# Patient Record
Sex: MALE | Race: WHITE | NOT HISPANIC OR LATINO | Employment: FULL TIME | URBAN - METROPOLITAN AREA
[De-identification: names, ages, dates, MRNs, and addresses within clinical notes are randomized per-mention and may not be internally consistent; named-entity substitution may affect disease eponyms.]

---

## 2017-01-27 ENCOUNTER — OFFICE VISIT (OUTPATIENT)
Dept: FAMILY MEDICINE | Facility: CLINIC | Age: 42
End: 2017-01-27
Payer: COMMERCIAL

## 2017-01-27 VITALS
SYSTOLIC BLOOD PRESSURE: 109 MMHG | BODY MASS INDEX: 32.78 KG/M2 | WEIGHT: 242 LBS | HEIGHT: 72 IN | DIASTOLIC BLOOD PRESSURE: 70 MMHG | TEMPERATURE: 98.1 F | HEART RATE: 72 BPM | RESPIRATION RATE: 14 BRPM

## 2017-01-27 DIAGNOSIS — B07.0 PLANTAR WARTS: Primary | ICD-10-CM

## 2017-01-27 PROCEDURE — 17110 DESTRUCTION B9 LES UP TO 14: CPT | Performed by: PHYSICIAN ASSISTANT

## 2017-01-27 NOTE — PROGRESS NOTES
SUBJECTIVE: 41 year old male complains of warts.   They have been present for 5 year(s).    OBJECTIVE: 5 wart(s) noted on the bilateral feet. Size range is 1/4-3 cm.    ASSESSMENT: Warts (Verruca Vulgaris)    PLAN: The viral etiology and natural history has been discussed.   Various treatment methods, side effects and failure rates have been   discussed.  A choice of liquid nitrogen with Cantharidin was made, and the expected   blistering or scabbing reaction explained.  Liquid nitrogen was   applied to 5 wart(s) with cantharidin;  the patient to go to [facility] prn in 2-4 weeks for laser therapy    Rajan Gleason, PAC

## 2017-01-27 NOTE — NURSING NOTE
Chief Complaint   Patient presents with     Wart     Initial /70 mmHg  Pulse 72  Temp(Src) 98.1  F (36.7  C) (Oral)  Resp 14  Ht 6' (1.829 m)  Wt 242 lb (109.77 kg)  BMI 32.81 kg/m2 Estimated body mass index is 32.81 kg/(m^2) as calculated from the following:    Height as of this encounter: 6' (1.829 m).    Weight as of this encounter: 242 lb (109.77 kg)..  BP completed using cuff size large-RA

## 2017-03-11 DIAGNOSIS — E03.1 CONGENITAL HYPOTHYROIDISM WITHOUT GOITER: ICD-10-CM

## 2017-03-11 NOTE — TELEPHONE ENCOUNTER
Symthroid     Last Written Prescription Date: 03/01/2016  Last Quantity: 90, # refills: 3  Last Office Visit with G, P or [facility] prescribing provider: 01/27/2017-Rajan Gleason        TSH   Date Value Ref Range Status   03/11/2016 2.05 0.40 - 4.00 mU/L Final

## 2017-03-11 NOTE — LETTER
[facility]  [address] [place] [address]  Phone: [phone]    03/13/17    Kodak Crowe  [address] [address]      Dear Kodak:     We recently received a call from your pharmacy requesting a refill of your medication Synthroid.    A review of your chart indicates that an appointment is required with your provider for recheck labs. Please call the clinic at [phone] to schedule your lab appointment.    We have authorized one refill of your medication to allow time for you to schedule your appointment.    Taking care of your health is important to us, and ongoing visits with your provider are vital to your care.  We look forward to seeing you in the near future.          Sincerely,      Buddy Gleason PA-C/Amira MEDEL RN

## 2017-03-13 RX ORDER — LEVOTHYROXINE SODIUM 125 UG/1
125 TABLET ORAL DAILY
Qty: 90 TABLET | Refills: 0 | Status: SHIPPED | OUTPATIENT
Start: 2017-03-13 | End: 2017-06-19

## 2017-03-13 NOTE — TELEPHONE ENCOUNTER
levothyroxine (SYNTHROID, LEVOTHROID) 125 MCG tablet 90 tablet 3 3/12/2016  No   Sig: Take 1 tablet (125 mcg) by mouth daily   Class: E-Prescribe   Notes to Pharmacy: Patient needs brand name Synthroid.     Medication is being filled for 1 time refill only due to:  Future labs ordered TSH.     Amira Gomes RN, BS  Clinical Nurse Triage.

## 2017-06-13 DIAGNOSIS — E03.1 CONGENITAL HYPOTHYROIDISM WITHOUT GOITER: ICD-10-CM

## 2017-06-13 PROCEDURE — 36415 COLL VENOUS BLD VENIPUNCTURE: CPT | Performed by: PHYSICIAN ASSISTANT

## 2017-06-13 PROCEDURE — 84443 ASSAY THYROID STIM HORMONE: CPT | Performed by: PHYSICIAN ASSISTANT

## 2017-06-13 NOTE — LETTER
[facility]  [address] [place], [address]  [phone]      Radha 15, 2017    Kodak Crowe  [address] [address]          Dear Kodak,    The results of your recent tests were normal.  Enclosed is a copy of the results.  It was a pleasure to see you at your last appointment.    If you have any questions or concerns, please call myself or my nurse at [phone].          Sincerely,    Buddy Gleason PA-C    Results for orders placed or performed in visit on 06/13/17   TSH with free T4 reflex   Result Value Ref Range    TSH 1.83 0.40 - 4.00 mU/L     RN

## 2017-06-14 LAB — TSH SERPL DL<=0.005 MIU/L-ACNC: 1.83 MU/L (ref 0.4–4)

## 2017-06-19 DIAGNOSIS — E03.1 CONGENITAL HYPOTHYROIDISM WITHOUT GOITER: ICD-10-CM

## 2017-06-19 RX ORDER — LEVOTHYROXINE SODIUM 125 UG/1
125 TABLET ORAL DAILY
Qty: 15 TABLET | Refills: 0 | Status: SHIPPED | OUTPATIENT
Start: 2017-06-19 | End: 2017-06-20

## 2017-06-19 NOTE — TELEPHONE ENCOUNTER
Patient calling for thyroid refill.  States came for lab but no refill sent.  Discussed refill does not automatically get sent and just call pharmacy when refill needed.  He is out of med.  Letter states to come for provider appt not just lab.  Last well/med visit 3/11/16.  Discussed this and he feels just lab was needed.  Was going to schedule appt but then said needed to check work schedule and call back.  Advised would send #15 and he could call back to schedule.  Patient agrees with plan.  Kamille Corrales RN

## 2017-06-20 RX ORDER — LEVOTHYROXINE SODIUM 125 UG/1
125 TABLET ORAL DAILY
Qty: 90 TABLET | Refills: 3 | Status: SHIPPED | OUTPATIENT
Start: 2017-06-20 | End: 2018-06-15

## 2017-06-20 NOTE — TELEPHONE ENCOUNTER
Patient was only in for plantars wart in January. Since synthroid affects neuro, cardiac, pulm, and many other symptoms of the body, usually a substantial physical exam is needed once yearly. However, since patient was told in March a lab only is all that was needed, will refill today. Recommend annual physical in 6-12 months.     -Rajan Gleason, PAC

## 2017-06-20 NOTE — TELEPHONE ENCOUNTER
We apologized for miscommunication , informed of Rajan's comments, and that refills sent.  Patient pleasantly verbalized understanding and will call back to schedule.  Nigel Black RN

## 2017-10-04 ENCOUNTER — TELEPHONE (OUTPATIENT)
Dept: FAMILY MEDICINE | Facility: CLINIC | Age: 42
End: 2017-10-04

## 2017-10-04 DIAGNOSIS — M25.562 ACUTE PAIN OF LEFT KNEE: Primary | ICD-10-CM

## 2017-10-04 NOTE — TELEPHONE ENCOUNTER
Left message on voicemail.  If questions about this referral, please call our referral dept [phone]  Nigel Black RN

## 2017-10-04 NOTE — TELEPHONE ENCOUNTER
Patient calling and states having (L) knee pain x 2-3 weeks.  No known injury.  Wanting to go to [facility] in [place] and called and was told needs referral.  Wondering if can get referral.  Has appointment tomorrow at 9:30 am with Dr. Lowery.  Please advise.  Call him back at [phone].  Kamille Corrales RN

## 2017-10-05 ENCOUNTER — TRANSFERRED RECORDS (OUTPATIENT)
Dept: HEALTH INFORMATION MANAGEMENT | Facility: CLINIC | Age: 42
End: 2017-10-05

## 2017-10-13 ENCOUNTER — THERAPY VISIT (OUTPATIENT)
Dept: PHYSICAL THERAPY | Facility: CLINIC | Age: 42
End: 2017-10-13
Payer: COMMERCIAL

## 2017-10-13 DIAGNOSIS — M54.16 LUMBAR RADICULOPATHY: Primary | ICD-10-CM

## 2017-10-13 PROCEDURE — 97140 MANUAL THERAPY 1/> REGIONS: CPT | Mod: GP | Performed by: PHYSICAL THERAPIST

## 2017-10-13 PROCEDURE — 97110 THERAPEUTIC EXERCISES: CPT | Mod: GP | Performed by: PHYSICAL THERAPIST

## 2017-10-13 PROCEDURE — 97161 PT EVAL LOW COMPLEX 20 MIN: CPT | Mod: GP | Performed by: PHYSICAL THERAPIST

## 2017-10-13 ASSESSMENT — ACTIVITIES OF DAILY LIVING (ADL)
GIVING WAY, BUCKLING OR SHIFTING OF KNEE: I DO NOT HAVE THE SYMPTOM
STIFFNESS: THE SYMPTOM AFFECTS MY ACTIVITY SLIGHTLY
WALK: ACTIVITY IS NOT DIFFICULT
HOW_WOULD_YOU_RATE_THE_OVERALL_FUNCTION_OF_YOUR_KNEE_DURING_YOUR_USUAL_DAILY_ACTIVITIES?: ABNORMAL
AS_A_RESULT_OF_YOUR_KNEE_INJURY,_HOW_WOULD_YOU_RATE_YOUR_CURRENT_LEVEL_OF_DAILY_ACTIVITY?: ABNORMAL
SWELLING: I DO NOT HAVE THE SYMPTOM
WEAKNESS: THE SYMPTOM AFFECTS MY ACTIVITY MODERATELY
GO DOWN STAIRS: ACTIVITY IS FAIRLY DIFFICULT
STAND: ACTIVITY IS NOT DIFFICULT
SQUAT: ACTIVITY IS FAIRLY DIFFICULT
KNEEL ON THE FRONT OF YOUR KNEE: ACTIVITY IS SOMEWHAT DIFFICULT
PAIN: THE SYMPTOM AFFECTS MY ACTIVITY MODERATELY
RISE FROM A CHAIR: ACTIVITY IS FAIRLY DIFFICULT
HOW_WOULD_YOU_RATE_THE_CURRENT_FUNCTION_OF_YOUR_KNEE_DURING_YOUR_USUAL_DAILY_ACTIVITIES_ON_A_SCALE_FROM_0_TO_100_WITH_100_BEING_YOUR_LEVEL_OF_KNEE_FUNCTION_PRIOR_TO_YOUR_INJURY_AND_0_BEING_THE_INABILITY_TO_PERFORM_ANY_OF_YOUR_USUAL_DAILY_ACTIVITIES?: 75
KNEE_ACTIVITY_OF_DAILY_LIVING_SUM: 45
KNEE_ACTIVITY_OF_DAILY_LIVING_SCORE: 64.29
SIT WITH YOUR KNEE BENT: ACTIVITY IS FAIRLY DIFFICULT
LIMPING: I DO NOT HAVE THE SYMPTOM
GO UP STAIRS: ACTIVITY IS FAIRLY DIFFICULT
RAW_SCORE: 45

## 2017-10-13 NOTE — PROGRESS NOTES
Subjective:    Patient is a 42 year old male presenting with rehab left knee hpi. The history is provided by the patient. No  was used.   Kodak Crowe is a 42 year old male with a left knee condition.  Condition occurred with:  Insidious onset.    This is a new condition  3 weeks ago  .    Patient reports pain:  Anterior, sub patellar and medial.    Pain is described as aching and is intermittent and reported as 5/10.  Associated symptoms:  Loss of motion/stiffness and loss of strength.   Symptoms are exacerbated by descending stairs, ascending stairs, bending/squatting, kneeling, sitting and transfers and relieved by rest.  Since onset symptoms are gradually improving.  Special tests:  X-ray.      General health as reported by patient is good.  Pertinent medical history includes:  None.  Medical allergies: no.  Other surgeries include:  None reported.  Current medications:  None as reported by the patient.  Current occupation is [employer].  Patient is working in normal job without restrictions.  Primary job tasks include:  Lifting, repetitive tasks and other.    Barriers include:  Stairs and transportation.    Red flags:  None as reported by the patient.                        Objective:    System                                           Hip Evaluation  Hip PROM:                    Pain: with end range of passive knee flexion        Hip Strength:  Hip Strength:    Left:    Normal  Right:  Normal                                   Knee Evaluation:  ROM:  AROM: normal  PROM: normal (produce concordant pain with end range flexion)  Strength wnl knee: produce concordant pain with left sitting quad MMT.          Strength:     Extension:  Left:/5  Strong/painful  Pain:      Right:/5  Strong/pain free  Pain:  Flexion:  Left:/5  Strong/pain free  Pain:      Right:/5  Strong/pain free  Pain:    Quad Set Left: Good    Pain:   Quad Set Right: Good    Pain:            Functional Testing:           Quad:    Single Leg Squat:  Left:    Produce concordant pain with left single leg squat   Right:        Bilateral Leg Squat:                  Art Lumbar Evaluation    Posture:  Sitting: fair  Standing: fair  Lordosis: WNL  Lateral Shift: no  Correction of Posture: no effect    Movement Loss:    Extension (EXT): mod      Test Movements:        EIL: During: decreases  After: better  Mechanical Response: no effect  Repeat EIL: During: decreases  After: better  Mechanical Response: no effect        Conclusion: other (provisional classification: lumbar derangement, unilateral asymmetrical symptoms to the knee, possible secondary knee diagnosis)                                         ROS    Assessment/Plan:      Patient is a 42 year old male with lumbar complaints.    Patient has the following significant findings with corresponding treatment plan.                Diagnosis 1:  Lumbar derangement  Pain -  manual therapy, education, directional preference exercise and home program  Decreased function - therapeutic activities and home program     Therapy Evaluation Codes:   1) History comprised of:   Personal factors that impact the plan of care:      Past/current experiences.    Comorbidity factors that impact the plan of care are:      None.     Medications impacting care: None.  2) Examination of Body Systems comprised of:   Body structures and functions that impact the plan of care:      Knee and Lumbar spine.   Activity limitations that impact the plan of care are:      Bending, Driving, Jumping, Running, Sitting, Sports, Squatting/kneeling and Stairs.  3) Clinical presentation characteristics are:   Stable/Uncomplicated.  4) Decision-Making    Low complexity using standardized patient assessment instrument and/or measureable assessment of functional outcome.  Cumulative Therapy Evaluation is: Low complexity.    Previous and current functional limitations:  (See Goal Flow Sheet for this information)     Short term and Long term goals: (See Goal Flow Sheet for this information)     Communication ability:  Patient appears to be able to clearly communicate and understand verbal and written communication and follow directions correctly.  Treatment Explanation - The following has been discussed with the patient:   RX ordered/plan of care  Anticipated outcomes  Possible risks and side effects  This patient would benefit from PT intervention to resume normal activities.   Rehab potential is good.    Frequency:  1 X week, once daily  Duration:  for 4-6 weeks  Discharge Plan:  Achieve all LTG.  Independent in home treatment program.  Reach maximal therapeutic benefit.    Please refer to the daily flowsheet for treatment today, total treatment time and time spent performing 1:1 timed codes.

## 2017-10-13 NOTE — MR AVS SNAPSHOT
"              After Visit Summary   10/13/2017    Kodak Crowe    MRN: [id number]           Patient Information     Date Of Birth          1975        Visit Information        Provider Department      10/13/2017 12:20 PM [facility] PT [facility]        Today's Diagnoses     Lumbar radiculopathy    -  1       Follow-ups after your visit        Your next 10 appointments already scheduled     Oct 27, 2017  7:00 AM CDT   [facility] Ext[facility] with [facility] PT   [facility] ([facility])    [address]  [place] [address] [phone]              Who to contact     If you have questions or need follow up information about today's clinic visit or your schedule please contact [facility] directly at [phone].  Normal or non-critical lab and imaging results will be communicated to you by MyChart, letter or phone within 4 business days after the clinic has received the results. If you do not hear from us within 7 days, please contact the clinic through [employer] or phone. If you have a critical or abnormal lab result, we will notify you by phone as soon as possible.  Submit refill requests through [employer] or call your pharmacy and they will forward the refill request to us. Please allow 3 business days for your refill to be completed.          Additional Information About Your Visit        MyChart Information     [employer] lets you send messages to your doctor, view your test results, renew your prescriptions, schedule appointments and more. To sign up, go to [email or web address] . Click on \"Log in\" on the left side of the screen, which will take you to the Welcome page. Then click on \"Sign up Now\" on the right side of the page.     You will be asked to enter the access code listed below, as well as some personal information. Please follow the directions to create your username and password.     Your " access code is: [email or web address]  Expires: 2018  1:44 PM     Your access code will  in 90 days. If you need help or a new code, please call your [facility] clinic or [phone].        Care EveryWhere ID     This is your Care EveryWhere ID. This could be used by other organizations to access your [facility] medical records  [id number]         Blood Pressure from Last 3 Encounters:   17 109/70   16 127/77   16 120/73    Weight from Last 3 Encounters:   17 109.8 kg (242 lb)   16 113.9 kg (251 lb)   16 108 kg (238 lb)              We Performed the Following     HC PT EVAL, LOW COMPLEXITY     MANUAL THER TECH,1+REGIONS,EA 15 MIN     THERAPEUTIC EXERCISES        Primary Care Provider Office Phone # Fax #    Buddy Lior Gleason PA-C [phone]       [address] [facility] [address]        Equal Access to Services     VITOR JIMÉNEZ : Hadii aad ku hadasho Soomaali, waaxda luqadaha, qaybta kaalmada adeegyada, waxay ligia rodriguez . So [facility] [phone].    ATENCIÓN: Si habla español, tiene a love disposición servicios gratuitos de asistencia lingüística. Llame al [phone].    We comply with applicable federal civil rights laws and [place] laws. We do not discriminate on the basis of race, color, national origin, age, disability, sex, sexual orientation, or gender identity.            Thank you!     Thank you for choosing [facility]  for your care. Our goal is always to provide you with excellent care. Hearing back from our patients is one way we can continue to improve our services. Please take a few minutes to complete the written survey that you may receive in the mail after your visit with us. Thank you!             Your Updated Medication List - Protect others around you: Learn how to safely use, store and throw away your medicines at www.disposemymeds.org.          This list is accurate as of:  10/13/17  1:44 PM.  Always use your most recent med list.                   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

## 2017-10-27 ENCOUNTER — THERAPY VISIT (OUTPATIENT)
Dept: PHYSICAL THERAPY | Facility: CLINIC | Age: 42
End: 2017-10-27
Payer: COMMERCIAL

## 2017-10-27 DIAGNOSIS — M54.16 LUMBAR RADICULOPATHY: ICD-10-CM

## 2017-10-27 DIAGNOSIS — M25.562 CHRONIC PAIN OF LEFT KNEE: Primary | ICD-10-CM

## 2017-10-27 DIAGNOSIS — G89.29 CHRONIC PAIN OF LEFT KNEE: Primary | ICD-10-CM

## 2017-10-27 PROCEDURE — 97140 MANUAL THERAPY 1/> REGIONS: CPT | Mod: GP | Performed by: PHYSICAL THERAPIST

## 2017-10-27 PROCEDURE — 97110 THERAPEUTIC EXERCISES: CPT | Mod: GP | Performed by: PHYSICAL THERAPIST

## 2017-10-27 PROCEDURE — 97530 THERAPEUTIC ACTIVITIES: CPT | Mod: GP | Performed by: PHYSICAL THERAPIST

## 2017-11-02 ENCOUNTER — THERAPY VISIT (OUTPATIENT)
Dept: PHYSICAL THERAPY | Facility: CLINIC | Age: 42
End: 2017-11-02
Payer: COMMERCIAL

## 2017-11-02 DIAGNOSIS — M54.16 LUMBAR RADICULOPATHY: ICD-10-CM

## 2017-11-02 PROCEDURE — 97110 THERAPEUTIC EXERCISES: CPT | Mod: GP | Performed by: PHYSICAL THERAPIST

## 2017-11-02 PROCEDURE — 97140 MANUAL THERAPY 1/> REGIONS: CPT | Mod: GP | Performed by: PHYSICAL THERAPIST

## 2017-11-10 ENCOUNTER — THERAPY VISIT (OUTPATIENT)
Dept: PHYSICAL THERAPY | Facility: CLINIC | Age: 42
End: 2017-11-10
Payer: COMMERCIAL

## 2017-11-10 DIAGNOSIS — M54.16 LUMBAR RADICULOPATHY: ICD-10-CM

## 2017-11-10 PROCEDURE — 97110 THERAPEUTIC EXERCISES: CPT | Mod: GP | Performed by: PHYSICAL THERAPIST

## 2017-11-10 PROCEDURE — 97530 THERAPEUTIC ACTIVITIES: CPT | Mod: GP | Performed by: PHYSICAL THERAPIST

## 2017-11-10 PROCEDURE — 97140 MANUAL THERAPY 1/> REGIONS: CPT | Mod: GP | Performed by: PHYSICAL THERAPIST

## 2017-11-21 ENCOUNTER — THERAPY VISIT (OUTPATIENT)
Dept: CHIROPRACTIC MEDICINE | Facility: CLINIC | Age: 42
End: 2017-11-21
Payer: COMMERCIAL

## 2017-11-21 DIAGNOSIS — M99.05 SOMATIC DYSFUNCTION OF PELVIS REGION: ICD-10-CM

## 2017-11-21 DIAGNOSIS — M54.41 ACUTE RIGHT-SIDED LOW BACK PAIN WITH RIGHT-SIDED SCIATICA: Primary | ICD-10-CM

## 2017-11-21 DIAGNOSIS — M99.02 THORACIC SEGMENT DYSFUNCTION: ICD-10-CM

## 2017-11-21 DIAGNOSIS — M99.03 SOMATIC DYSFUNCTION OF LUMBAR REGION: ICD-10-CM

## 2017-11-21 PROCEDURE — 99203 OFFICE O/P NEW LOW 30 MIN: CPT | Mod: 25 | Performed by: CHIROPRACTOR

## 2017-11-21 PROCEDURE — 98941 CHIROPRACT MANJ 3-4 REGIONS: CPT | Mod: AT | Performed by: CHIROPRACTOR

## 2017-11-21 PROCEDURE — 97035 APP MDLTY 1+ULTRASOUND EA 15: CPT | Performed by: CHIROPRACTOR

## 2017-11-21 NOTE — PROGRESS NOTES
Initial Chiropractic Clinic Visit    PCP: Buddy Gleason    Kodak Crowe is a 42 year old male who is seen  in consultation at the request of  Dale Lowery M.D. presenting with right lower back and radicular leg pain, burning right lateral calf, numbness right foot. Patient reports that the onset was 1-2 months ago. Patient reports doing exercises to strengthen his core and developed left knee pain. Patient reports he was seen by an orthopedic specialist and was referred to physical therapy for left knee pain. States while doing physical therapy the back pain increased and started having pain radiate all the way down the right leg to the right foot. States noticing some weakness in the right foot.  When asked, patient denies:, falling, slipping, bending and reaching or sleeping awkwardly. Patient states he does have a past history of degenerative disc disease and bulging discs in the lumbar spine. Patient reports the last episode of back pain was 5 years ago.  Prior to onset, the patient was able to sleep 7 hour per night, able to sit one hour. Patient notes that due to symptoms, they can only sit 10 minutes and sleeps on 1-2 hours before waking due to pain. Kodak Crowe notes   sleeping rated at a 6/10 difficulty  and prior to this incident it was 0/10.        Injury: No injury or trauma    Location of Pain: bilateral lower lumbar spine-pain radiates right glute, lateral thigh/calf, burning right lateral calf, numbness foot at the following level(s) L4 , L5  and Sacrum . 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    Past Medical History:   Diagnosis Date     Tourette's disorder 1986     Past Surgical History:   Procedure Laterality Date     ENT SURGERY       VASECTOMY       Objective  There were no vitals taken for this visit.      GENERAL APPEARANCE: healthy, alert and no distress   GAIT: NORMAL  SKIN: no suspicious lesions or rashes  NEURO: Normal strength and tone, mentation intact and speech normal  PSYCH:  mentation appears normal and affect normal/bright    Low back exam:    Inspection:  \"     no visible deformity in the low back       normal skin\",    ROM:       full extension       limited flexion due to pain    Tender:       paraspinal muscles    Non Tender:       remainder of lumbar spine    Strength:       ankle dorsiflexion 5/5       ankle plantarflexion 5/5       dorsiflexion of the great toe 4/5-right, 5/5 left       Patient feels weakness on toe and heal walking    Reflexes:       patellar (L3, L4) symmetric normal       achilles tendons (S1) asymmetric diminished-right    Sensation:      grossly intact throughout lower extremities    Special tests:  Kemps - Right positive, produced back pain and Left negative, SLR - Right positive, produced back pain and Left negative and Slump - Right positive, produced leg and back pain and Left negative    Segmental spinal dysfunction/restrictions found at::  L5 Right rotation restricted  Sacrum " Right rotation restricted.    The following soft tissue hypotonicities were observed:Quad lumb: right, referred pain: no    Trigger points were found in:Lumbar erector spine    Muscle spasm found in:Lumbar erector spine and Quad lumb      Radiology:  none    Assessment:    No diagnosis found.    RX ordered/plan of care  Anticipated outcomes  Possible risks and side effects    After discussing the risk and benefits of care, patient consented to treatment    Prognosis: Guarded-due to possible disc injury      Patient's condition:  Patient had restrictions pre-manipulation    Treatment effectiveness:  Post manipulation there is better intersegmental movement and Patient claims to feel looser post manipulation      Plan:    Procedures:  Evaluation and Management:  28842 Moderate level exam 30 min    CMT:  69425 Chiropractic manipulative treatment 3-4 regions performed   Thoracic: gentle PtoA, T10, T11, Prone  Lumbar: Diversified, L4, L5, Side posture-right side up only-Gentle  Pelvis: Drop Table, PSIS Right , Prone    Modalities:  28357: US:  2.0 Betts/cm squared for 8 minutes at 1mhz  cont pulsed, Location:right L/S spine    Therapeutic procedures:  None    Response to Treatment  Reduction in symptoms as reported by patient-patient reports the right leg was left painful and easier to walk post treatment      Treatment plan and goals:  Goals:  SLEEPING: the patient specific goal is to attain his pre-injury status of 7 hours comfortably  SITTING: the patient specific goal is to attain pre-injury status of  2 hours comfortably    Frequency of care  Duration of care is estimated to be 6 weeks, from the initial treatment.  It is estimated that the patient will need a total of 4-6 visits to resolve this episode.  For the initial therapeutic trial of care, the frequency is recommended at 1 X week, once daily.  A reevaluation would be clinically appropriate in 6 visits, to determine progress and further course of  care.    In-Office Treatment  Evaluation  46051 Chiropractic manipulative treatment 3-4 regions performed   Thoracic: gentle PtoA, T10, T11, Prone  Lumbar: Diversified, L4, L5, Side posture-right side up only-Gentle  Pelvis: Drop Table, PSIS Right , Prone    Modalities:  57265: US:  2.0 Betts/cm squared for 8 minutes at 1mhz  cont pulsed, Location:right L/S spine       Recommendations:    Instructions:ice 20 minutes every other hour as needed    Follow-up:  Return to care in one week. We discussed at length if his symptoms worsen or he develops foot drop to go to ER. I did recommend a referral to [facility] if symptoms do not improve soon.      Discussed the assessment with the patient.      Disclaimer: This note consists of symbols derived from keyboarding, dictation and/or voice recognition software. As a result, there may be errors in the script that have gone undetected. Please consider this when interpreting information found in this chart.

## 2017-11-30 ENCOUNTER — THERAPY VISIT (OUTPATIENT)
Dept: CHIROPRACTIC MEDICINE | Facility: CLINIC | Age: 42
End: 2017-11-30
Payer: COMMERCIAL

## 2017-11-30 DIAGNOSIS — M54.41 ACUTE RIGHT-SIDED LOW BACK PAIN WITH RIGHT-SIDED SCIATICA: Primary | ICD-10-CM

## 2017-11-30 DIAGNOSIS — M99.03 SOMATIC DYSFUNCTION OF LUMBAR REGION: ICD-10-CM

## 2017-11-30 DIAGNOSIS — M99.05 SOMATIC DYSFUNCTION OF PELVIS REGION: ICD-10-CM

## 2017-11-30 DIAGNOSIS — M99.02 THORACIC SEGMENT DYSFUNCTION: ICD-10-CM

## 2017-11-30 PROCEDURE — 98941 CHIROPRACT MANJ 3-4 REGIONS: CPT | Mod: AT | Performed by: CHIROPRACTOR

## 2017-11-30 NOTE — PROGRESS NOTES
Visit #:  2 of 4 based on treatment plan 4 visits    Subjective:  Kodak Crowe is a 42 year old male who is seen in f/u up for: presenting with right lower back and radicular leg pain, burning right lateral calf, numbness right foot. Patient reports that the onset was 1-2 months ago. Patient reports doing exercises to strengthen his core and developed left knee pain.     Data Unavailable.     Since last visit on 11/21/2017,  Kodak Crowe reports the following changes: Pain immediately after last treatment: 6/10 and their pain level today 6/10. Sitting rated at a 6/10 painful, difficult and prior to initial visit 6/10 and prior to this incident it was 0/10. Overall no change. Patient reports continued right leg pain, numbness in right foot/calf, and weakness on heal walking.    Area of chief complaint:  Lumbar :  Symptoms are graded at 6/10. The quality is described as stiff, achey, dull, numbness-right foot.  Motion has remained about the same, no improvement. Patient feels that they have not improved and feel the same.     Since last visit the patient feels that they are 0 percent  improved from last visit.       Objective:  The following was observed:    P: pain elicited on palpation, L5,     A: static palpation demonstrates intersegmental asymmetry T10, L4, L5, SIJ, PSIS right    R: motion palpation notes restricted motion    T: muscle spasm at level(s): Lumbar erector spine, Piriformis and T-spine paraspinal R>>L      Assessment:    Segmental spinal dysfunction/restrictions found at:  T10  T11  L5  Sacrum  PSIS Right    Diagnoses:    No diagnosis found.    Patient's condition:  Patient had restrictions pre-manipulation    Treatment effectiveness:  Post manipulation there is better intersegmental movement and Patient claims to feel looser post manipulation      Procedures:  CMT:  06027 Chiropractic manipulative treatment 3-4 regions performed   Thoracic: gentle PtoA, T10, T11, Prone  Lumbar: Diversified, L4, L5,  Side posture-right side up only-Gentle  Pelvis: Drop Table, PSIS Right , Prone    Modalities:  09468: US:  2.0 Betts/cm squared for 8 minutes at 1mhz  cont pulsed, Location:right L/S spine    Therapeutic procedures:  None      Prognosis: Guarded-Possible disc injury    Progress towards Goals: Patient has not made progress towards goal of SLEEPING: the patient specific goal is to attain his pre-injury status of 7 hours comfortably  SITTING: the patient specific goal is to attain pre-injury status of  2 hours comfortably.     Response to Treatment:   Patient tolerated the treatment today.      Recommendations:    Instructions:ice 20 minutes every other hour as needed    Follow-up:  Patient will be referred to [facility] for further recommendations and MRI due to L5-neurological deficits-both sensory-(loss of sensation in lateral calf and foot which remains constant) and motor deficits (difficulty with heal walking and feeling weak right foot). Patient will check insurance, may need referral from PCP.

## 2017-12-06 ENCOUNTER — OFFICE VISIT (OUTPATIENT)
Dept: NEUROSURGERY | Facility: CLINIC | Age: 42
End: 2017-12-06
Attending: CHIROPRACTOR
Payer: COMMERCIAL

## 2017-12-06 VITALS
OXYGEN SATURATION: 97 % | DIASTOLIC BLOOD PRESSURE: 90 MMHG | HEIGHT: 72 IN | BODY MASS INDEX: 34.54 KG/M2 | HEART RATE: 67 BPM | WEIGHT: 255 LBS | SYSTOLIC BLOOD PRESSURE: 142 MMHG

## 2017-12-06 DIAGNOSIS — M54.16 LUMBAR RADICULAR PAIN: Primary | ICD-10-CM

## 2017-12-06 PROCEDURE — 99204 OFFICE O/P NEW MOD 45 MIN: CPT | Performed by: NURSE PRACTITIONER

## 2017-12-06 PROCEDURE — 99211 OFF/OP EST MAY X REQ PHY/QHP: CPT | Performed by: NURSE PRACTITIONER

## 2017-12-06 ASSESSMENT — PAIN SCALES - GENERAL: PAINLEVEL: MILD PAIN (2)

## 2017-12-06 NOTE — NURSING NOTE
Chief Complaint   Patient presents with     Neurologic Problem     right side low back pain, radiates down right leg, tingling knee down       Initial /90  Pulse 67  Ht 6' (1.829 m)  Wt 255 lb (115.7 kg)  SpO2 97%  BMI 34.58 kg/m2 Estimated body mass index is 34.58 kg/(m^2) as calculated from the following:    Height as of this encounter: 6' (1.829 m).    Weight as of this encounter: 255 lb (115.7 kg).      Patient prefers to be contacted via at Home.   Okay to leave detailed message: No  Patient uses MyChart: Jen MILLER LPN

## 2017-12-06 NOTE — MR AVS SNAPSHOT
After Visit Summary   12/6/2017    Kodak Crowe    MRN: [id number]           Patient Information     Date Of Birth          1975        Visit Information        Provider Department      12/6/2017 2:40 PM Madelyn Hoffmann APRN CNP [facility]        Today's Diagnoses     Lumbar radicular pain    -  1      Care Instructions    1.  Please call [facility] to have lumbar MRI completed. Call [phone].   I will contact you with results.           Follow-ups after your visit        Your next 10 appointments already scheduled     Dec 06, 2017  2:40 PM CST   New Visit with EUSEBIO Pradhan CNP   [facility] ([facility])    [address] [address] [address] [phone]              Future tests that were ordered for you today     Open Future Orders        Priority Expected Expires Ordered    MR Lumbar Spine w/o Contrast Routine  12/6/2018 12/6/2017            Who to contact     If you have questions or need follow up information about today's clinic visit or your schedule please contact [facility] directly at [phone].  Normal or non-critical lab and imaging results will be communicated to you by MyChart, letter or phone within 4 business days after the clinic has received the results. If you do not hear from us within 7 days, please contact the clinic through [employer] or phone. If you have a critical or abnormal lab result, we will notify you by phone as soon as possible.  Submit refill requests through [employer] or call your pharmacy and they will forward the refill request to us. Please allow 3 business days for your refill to be completed.          Additional Information About Your Visit        MyChart Information     [employer] lets you send messages to your doctor, view your test results, renew your prescriptions, schedule appointments and more. To sign up, go to  [email or web address] . Click on \"Log in\" on the left side of the screen, which will take you to the Welcome page. Then click on \"Sign up Now\" on the right side of the page.     You will be asked to enter the access code listed below, as well as some personal information. Please follow the directions to create your username and password.     Your access code is: [email or web address]  Expires: 2018 12:44 PM     Your access code will  in 90 days. If you need help or a new code, please call your [facility] clinic or [phone].        Care EveryWhere ID     This is your Care EveryWhere ID. This could be used by other organizations to access your [facility] medical records  [id number]        Your Vitals Were     Pulse Height Pulse Oximetry BMI (Body Mass Index)          67 6' (1.829 m) 97% 34.58 kg/m2         Blood Pressure from Last 3 Encounters:   17 142/90   17 109/70   16 127/77    Weight from Last 3 Encounters:   17 255 lb (115.7 kg)   17 242 lb (109.8 kg)   16 251 lb (113.9 kg)               Primary Care Provider Office Phone # Fax #    Buddy Lior Gleason PA-C [phone] [address] [address] [address]        Equal Access to Services     VITOR JIMÉNEZ AH: Hadii chandni yaneso Sobalbina, waaxda luqadaha, qaybta kaalmada adeegyada, kiara ulloa. So [facility] [phone].    ATENCIÓN: Si habla español, tiene a love disposición servicios gratuitos de asistencia lingüística. Isa al [phone].    We comply with applicable federal civil rights laws and [place] laws. We do not discriminate on the basis of race, color, national origin, age, disability, sex, sexual orientation, or gender identity.            Thank you!     Thank you for choosing [facility]  for your care. Our goal is always to provide you with excellent care. Hearing back from our patients is one way we can continue to improve our services. " Please take a few minutes to complete the written survey that you may receive in the mail after your visit with us. Thank you!             Your Updated Medication List - Protect others around you: Learn how to safely use, store and throw away your medicines at www.disposemymeds.org.          This list is accurate as of: 12/6/17  1:58 PM.  Always use your most recent med list.                   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

## 2017-12-06 NOTE — LETTER
12/6/2017         RE: Kodak Crowe  [address] [address]  [place] [address]        Dear Colleague,    Thank you for referring your patient, Kodak Crowe, to the [facility]. Please see a copy of my visit note below.    Dr. Suhail Segura  [facility]  Neurosurgery Clinic Visit        CC: lumbar radicular pain     Primary care Provider: Buddy Gleason      Reason For Visit:   I was asked by Dr. Gleason and Dr. Carmella Mccall to consult on the patient for lumbar radicular pain.      HPI: Kodak Crowe is a 42 year old male with lumbar radicular pain on the right. He notes he has had radicular pain and numbness for about 2 months. He was seeing Dr. Mccall who noticed increased pain and paresthesias and referred to see us.  He also has been seen at [facility] chiropractic and PT.  He has not had injections. He has noted weight gain due to pain and decreased activity. He states that he was very active and has had back issues in the past.  He denies any foot drop or falls.        Pain at its worst 10  Pain right now:  4    Past Medical History:   Diagnosis Date     Tourette's disorder 1986       Past Medical History reviewed with patient during visit.    Past Surgical History:   Procedure Laterality Date     ENT SURGERY       VASECTOMY       Past Surgical History reviewed with patient during visit.    Current Outpatient Prescriptions   Medication     levothyroxine (SYNTHROID) 125 MCG tablet     No current facility-administered medications for this visit.        Allergies   Allergen Reactions     No Known Drug Allergies        Social History     Social History     Marital status:      Spouse name: N/A     Number of children: N/A     Years of education: N/A     Social History Main Topics     Smoking status: Never Smoker     Smokeless tobacco: Never Used     Alcohol use 0.0 oz/week     0 Standard drinks or equivalent per week      Comment: occasional     Drug use: No      Sexual activity: Yes     Partners: Female     Other Topics Concern     Not on file     Social History Narrative       Family History   Problem Relation Age of Onset     DIABETES Paternal Grandfather      Obesity Mother      Hypertension Mother      Lipids Brother      Blood Disease Father      hematomacrosis     Eye Disorder Mother      [place]'s disease     Blood Disease Brother      hematomacrosis         Review Of Systems  Skin: negative  Eyes: negative  Ears/Nose/Throat: negative  Respiratory: No shortness of breath, dyspnea on exertion, cough, or hemoptysis  Cardiovascular: negative  Gastrointestinal: negative  Genitourinary: negative  Musculoskeletal: back pain  Neurologic: right leg parasthesias  Psychiatric: negative  Hematologic/Lymphatic/Immunologic: negative  Endocrine: thyroid disorder     ROS: 10 point ROS neg other than the symptoms noted above in the HPI.    Vital Signs: /90  Pulse 67  Ht 6' (1.829 m)  Wt 255 lb (115.7 kg)  SpO2 97%  BMI 34.58 kg/m2    Examination:  Constitutional:  Alert, well nourished, NAD.  Memory: recent and remote memory intact  HEENT: Normocephalic, atraumatic.   Pulm:  Without shortness of breath   CV:  No pitting edema of BLE.    Neurological:  Awake  Alert  Oriented x 3  Speech clear  Cranial nerves II - XII intact  PERRL  EOMI  Face symmetric  Tongue midline  Motor exam   Shoulder Abduction:  Right:  5/5   Left:  5/5  Biceps:                      Right:  5/5   Left:  5/5  Triceps:                     Right:  5/5   Left:  5/5  Wrist Extensors:       Right:  5/5   Left:  5/5  Wrist Flexors:           Right:  5/5   Left:  5/5  Intrinsics:                   Right:  5/5   Left:  5/5   Hip Flexor:                Right: 5/5  Left:  5/5  Hip Adductor:             Right:  5/5  Left:  5/5  Hip Abductor:             Right:  5/5  Left:  5/5  Gastroc Soleus:        Right:  5/5  Left:  5/5  Tib/Ant:                      Right:  5/5  Left:  5/5  EHL:                           Right:  5/5  Left:  5/5   Sensation normal to bilateral upper and lower extremities  Muscle tone to bilateral upper and lower extremities normal  Gait: Able to stand from a seated position. Normal non-antalgic, non-myelopathic gait.  Difficulty with walking on the right. Dorsi flexion 5/5 on the right.    Lumbar examination reveals no tenderness of the spine or paraspinous muscles.  Pain lumbar extension.  Hip height is symmetrical. Negative SI joint, sciatic notch or greater trochanteric tenderness to palpation bilaterally.  Straight leg raise is negative bilaterally.      Imaging: NONE      Assessment/Plan:   Kodak Crowe is a 42 year old male with lumbar radicular pain on the right. He notes he has had radicular pain and numbness for about 2 months. He was seeing Dr. Mccall who noticed increased pain and paresthesias and referred to see us.  He also has been seen at [facility] chiropractic and PT.  He has not had injections. He has noted weight gain due to pain and decreased activity. He states that he was very active and has had back issues in the past.  He denies any foot drop or falls.  He did notice an episode when this started when he could not flex his right foot. He does have full strength on that side but has difficulty with heel walking on the right.  He notes severe burning to his right calf into the toes as well. He is neurologically intact at this time.  We will have him undergo a lumbar MRI.  He is open to this.      Patient Instructions   1.  Please call [facility] to have lumbar MRI completed. Call [phone].   I will contact you with results.      Madelyn Hoffmann CNP  [facility]  [address] [address]    Tel [phone]  Pager [phone]      Again, thank you for allowing me to participate in the care of your patient.        Sincerely,        EUSEBIO Pradhan CNP

## 2017-12-06 NOTE — PROGRESS NOTES
Dr. Suhail Segura  [facility]  Neurosurgery Clinic Visit        CC: lumbar radicular pain     Primary care Provider: Buddy Gleason      Reason For Visit:   I was asked by Dr. Gleason and Dr. Carmella Mccall to consult on the patient for lumbar radicular pain.      HPI: Kodak Crowe is a 42 year old male with lumbar radicular pain on the right. He notes he has had radicular pain and numbness for about 2 months. He was seeing Dr. Mccall who noticed increased pain and paresthesias and referred to see us.  He also has been seen at [facility] chiropractic and PT.  He has not had injections. He has noted weight gain due to pain and decreased activity. He states that he was very active and has had back issues in the past.  He denies any foot drop or falls.        Pain at its worst 10  Pain right now:  4    Past Medical History:   Diagnosis Date     Tourette's disorder 1986       Past Medical History reviewed with patient during visit.    Past Surgical History:   Procedure Laterality Date     ENT SURGERY       VASECTOMY       Past Surgical History reviewed with patient during visit.    Current Outpatient Prescriptions   Medication     levothyroxine (SYNTHROID) 125 MCG tablet     No current facility-administered medications for this visit.        Allergies   Allergen Reactions     No Known Drug Allergies        Social History     Social History     Marital status:      Spouse name: N/A     Number of children: N/A     Years of education: N/A     Social History Main Topics     Smoking status: Never Smoker     Smokeless tobacco: Never Used     Alcohol use 0.0 oz/week     0 Standard drinks or equivalent per week      Comment: occasional     Drug use: No     Sexual activity: Yes     Partners: Female     Other Topics Concern     Not on file     Social History Narrative       Family History   Problem Relation Age of Onset     DIABETES Paternal Grandfather      Obesity Mother      Hypertension Mother       Lipids Brother      Blood Disease Father      hematomacrosis     Eye Disorder Mother      [place]'s disease     Blood Disease Brother      hematomacrosis         Review Of Systems  Skin: negative  Eyes: negative  Ears/Nose/Throat: negative  Respiratory: No shortness of breath, dyspnea on exertion, cough, or hemoptysis  Cardiovascular: negative  Gastrointestinal: negative  Genitourinary: negative  Musculoskeletal: back pain  Neurologic: right leg parasthesias  Psychiatric: negative  Hematologic/Lymphatic/Immunologic: negative  Endocrine: thyroid disorder     ROS: 10 point ROS neg other than the symptoms noted above in the HPI.    Vital Signs: /90  Pulse 67  Ht 6' (1.829 m)  Wt 255 lb (115.7 kg)  SpO2 97%  BMI 34.58 kg/m2    Examination:  Constitutional:  Alert, well nourished, NAD.  Memory: recent and remote memory intact  HEENT: Normocephalic, atraumatic.   Pulm:  Without shortness of breath   CV:  No pitting edema of BLE.    Neurological:  Awake  Alert  Oriented x 3  Speech clear  Cranial nerves II - XII intact  PERRL  EOMI  Face symmetric  Tongue midline  Motor exam   Shoulder Abduction:  Right:  5/5   Left:  5/5  Biceps:                      Right:  5/5   Left:  5/5  Triceps:                     Right:  5/5   Left:  5/5  Wrist Extensors:       Right:  5/5   Left:  5/5  Wrist Flexors:           Right:  5/5   Left:  5/5  Intrinsics:                   Right:  5/5   Left:  5/5   Hip Flexor:                Right: 5/5  Left:  5/5  Hip Adductor:             Right:  5/5  Left:  5/5  Hip Abductor:             Right:  5/5  Left:  5/5  Gastroc Soleus:        Right:  5/5  Left:  5/5  Tib/Ant:                      Right:  5/5  Left:  5/5  EHL:                          Right:  5/5  Left:  5/5   Sensation normal to bilateral upper and lower extremities  Muscle tone to bilateral upper and lower extremities normal  Gait: Able to stand from a seated position. Normal non-antalgic, non-myelopathic gait.  Difficulty  with walking on the right. Dorsi flexion 5/5 on the right.    Lumbar examination reveals no tenderness of the spine or paraspinous muscles.  Pain lumbar extension.  Hip height is symmetrical. Negative SI joint, sciatic notch or greater trochanteric tenderness to palpation bilaterally.  Straight leg raise is negative bilaterally.      Imaging: NONE      Assessment/Plan:   Kodak Crowe is a 42 year old male with lumbar radicular pain on the right. He notes he has had radicular pain and numbness for about 2 months. He was seeing Dr. Mccall who noticed increased pain and paresthesias and referred to see us.  He also has been seen at [facility] chiropractic and PT.  He has not had injections. He has noted weight gain due to pain and decreased activity. He states that he was very active and has had back issues in the past.  He denies any foot drop or falls.  He did notice an episode when this started when he could not flex his right foot. He does have full strength on that side but has difficulty with heel walking on the right.  He notes severe burning to his right calf into the toes as well. He is neurologically intact at this time.  We will have him undergo a lumbar MRI.  He is open to this.      Patient Instructions   1.  Please call [facility] to have lumbar MRI completed. Call [phone].   I will contact you with results.      Madelyn Hoffmann [facility]  [facility]  [address] [address]    Tel [phone]  Pager [phone]

## 2017-12-06 NOTE — PATIENT INSTRUCTIONS
1.  Please call [facility] to have lumbar MRI completed. Call [phone].   I will contact you with results.

## 2017-12-11 ENCOUNTER — TELEPHONE (OUTPATIENT)
Dept: NEUROSURGERY | Facility: CLINIC | Age: 42
End: 2017-12-11

## 2017-12-11 ENCOUNTER — TELEPHONE (OUTPATIENT)
Dept: PALLIATIVE MEDICINE | Facility: CLINIC | Age: 42
End: 2017-12-11

## 2017-12-11 ENCOUNTER — HOSPITAL ENCOUNTER (OUTPATIENT)
Dept: MRI IMAGING | Facility: CLINIC | Age: 42
Discharge: HOME | End: 2017-12-11
Attending: NURSE PRACTITIONER | Admitting: NURSE PRACTITIONER
Payer: COMMERCIAL

## 2017-12-11 DIAGNOSIS — M54.16 LUMBAR RADICULAR PAIN: ICD-10-CM

## 2017-12-11 DIAGNOSIS — M54.16 LUMBAR RADICULAR PAIN: Primary | ICD-10-CM

## 2017-12-11 PROCEDURE — 72148 MRI LUMBAR SPINE W/O DYE: CPT

## 2017-12-11 NOTE — TELEPHONE ENCOUNTER
Pre-screening Questions for Radiology Injections:    Injection to be done at which interventional clinic site? [facility]    Procedure ordered by Madelyn Hoffmann    Procedure ordered? Lumbar Epidural Steroid Injection    What insurance would patient like us to bill for this procedure? [employer] and [employer]      Worker's comp or MVA (motor vehicle accident) -Any injection DO NOT SCHEDULE and route to Taylor Villalba.      [employer] insurance - For SI joint injections, DO NOT SCHEDULE and route Astrid Perkins.      [employer]- MBB's must be scheduled at LEAST two weeks apart      [employer] - Any injection besides hip/shoulder/knee joint DO NOT SCHEDULE and route to Astrid Perkins. She will obtain PA and call pt back to schedule procedure or notify pt of denial.       HP CIG[employer]-PA REQUIRED FOR NON-MATEUSZ OR Joint injections    Any chance of pregnancy? Not Applicable   If YES, do NOT schedule and route to RN pool    Is an  needed? No     Patient has a drive home? (mandatory) YES:     Is patient taking any blood thinners (plavix, coumadin, jantoven, warfarin, heparin, pradaxa or dabigatran )? No   If hold needed, do NOT schedule, route to RN pool     Is patient taking any aspirin products? No     If more than 325mg/day do NOT schedule; route to RN pool     For CERVICAL procedures, hold all aspirin products for 6 days.      Does the patient have a bleeding or clotting disorder? No     If YES, okay to schedule AND route to RN nurse pool    **For any patients with platelet count <100, must be forwarded to provider**    Is patient diabetic?  No  If YES, have them bring their glucometer.    Does patient have an active infection or treated for one within the past week? No     Is patient currently taking any antibiotics?  No     For patients on chronic, preventative, or prophylactic antibiotics, procedures may be scheduled.     For patients on antibiotics for active or recent infection:    Suzan Cho  Carlos Spencer Burton, [place]-antibiotic course must have been completed for 4 days    Dr. Bonilla-antibiotic course must have been completed for 7 days    Is patient currently taking any steroid medications? (i.e. Prednisone, Medrol)  No     For patients on steroid medications:    Carlos Jackson Burton, [place]-steroid course must have been completed for 4 days    -steroid course must have been completed for 7 days    Reviewed with patient:  If you are started on any steroids or antibiotics between now and your appointment, you must contact us because it may affect our ability to perform your procedure.  Yes    Is patient actively being treated for cancer or immunocompromised? No  If YES, do NOT schedule and route to RN pool     Are you able to get on and off an exam table with minimal or no assistance? Yes  If NO, do NOT schedule and route to RN pool    Are you able to roll over and lay on your stomach with minimal or no assistance? Yes  If NO, do NOT schedule and route to RN pool     Any allergies to contrast dye, iodine, shellfish, or numbing and steroid medications? No  If YES, route to RN pool AND add allergy information to appointment notes    Allergies: No known drug allergies      Has the patient had a flu shot or any other vaccinations within 7 days before or after the procedure.  No     Does patient have an MRI/CT?  YES: 12/11/17  (SI joint, hip injections, lumbar sympathetic blocks, and stellate ganglion blocks do not require an MRI)    Was the MRI done w/in the last 3 years?  Yes    Was MRI done at [facility]? Yes      If not, where was it done? N/A       If MRI was not done at [facility], [facility] or [facility] do NOT schedule and route to nursing.  If pt has an imaging disc, the injection may be scheduled but pt has to bring disc to appt. If they show up w/out disc the injection cannot be done    Reminders (please tell patient if applicable):       Instructed pt to arrive 30 minutes early  for IV start if this is for a cervical procedure, ALL sympathetic (stellate ganglion, hypogastric, or lumbar sympathetic block) and all sedation procedures (RFA, spinal cord stimulation trials).  Not Applicable   -IVs are not routinely placed for Dr. Rangel cervical cases   -Dr. Melo: IVs for cervical ESIs and cervical TBDs (not CMBBs/facet inj)      If NPO for sedation, informed patient that it is okay to take medications with sips of water (except if they are to hold blood thinners).  Not Applicable   *DO take blood pressure medication if it is prescribed*      If this is for a cervical MTAEUSZ, informed patient that aspirin needs to be held for 6 days.   Not Applicable      For all patients not having spinal cord stimulator (SCS) trials or radiofrequency ablations (RFAs), informed patient:    IV sedation is not provided for this procedure.  If you feel that an oral anti-anxiety medication is needed, you can discuss this further with your referring provider or primary care provider.  The Pain Clinic provider will discuss specifics of what the procedure includes at your appointment.  Most procedures last 10-20 minutes.  We use numbing medications to help with any discomfort during the procedure.  NO      Do not schedule procedures requiring IV placement in the first appointment of the day or first appointment after lunch.       For patients 85 or older we recommend having an adult stay w/ them for the remainder of the day.       Does the patient have any questions?  NO  Silvia Stevens  [facility]

## 2017-12-19 ENCOUNTER — RADIOLOGY INJECTION OFFICE VISIT (OUTPATIENT)
Dept: PALLIATIVE MEDICINE | Facility: CLINIC | Age: 42
End: 2017-12-19
Payer: COMMERCIAL

## 2017-12-19 ENCOUNTER — RADIANT APPOINTMENT (OUTPATIENT)
Dept: GENERAL RADIOLOGY | Facility: CLINIC | Age: 42
End: 2017-12-19
Attending: PAIN MEDICINE
Payer: COMMERCIAL

## 2017-12-19 VITALS — SYSTOLIC BLOOD PRESSURE: 139 MMHG | HEART RATE: 59 BPM | OXYGEN SATURATION: 100 % | DIASTOLIC BLOOD PRESSURE: 90 MMHG

## 2017-12-19 DIAGNOSIS — M54.16 LUMBAR RADICULOPATHY: Primary | ICD-10-CM

## 2017-12-19 DIAGNOSIS — M54.16 LUMBAR RADICULAR PAIN: ICD-10-CM

## 2017-12-19 PROCEDURE — 64483 NJX AA&/STRD TFRM EPI L/S 1: CPT | Mod: RT | Performed by: PAIN MEDICINE

## 2017-12-19 NOTE — PROGRESS NOTES
Pre procedure Diagnosis: lumbar radiculopathy, lumbar degenerative disc disease   Post procedure Diagnosis: Same  Procedure performed: lumbar transforaminal epidural steroid injection at R L4-5, fluoroscopically guided, contrast controlled  Anesthesia: none  Complications: none  Operators: Mick Melo MD     Indications:   Kodak Crowe is a 42 year old male.  They have a history of bilateral low back pain radiating to his right anterior leg and medial foot.  He reports numbness and tingling.  Positive SLR on the right side exam shows.  They have tried conservative treatment including Meds/PT.    MRI   IMPRESSION:  1. Small right paramedian L4-L5 disc protrusion superimposed upon  broad-based disc bulging and degenerative facet hypertrophy resulting  in mild-to-moderate central canal stenosis and mild-to-moderate right  lateral recess stenosis. This disc protrusion is in continuity with  the right L5 nerve root.  2. Mild multilevel degenerative disc and facet disease  Options/alternatives, benefits and risks were discussed with the patient including bleeding, infection, tissue trauma, numbness, weakness, paralysis, spinal cord injury, radiation exposure, headache and reaction to medications. Questions were answered to his satisfaction and he agrees to proceed. Voluntary informed consent was obtained and signed.     Vitals were reviewed: Yes  There were no vitals taken for this visit.  Allergies were reviewed:  Yes \  Medications were reviewed:  Yes \  Pre-procedure pain score: 4/10    Procedure:  After getting informed consent, patient was brought into the procedure suite and was placed in a prone position on the procedure table.   A Pause for the Cause was performed.  Patient was prepped and draped in sterile fashion.     After identifying the right L4-5 neuroforamen, the C-arm was rotated to a right lateral oblique angle.  A total of 5ml of Lidocaine 1% was used to anesthetize the skin and the needle track at  a skin entry site coaxial with the fluoroscopy beam, and overriding the superior aspect of the neuroforamen.  A 22 gauge 3.5 inch spinal needle was advanced under intermittent fluoroscopy until it entered the foramen superiorly.    The position was then inspected from anteroposterior and lateral views, and the needle adjusted appropriately.  A total of 1ml of Omnipaque-300 was injected, confirming appropriate position, with spread into the nerve root sheath and the epidural space, with no intravascular uptake. 9ml was wasted    Then, after repeated negative aspiration, a combination of Decadron 10 mg, 0.25% bupivacaine 2 ml, diluted with 3ml of normal saline was injected.     Hemostasis was achieved, the area was cleaned, and bandaids were placed when appropriate.  The patient tolerated the procedure well, and was taken to the recovery room.    Images were saved to PACS.    Post-procedure pain score: 2/10  Follow-up includes:   -f/u phone call in one week  -f/u with referring provider    Mick Melo MD  [facility]

## 2017-12-19 NOTE — NURSING NOTE
Discharge Information    IV Discontiued Time:  NA    Amount of Fluid Infused:  NA    Discharge Criteria = When patient returns to baseline or as per MD order    Consciousness:  Pt is fully awake    Circulation:  BP +/- 20% of pre-procedure level    Respiration:  Patient is able to breathe deeply    O2 Sat:  Patient is able to maintain O2 Sat >92% on room air    Activity:  Moves 4 extremities on command    Ambulation:  Patient is able to stand and walk or stand and pivot into wheelchair    Dressing:  Clean/dry or No Dressing    Notes:   Discharge instructions and AVS given to patient    Patient meets criteria for discharge?  YES    Admitted to PCU?  No    Responsible adult present to accompany patient home?  Yes    Signature/Title:    Monisha Reeves  RN Care Coordinator  [facility]

## 2017-12-19 NOTE — MR AVS SNAPSHOT
After Visit Summary   12/19/2017    Kodak Crowe    MRN: [id number]           Patient Information     Date Of Birth          1975        Visit Information        Provider Department      12/19/2017 11:15 AM Mick Melo MD [facility]        Care Instructions    [facility] Procedure Discharge Instructions    Today you saw:   Dr. Mick Melo    Your procedure:  Epidural steroid injection       Medications used:  Lidocaine (anesthetic)  Bupivacaine (anesthetic) Dexamethasone (steroid), normal saline,  Omnipaque (contrast)             Be cautious when walking as numbness and/or weakness in the legs may occur up to 6-8 hours after the procedure due to effect of the local anesthetic    Do not drive for 6 hours. The effect of the local anesthetic could slow your reflexes.     Avoid strenuous activity for the first 24 hours. You may resume your regular activities after that.     You may shower, however avoid swimming, tub baths or hot tubs for 24 hours following your procedure    You may have a mild to moderate increase in pain for several days following the injection.      You may use ice packs for 10-15 minutes, 3 to 4 times a day at the injection site for comfort    Do not use heat to painful areas for 6 to 8 hours. This will give the local anesthetic time to wear off and prevent you from accidentally burning your skin.    You may use anti-inflammatory medications (such as Ibuprofen/Advil or Aleve) or Tylenol for pain control if necessary    With diabetes, check your blood sugar more frequently than usual as your blood sugar may be higher than normal for 10-14 days following a steroid injection. Contact your doctor who manages your diabetes if your blood sugar is higher than usual    It may take up to 14 days for the steroid medication to start working although you may feel the effect as early as a few days after the procedure.     Follow up with your  referring provider in 2-3 weeks      If you experience any of the following, call the pain center line during work hours at [phone] or on-call physician after hours at [phone]:  -Fever over 100 degree F  -Swelling, bleeding, redness, drainage, warmth at the injection site  -Progressive weakness or numbness in your legs or arms  -Loss of bowel or bladder function  -Unusual headache that is not relieved by Tylenol or your regular headache medication  -Unusual new onset of pain that is not improving    Phone #s:  Nurse triage line for general questions: [phone]            Follow-ups after your visit        Your next 10 appointments already scheduled     Dec 19, 2017 11:15 AM CST   Radiology Injections with Mick Melo MD   [facility] ([facility])    [address]  [address]  [facility] [id number] [phone]            Dec 19, 2017 11:15 AM CST   XR LUMBAR TRANSFORAMINAL INJ SINGLE with [email or web address]   [facility] ([facility])    [address]  [address]  [facility] [address]   [phone]           For nerve root injection, please send or bring copies of any MRIs or other scans you have had.  Bring a list of your current medicines to your exam. (Include vitamins, minerals and over-the-counter medicines.) Leave your valuables at home.  Plan to have someone drive you home afterward.  Stop taking the following medicines (but talk to your doctor first):   If you take blood thinners, you may need to stop taking them a few days before treatment. Talk to your doctor before stopping these medicines.Stop taking Coumadin (warfarin) 3 days before treatment. Restart the day after treatment.   If you take Plavix, Ticlid, Pletal or Persantine, please ask your doctor if you should stop these medicines. You may need extra tests on the morning of your scan.   If you take Xarelto (Rivaroxaban), you may need  "to stop taking it 24 hours before treatment. Talk to your doctor before stopping this medicine. Restart the day after treatment.  You may take your other medicines as normal.  Stop all food and drink (including water) 3 hours before your test or treatment.  Please tell the doctor:   If you are allergic to X-ray dye (contrast fluid).   If you may be pregnant.  Injections take about 30 to 45 minutes. Most people spend up to 2 hours in the clinic or hospital.  Please call the Imaging Department at your exam site with any questions.              Who to contact     If you have questions or need follow up information about today's clinic visit or your schedule please contact [facility] directly at [phone].  Normal or non-critical lab and imaging results will be communicated to you by [employer], letter or phone within 4 business days after the clinic has received the results. If you do not hear from us within 7 days, please contact the clinic through [email or web address] or phone. If you have a critical or abnormal lab result, we will notify you by phone as soon as possible.  Submit refill requests through [employer] or call your pharmacy and they will forward the refill request to us. Please allow 3 business days for your refill to be completed.          Additional Information About Your Visit        [employer] Information     [employer] lets you send messages to your doctor, view your test results, renew your prescriptions, schedule appointments and more. To sign up, go to [email or web address] . Click on \"Log in\" on the left side of the screen, which will take you to the Welcome page. Then click on \"Sign up Now\" on the right side of the page.     You will be asked to enter the access code listed below, as well as some personal information. Please follow the directions to create your username and password.     Your access code is: [email or web address]  Expires: 2018 12:44 PM     Your access code will  in 90 days. If " you need help or a new code, please call your [facility] clinic or [phone].        Care EveryWhere ID     This is your Care EveryWhere ID. This could be used by other organizations to access your [facility] medical records  [id number]        Your Vitals Were     Pulse Pulse Oximetry                62 99%           Blood Pressure from Last 3 Encounters:   12/19/17 141/84   12/06/17 142/90   01/27/17 109/70    Weight from Last 3 Encounters:   12/06/17 115.7 kg (255 lb)   01/27/17 109.8 kg (242 lb)   12/06/16 113.9 kg (251 lb)              Today, you had the following     No orders found for display       Primary Care Provider Office Phone # Fax #    Buddy Lior Gleason PA-C [phone] [address] [facility] [address]        Equal Access to Services     [facility]: Hadii aad ku hadasho Sobalbina, waaxda luqadaha, qaybta kaalmada adefelixyada, kiara rodriguez . [facility] [phone].    ATENCIÓN: Si habla español, tiene a love disposición servicios gratuitos de asistencia lingüística. Isa al [phone].    We comply with applicable federal civil rights laws and [place] laws. We do not discriminate on the basis of race, color, national origin, age, disability, sex, sexual orientation, or gender identity.            Thank you!     Thank you for choosing [facility]  for your care. Our goal is always to provide you with excellent care. Hearing back from our patients is one way we can continue to improve our services. Please take a few minutes to complete the written survey that you may receive in the mail after your visit with us. Thank you!             Your Updated Medication List - Protect others around you: Learn how to safely use, store and throw away your medicines at www.disposemymeds.org.          This list is accurate as of: 12/19/17 11:11 AM.  Always use your most recent med list.                   Brand Name Dispense Instructions for use  Diagnosis    levothyroxine 125 MCG tablet    SYNTHROID    90 tablet    Take 1 tablet (125 mcg) by mouth daily    Congenital hypothyroidism without goiter          yes

## 2017-12-19 NOTE — PATIENT INSTRUCTIONS
[facility] Procedure Discharge Instructions    Today you saw:   Dr. Mick Melo    Your procedure:  Epidural steroid injection       Medications used:  Lidocaine (anesthetic)  Bupivacaine (anesthetic) Dexamethasone (steroid), normal saline,  Omnipaque (contrast)             Be cautious when walking as numbness and/or weakness in the legs may occur up to 6-8 hours after the procedure due to effect of the local anesthetic    Do not drive for 6 hours. The effect of the local anesthetic could slow your reflexes.     Avoid strenuous activity for the first 24 hours. You may resume your regular activities after that.     You may shower, however avoid swimming, tub baths or hot tubs for 24 hours following your procedure    You may have a mild to moderate increase in pain for several days following the injection.      You may use ice packs for 10-15 minutes, 3 to 4 times a day at the injection site for comfort    Do not use heat to painful areas for 6 to 8 hours. This will give the local anesthetic time to wear off and prevent you from accidentally burning your skin.    You may use anti-inflammatory medications (such as Ibuprofen/Advil or Aleve) or Tylenol for pain control if necessary    With diabetes, check your blood sugar more frequently than usual as your blood sugar may be higher than normal for 10-14 days following a steroid injection. Contact your doctor who manages your diabetes if your blood sugar is higher than usual    It may take up to 14 days for the steroid medication to start working although you may feel the effect as early as a few days after the procedure.     Follow up with your referring provider in 2-3 weeks      If you experience any of the following, call the pain center line during work hours at [phone] or on-call physician after hours at [phone]:  -Fever over 100 degree F  -Swelling, bleeding, redness, drainage, warmth at the injection site  -Progressive weakness or numbness  in your legs or arms  -Loss of bowel or bladder function  -Unusual headache that is not relieved by Tylenol or your regular headache medication  -Unusual new onset of pain that is not improving    Phone #s:  Nurse triage line for general questions: [phone]

## 2018-01-10 ENCOUNTER — TELEPHONE (OUTPATIENT)
Dept: NEUROSURGERY | Facility: CLINIC | Age: 43
End: 2018-01-10

## 2018-01-10 DIAGNOSIS — M54.16 LUMBAR RADICULAR PAIN: Primary | ICD-10-CM

## 2018-01-10 NOTE — TELEPHONE ENCOUNTER
REASON FOR CALL:  Madelyn saw this patient in December, and after that he had an MRI and an injection. He called saying the injection hasn't provided the relief he thought it would and wants to know if he should come in and see you to try something else? I sent a message to Madelyn and she said that his MRI is negative and to send him to triage.     Detailed message can be left:  YES

## 2018-01-11 NOTE — TELEPHONE ENCOUNTER
"Spoke to Kodak via phone to review symptoms.  He reports that injection was completed on 12/19/17.  Prior to injection his concerns were lower back shooting pain as well as RIGHT LE numbness from the knee down which he describes as \"frozen feeling/sleeping\"  For the first few weeks after injection he did not some improvement in pain level and perhaps some improvement in the level of numbness on that right side.  About 7-10 days ago Kodak began to note similar symptoms developing on the LEFT - this he states is new and was not present prior to injection.  He would like to discuss next step/recommendations and report the new symptom.  This will be discussed with his care team.    "

## 2018-01-12 ENCOUNTER — TELEPHONE (OUTPATIENT)
Dept: PALLIATIVE MEDICINE | Facility: CLINIC | Age: 43
End: 2018-01-12

## 2018-01-12 NOTE — TELEPHONE ENCOUNTER
Recommendations per Madelyn Hoffmann NP to proceed with a 2nd injection and referral to PT.  Kodak verbalized understanding and agreement with these recommendations.  Orders placed.  He has no further questions at this time

## 2018-01-12 NOTE — TELEPHONE ENCOUNTER
Pre-screening Questions for Radiology Injections:    Injection to be done at which interventional clinic site? [facility]    Procedure ordered by SUSAN GAMEZ    Procedure ordered? Lumbar Epidural Steroid Injection    What insurance would patient like us to bill for this procedure? BLUE PLUS      Worker's comp or MVA (motor vehicle accident) -Any injection DO NOT SCHEDULE and route to Taylor Villalba.      [employer] insurance - For SI joint injections, DO NOT SCHEDULE and route Astrid Perkins.      [employer]- MBB's must be scheduled at LEAST two weeks apart      [employer] - Any injection besides hip/shoulder/knee joint DO NOT SCHEDULE and route to Astrid Perkins. She will obtain PA and call pt back to schedule procedure or notify pt of denial.       HP C[employer]-PA REQUIRED FOR NON-MATEUSZ OR Joint injections    Any chance of pregnancy? Not Applicable   If YES, do NOT schedule and route to RN pool    Is an  needed? No     Patient has a drive home? (mandatory) YES:     Is patient taking any blood thinners (plavix, coumadin, jantoven, warfarin, heparin, pradaxa or dabigatran )? No   If hold needed, do NOT schedule, route to RN pool     Is patient taking any aspirin products? No     If more than 325mg/day do NOT schedule; route to RN pool     For CERVICAL procedures, hold all aspirin products for 6 days.      Does the patient have a bleeding or clotting disorder? No     If YES, okay to schedule AND route to RN nurse pool    **For any patients with platelet count <100, must be forwarded to provider**    Is patient diabetic?  No  If YES, have them bring their glucometer.    Does patient have an active infection or treated for one within the past week? No     Is patient currently taking any antibiotics?  No     For patients on chronic, preventative, or prophylactic antibiotics, procedures may be scheduled.     For patients on antibiotics for active or recent infection:    Carlos Jackson,  Kameron Rangel-antibiotic course must have been completed for 4 days    Dr. Bonilla-antibiotic course must have been completed for 7 days    Is patient currently taking any steroid medications? (i.e. Prednisone, Medrol)  No     For patients on steroid medications:    Carlos Jackson Burton, Snitzer-steroid course must have been completed for 4 days    -steroid course must have been completed for 7 days    Reviewed with patient:  If you are started on any steroids or antibiotics between now and your appointment, you must contact us because it may affect our ability to perform your procedure.  Yes    Is patient actively being treated for cancer or immunocompromised? No  If YES, do NOT schedule and route to RN pool     Are you able to get on and off an exam table with minimal or no assistance? Yes  If NO, do NOT schedule and route to RN pool    Are you able to roll over and lay on your stomach with minimal or no assistance? Yes  If NO, do NOT schedule and route to RN pool     Any allergies to contrast dye, iodine, shellfish, or numbing and steroid medications? No  If YES, route to RN pool AND add allergy information to appointment notes    Allergies: No known drug allergies      Has the patient had a flu shot or any other vaccinations within 7 days before or after the procedure.  No     Does patient have an MRI/CT?  YES:   (SI joint, hip injections, lumbar sympathetic blocks, and stellate ganglion blocks do not require an MRI)    Was the MRI done w/in the last 3 years?  Yes    Was MRI done at [place]? Yes      If not, where was it done? N/A       If MRI was not done at [facility], [facility] or [facility] do NOT schedule and route to nursing.  If pt has an imaging disc, the injection may be scheduled but pt has to bring disc to appt. If they show up w/out disc the injection cannot be done    Reminders (please tell patient if applicable):       Instructed pt to arrive 30 minutes early for IV start if this is  for a cervical procedure, ALL sympathetic (stellate ganglion, hypogastric, or lumbar sympathetic block) and all sedation procedures (RFA, spinal cord stimulation trials).  Not Applicable   -IVs are not routinely placed for Dr. Rangel cervical cases   -Dr. Melo: IVs for cervical ESIs and cervical TBDs (not CMBBs/facet inj)      If NPO for sedation, informed patient that it is okay to take medications with sips of water (except if they are to hold blood thinners).  Not Applicable   *DO take blood pressure medication if it is prescribed*      If this is for a cervical MATEUSZ, informed patient that aspirin needs to be held for 6 days.   Not Applicable      For all patients not having spinal cord stimulator (SCS) trials or radiofrequency ablations (RFAs), informed patient:    IV sedation is not provided for this procedure.  If you feel that an oral anti-anxiety medication is needed, you can discuss this further with your referring provider or primary care provider.  The Pain Clinic provider will discuss specifics of what the procedure includes at your appointment.  Most procedures last 10-20 minutes.  We use numbing medications to help with any discomfort during the procedure.  Not Applicable      Do not schedule procedures requiring IV placement in the first appointment of the day or first appointment after lunch.       For patients 85 or older we recommend having an adult stay w/ them for the remainder of the day.       Does the patient have any questions?    Astrid Perkins  [facility]

## 2018-01-16 ENCOUNTER — RADIANT APPOINTMENT (OUTPATIENT)
Dept: GENERAL RADIOLOGY | Facility: CLINIC | Age: 43
End: 2018-01-16
Attending: PAIN MEDICINE
Payer: COMMERCIAL

## 2018-01-16 ENCOUNTER — RADIOLOGY INJECTION OFFICE VISIT (OUTPATIENT)
Dept: PALLIATIVE MEDICINE | Facility: CLINIC | Age: 43
End: 2018-01-16
Attending: NURSE PRACTITIONER
Payer: COMMERCIAL

## 2018-01-16 VITALS — SYSTOLIC BLOOD PRESSURE: 124 MMHG | HEART RATE: 55 BPM | DIASTOLIC BLOOD PRESSURE: 80 MMHG | OXYGEN SATURATION: 99 %

## 2018-01-16 DIAGNOSIS — M54.16 LUMBAR RADICULOPATHY: Primary | ICD-10-CM

## 2018-01-16 DIAGNOSIS — M54.16 LUMBAR RADICULAR PAIN: ICD-10-CM

## 2018-01-16 PROCEDURE — 62323 NJX INTERLAMINAR LMBR/SAC: CPT | Performed by: PAIN MEDICINE

## 2018-01-16 NOTE — NURSING NOTE
Discharge Information    IV Discontiued Time:  NA    Amount of Fluid Infused:  NA    Discharge Criteria = When patient returns to baseline or as per MD order    Consciousness:  Pt is fully awake    Circulation:  BP +/- 20% of pre-procedure level    Respiration:  Patient is able to breathe deeply    O2 Sat:  Patient is able to maintain O2 Sat >92% on room air    Activity:  Moves 4 extremities on command    Ambulation:  Patient is able to stand and walk or stand and pivot into wheelchair    Dressing:  Clean/dry or No Dressing    Notes:   Discharge instructions and AVS given to patient    Patient meets criteria for discharge?  YES    Admitted to PCU?  No    Responsible adult present to accompany patient home?  Yes    Signature/Title:    Monisha Reeves  RN Care Coordinator  [facility]

## 2018-01-16 NOTE — MR AVS SNAPSHOT
After Visit Summary   1/16/2018    Kodak Crowe    MRN: [id number]           Patient Information     Date Of Birth          1975        Visit Information        Provider Department      1/16/2018 8:15 AM Mick Melo MD [facility]        Care Instructions    [facility] Procedure Discharge Instructions    Today you saw:    Dr. Mick Melo    Your procedure:  Epidural steroid injection      Medications used:  Lidocaine (local anesthetic)  Bupivacaine (anesthetic)   Kenalog (steroid)  Normal Saline Omnipaque (contrast)          Be cautious when walking as numbness and/or weakness in the legs may occur up to 6-8 hours after the procedure due to effect of the local anesthetic    Do not drive for 6 hours. The effect of the local anesthetic could slow your reflexes.     Avoid strenuous activity for the first 24 hours. You may resume your regular activities after that.     You may shower, however avoid swimming, tub baths or hot tubs for 24 hours following your procedure    You may have a mild to moderate increase in pain for several days following the injection.      You may use ice packs for 10-15 minutes, 3 to 4 times a day at the injection site for comfort    Do not use heat to painful areas for 6 to 8 hours. This will give the local anesthetic time to wear off and prevent you from accidentally burning your skin.    You may use anti-inflammatory medications (such as Ibuprofen/Advil or Aleve) or Tylenol for pain control if necessary    With diabetes, check your blood sugar more frequently than usual as your blood sugar may be higher than normal for 10-14 days following a steroid injection. Contact your doctor who manages your diabetes if your blood sugar is higher than usual    It may take up to 14 days for the steroid medication to start working although you may feel the effect as early as a few days after the procedure.     Follow up with your referring  "provider in 2-3 weeks      If you experience any of the following, call the pain center line during work hours at [phone] or on-call physician after hours at [phone]:  -Fever over 100 degree F  -Swelling, bleeding, redness, drainage, warmth at the injection site  -Progressive weakness or numbness in your legs or arms  -Loss of bowel or bladder function  -Unusual headache that is not relieved by Tylenol or your regular headache medication  -Unusual new onset of pain that is not improving    Phone #s:  Nurse triage line for general questions: [phone]            Follow-ups after your visit        Who to contact     If you have questions or need follow up information about today's clinic visit or your schedule please contact [facility] directly at [phone].  Normal or non-critical lab and imaging results will be communicated to you by [employer]mos[employer], letter or phone within 4 business days after the clinic has received the results. If you do not hear from us within 7 days, please contact the clinic through [employer] or phone. If you have a critical or abnormal lab result, we will notify you by phone as soon as possible.  Submit refill requests through [employer] or call your pharmacy and they will forward the refill request to us. Please allow 3 business days for your refill to be completed.          Additional Information About Your Visit        [employer] Information     [employer] lets you send messages to your doctor, view your test results, renew your prescriptions, schedule appointments and more. To sign up, go to [email or web address] . Click on \"Log in\" on the left side of the screen, which will take you to the Welcome page. Then click on \"Sign up Now\" on the right side of the page.     You will be asked to enter the access code listed below, as well as some personal information. Please follow the directions to create your username and password.     Your access code is: [email or web address]  Expires: " 2018  8:30 AM     Your access code will  in 90 days. If you need help or a new code, please call your [place] clinic or [phone].        Care EveryWhere ID     This is your Care EveryWhere ID. This could be used by other organizations to access your [place] medical records  [id number]         Blood Pressure from Last 3 Encounters:   17 139/90   17 142/90   17 109/70    Weight from Last 3 Encounters:   17 115.7 kg (255 lb)   17 109.8 kg (242 lb)   16 113.9 kg (251 lb)              Today, you had the following     No orders found for display       Primary Care Provider Office Phone # Fax #    Buddy Lior Gleason PA-C [phone] [address] [facility] [address]        Equal Access to Services     [facility] Center[facility]TH[facility] : Hadii aad ku hadasho Sotoñaali, waaxda luqadaha, qaybta kaalmada adeegyada, waxay ligia rodriguez . So [facility] [phone].    ATENCIÓN: Si habla español, tiene a love disposición servicios gratuitos de asistencia lingüística. Llnilam al [phone].    We comply with applicable federal civil rights laws and [place] laws. We do not discriminate on the basis of race, color, national origin, age, disability, sex, sexual orientation, or gender identity.            Thank you!     Thank you for choosing [facility]  for your care. Our goal is always to provide you with excellent care. Hearing back from our patients is one way we can continue to improve our services. Please take a few minutes to complete the written survey that you may receive in the mail after your visit with us. Thank you!             Your Updated Medication List - Protect others around you: Learn how to safely use, store and throw away your medicines at www.disposemymeds.org.          This list is accurate as of: 18  8:30 AM.  Always use your most recent med list.                   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

## 2018-01-16 NOTE — PROGRESS NOTES
Pre procedure Diagnosis: lumbar radiculopathy    Post procedure Diagnosis: Same  Procedure performed: L5-S1 interlaminar epidural steroid injection   Anesthesia: none  Complications: none  Operators: Mick Melo MD     Indications:   Kodak Crowe is a 42 year old male.  They have a history of bilateral low back pain radiating to his right anterior leg and medial foot>L. Pt had a R l4-5TFESI 12/19 with sig relief, but still having pain.  Additionally, he reports worsening L sided pain.  He reports numbness and tingling.  + SLR on the right side neg on the Left exam shows.  They have tried conservative treatment including Meds/PT.    MRI   reviewed  Options/alternatives, benefits and risks were discussed with the patient including but not limited to bleeding, infection, no pain relief, tissue trauma, exposure to radiation, reaction to medications, spinal cord injury, dural puncture, weakness, numbness and headache.  Questions were answered to his satisfaction and he wishes to proceed. Voluntary informed consent was obtained and signed.     Vitals were reviewed: Yes  Allergies were reviewed:  Yes   Medications were reviewed:  Yes   Pre-procedure pain score: 5/10    Procedure:  The patient's medical history, medications, and allergies were reviewed and reconciled.  After obtaining signed informed consent, the patient was brought into the procedure suite and was placed in a prone position on the procedure table.   A Pause for the Cause was performed.  Patient was prepped and draped in the usual sterile fashion.     The L5-S1 interspace was identified with AP fluoroscopy.  A total of 6ml of 1% lidocaine was used to anesthetize the skin and subcutaneous tissues for a  midline approach.    A 20gauge 3.5inch Touhy needle was advanced utilizing intermittent AP and Lateral fluoroscopy and air for loss of resistance.  The epidural space was encountered on the first pass without difficulties.  Aspiration for blood and CSF  was negative.  Needle position was verified by injecting 1 ml of Omnipaque utilizing real-time fluoroscopy that showed good needle placement and epidural spread without signs of intravascular or intrathecal uptake.  Omnipaque wasted:  9 ml.    Then, after repeated negative aspiration for blood or CSF, a combination of Kenalog 40 mg, Bupivicaine 0.25% 2 ml, diluted with 3 ml of normal saline to a total injectate volume of 6 ml was injected into the epidural space in a slow and incremental fashion and the needle was restyletted and withdrawn.  All injected medications were preservative free.    The injection site was cleaned and a sterile dressing was applied.    The patient tolerated the procedure well without complications and was taken to the recovery room for continued observation.    Images were saved to PACS.    Post-procedure pain score: 2/10  Follow-up includes:   -f/u phone call in one week  -f/u with referring provider   - would consider repeat R tfesi if no relief with ILESI   Mick Melo MD  [facility]

## 2018-01-16 NOTE — Clinical Note
Pt reported worsening symptoms on the Left today, so ended up preforming an ILESI. Discussed with pt the differences between the procedures. And to be mindful if there are dif in relief.

## 2018-01-16 NOTE — PATIENT INSTRUCTIONS
[facility] Procedure Discharge Instructions    Today you saw:    Dr. Mick Melo    Your procedure:  Epidural steroid injection      Medications used:  Lidocaine (local anesthetic)  Bupivacaine (anesthetic)   Kenalog (steroid)  Normal Saline Omnipaque (contrast)          Be cautious when walking as numbness and/or weakness in the legs may occur up to 6-8 hours after the procedure due to effect of the local anesthetic    Do not drive for 6 hours. The effect of the local anesthetic could slow your reflexes.     Avoid strenuous activity for the first 24 hours. You may resume your regular activities after that.     You may shower, however avoid swimming, tub baths or hot tubs for 24 hours following your procedure    You may have a mild to moderate increase in pain for several days following the injection.      You may use ice packs for 10-15 minutes, 3 to 4 times a day at the injection site for comfort    Do not use heat to painful areas for 6 to 8 hours. This will give the local anesthetic time to wear off and prevent you from accidentally burning your skin.    You may use anti-inflammatory medications (such as Ibuprofen/Advil or Aleve) or Tylenol for pain control if necessary    With diabetes, check your blood sugar more frequently than usual as your blood sugar may be higher than normal for 10-14 days following a steroid injection. Contact your doctor who manages your diabetes if your blood sugar is higher than usual    It may take up to 14 days for the steroid medication to start working although you may feel the effect as early as a few days after the procedure.     Follow up with your referring provider in 2-3 weeks      If you experience any of the following, call the pain center line during work hours at [phone] or on-call physician after hours at [phone]:  -Fever over 100 degree F  -Swelling, bleeding, redness, drainage, warmth at the injection site  -Progressive weakness or numbness in  your legs or arms  -Loss of bowel or bladder function  -Unusual headache that is not relieved by Tylenol or your regular headache medication  -Unusual new onset of pain that is not improving    Phone #s:  Nurse triage line for general questions: [phone]

## 2018-01-23 ENCOUNTER — TELEPHONE (OUTPATIENT)
Dept: PALLIATIVE MEDICINE | Facility: CLINIC | Age: 43
End: 2018-01-23

## 2018-01-23 NOTE — TELEPHONE ENCOUNTER
Patient had a  L5-S1 interlaminar epidural steroid injection on 1/16/17.  Called patient for an update.      Left message that we were calling for an update about how he was doing after the injection.  LM that if he has any problems or questions to call the nurse line at [phone].

## 2018-01-26 ENCOUNTER — THERAPY VISIT (OUTPATIENT)
Dept: PHYSICAL THERAPY | Facility: CLINIC | Age: 43
End: 2018-01-26
Payer: COMMERCIAL

## 2018-01-26 DIAGNOSIS — M54.16 LUMBAR RADICULOPATHY: Primary | ICD-10-CM

## 2018-01-26 PROCEDURE — 97161 PT EVAL LOW COMPLEX 20 MIN: CPT | Mod: GP | Performed by: PHYSICAL THERAPIST

## 2018-01-26 PROCEDURE — 97110 THERAPEUTIC EXERCISES: CPT | Mod: GP | Performed by: PHYSICAL THERAPIST

## 2018-01-26 PROCEDURE — 97112 NEUROMUSCULAR REEDUCATION: CPT | Mod: GP | Performed by: PHYSICAL THERAPIST

## 2018-01-26 NOTE — MR AVS SNAPSHOT
"              After Visit Summary   2018    Kodak Crowe    MRN: [id number]           Patient Information     Date Of Birth          1975        Visit Information        Provider Department      2018 12:20 PM Nola West PT [facility]        Today's Diagnoses     Lumbar radiculopathy    -  1       Follow-ups after your visit        Who to contact     If you have questions or need follow up information about today's clinic visit or your schedule please contact [facility] directly at [phone].  Normal or non-critical lab and imaging results will be communicated to you by Arar[employer]art, letter or phone within 4 business days after the clinic has received the results. If you do not hear from us within 7 days, please contact the clinic through [employer] or phone. If you have a critical or abnormal lab result, we will notify you by phone as soon as possible.  Submit refill requests through [employer] or call your pharmacy and they will forward the refill request to us. Please allow 3 business days for your refill to be completed.          Additional Information About Your Visit        MyChart Information     [employer] lets you send messages to your doctor, view your test results, renew your prescriptions, schedule appointments and more. To sign up, go to [email or web address] . Click on \"Log in\" on the left side of the screen, which will take you to the Welcome page. Then click on \"Sign up Now\" on the right side of the page.     You will be asked to enter the access code listed below, as well as some personal information. Please follow the directions to create your username and password.     Your access code is: [email or web address]  Expires: 2018  8:30 AM     Your access code will  in 90 days. If you need help or a new code, please call your [place] clinic or [phone].        Care EveryWhere ID     This is your Care EveryWhere ID. This " could be used by other organizations to access your [facility] medical records  [id number]         Blood Pressure from Last 3 Encounters:   01/16/18 124/80   12/19/17 139/90   12/06/17 142/90    Weight from Last 3 Encounters:   12/06/17 115.7 kg (255 lb)   01/27/17 109.8 kg (242 lb)   12/06/16 113.9 kg (251 lb)              We Performed the Following     HC PT EVAL, LOW COMPLEXITY     NEUROMUSCULAR RE-EDUCATION     THERAPEUTIC EXERCISES        Primary Care Provider Office Phone # Fax #    Buddy Lior Gleason PA-C [phone] [facility] [address]        Equal Access to Services     SANDRA JIMÉNEZ : Hadii chandni Waldrop, waivaniada simi, qaybta kaalmada adefelixyacassi, kiara rodriguez . So [facility] [phone].    ATENCIÓN: Si habla español, tiene a love disposición servicios gratuitos de asistencia lingüística. Llame al [phone].    We comply with applicable federal civil rights laws and [place] laws. We do not discriminate on the basis of race, color, national origin, age, disability, sex, sexual orientation, or gender identity.            Thank you!     Thank you for choosing [facility]  for your care. Our goal is always to provide you with excellent care. Hearing back from our patients is one way we can continue to improve our services. Please take a few minutes to complete the written survey that you may receive in the mail after your visit with us. Thank you!             Your Updated Medication List - Protect others around you: Learn how to safely use, store and throw away your medicines at www.disposemymeds.org.          This list is accurate as of 1/26/18  1:18 PM.  Always use your most recent med list.                   Brand Name Dispense Instructions for use Diagnosis    levothyroxine 125 MCG tablet    SYNTHROID    90 tablet    Take 1 tablet (125 mcg) by mouth daily    Congenital hypothyroidism without  goiter

## 2018-01-26 NOTE — PROGRESS NOTES
[facility] Initial Evaluation  Subjective:  HPI                    Objective:  System    Physical Exam    General     ROS  Patient is a 42 year old male presenting with R lower leg numbness and L lateral hip pain. The history is provided by the patient. No  was used.   Condition occurred with:  Insidious onset.    This is a chronic condition since 10/2017. Recent MATEUSZ improved symptoms  .    Patient reports pain:  R lateral calf, L lateral hip.    Pain is described as aching and is intermittent and reported as 5/10.  Associated symptoms:  Loss of motion/stiffness  Symptoms are exacerbated by sitting and transfers and relieved by rest.  Since onset symptoms are gradually improving.  Special tests:  X-ray, MRI.      General health as reported by patient is good.  Pertinent medical history includes:  chronic episodic LBP.  Medical allergies: no.  Other surgeries include:  None reported.  Current medications:  None as reported by the patient.  Current occupation is [employer].  Patient is working in normal job without restrictions.  Primary job tasks include:  Lifting, repetitive tasks and other.     Barriers include:  Stairs and transportation.     Red flags:  None as reported by the patient.  1  Objective:     Hip Evaluation  PROM; limitation into L IR and R ER, B SLR tight      Hip Strength:  Hip Strength:    Left:    Normal  Right:  Normal     Knee Evaluation:  ROM:  AROM: normal  PROM: normal      Strength:      Extension:  Left:/5  Strong       Right:/5  Strong/pain free    Flexion:  Left:/5  Strong      Right:/5  Strong/pain free   Quad Set Left: Good      Quad Set Right: Good         Functional Testin/5 squat B             Art Lumbar Evaluation     Posture:  Sitting: fair  Standing: fair  Lordosis: WNL  Lateral Shift: no  Correction of Posture: no effect     Movement Loss:     Extension (EXT): mod     Conclusion: other (provisional classification:  HNP, resolving)                                   Assessment/Plan:       Patient is a 42 year old male with lumbar complaints.    Patient has the following significant findings with corresponding treatment plan.                Diagnosis 1:  Lumbar derangement  Pain -  manual therapy, education, directional preference exercise and home program  Decreased function - therapeutic activities and home program     Therapy Evaluation Codes:   1. History comprised of:                           Personal factors that impact the plan of care:                                                      Past/current experiences.                            Comorbidity factors that impact the plan of care are:                                                      None.                             Medications impacting care: None.  2. Examination of Body Systems comprised of:                           Body structures and functions that impact the plan of care:                                                      Knee and Lumbar spine.                           Activity limitations that impact the plan of care are:                                                      Bending, Driving, Jumping, Running, Sitting, Sports, Squatting/kneeling and Stairs.  3. Clinical presentation characteristics are:                           Stable/Uncomplicated.  4. Decision-Making                                                    Low complexity using standardized patient assessment instrument and/or measureable assessment of functional outcome.  Cumulative Therapy Evaluation is: Low complexity.     Previous and current functional limitations:  (See Goal Flow Sheet for this information)    Short term and Long term goals: (See Goal Flow Sheet for this information)      Communication ability:  Patient appears to be able to clearly communicate and understand verbal and written communication and follow directions correctly.  Treatment Explanation - The following  has been discussed with the patient:   RX ordered/plan of care  Anticipated outcomes  Possible risks and side effects  This patient would benefit from PT intervention to resume normal activities.   Rehab potential is good.     Frequency:  1 X week, once daily  Duration:  for 1 weeks  Discharge Plan:  Achieve all LTG.  Independent in home treatment program.  Reach maximal therapeutic benefit.     Please refer to the daily flowsheet for treatment today, total treatment time and time spent performing 1:1 timed codes.

## 2018-03-09 ENCOUNTER — HOSPITAL ENCOUNTER (OUTPATIENT)
Dept: MRI IMAGING | Facility: CLINIC | Age: 43
Discharge: HOME | End: 2018-03-09
Attending: NURSE PRACTITIONER | Admitting: NURSE PRACTITIONER
Payer: COMMERCIAL

## 2018-03-09 ENCOUNTER — OFFICE VISIT (OUTPATIENT)
Dept: NEUROSURGERY | Facility: CLINIC | Age: 43
End: 2018-03-09
Attending: NURSE PRACTITIONER
Payer: COMMERCIAL

## 2018-03-09 VITALS
SYSTOLIC BLOOD PRESSURE: 161 MMHG | HEART RATE: 56 BPM | BODY MASS INDEX: 34.54 KG/M2 | OXYGEN SATURATION: 97 % | HEIGHT: 72 IN | WEIGHT: 255 LBS | DIASTOLIC BLOOD PRESSURE: 96 MMHG

## 2018-03-09 DIAGNOSIS — M54.16 LUMBAR RADICULAR PAIN: Primary | ICD-10-CM

## 2018-03-09 DIAGNOSIS — M54.16 LUMBAR RADICULAR PAIN: ICD-10-CM

## 2018-03-09 PROCEDURE — 72148 MRI LUMBAR SPINE W/O DYE: CPT

## 2018-03-09 PROCEDURE — G0463 HOSPITAL OUTPT CLINIC VISIT: HCPCS | Mod: 25 | Performed by: NURSE PRACTITIONER

## 2018-03-09 PROCEDURE — 99213 OFFICE O/P EST LOW 20 MIN: CPT | Performed by: NURSE PRACTITIONER

## 2018-03-09 RX ORDER — METHYLPREDNISOLONE 4 MG
TABLET, DOSE PACK ORAL
Qty: 21 TABLET | Refills: 0 | Status: SHIPPED | OUTPATIENT
Start: 2018-03-09 | End: 2018-03-28

## 2018-03-09 ASSESSMENT — PAIN SCALES - GENERAL: PAINLEVEL: SEVERE PAIN (7)

## 2018-03-09 NOTE — PATIENT INSTRUCTIONS
1. Please call [facility] to have lumbar MRI completed. Call [phone].   I will contact you with results.     2. Medrol dose pack for inflammation

## 2018-03-09 NOTE — NURSING NOTE
Kodak Crowe is a 42 year old male who presents for:  Chief Complaint   Patient presents with     Neurologic Problem     Low back pain continues tried injections but provided relief for a little while.        Initial Vitals:  BP (!) 161/96 (BP Location: Right arm, Patient Position: Sitting, Cuff Size: Adult Regular)  Pulse 56  Ht 6' (1.829 m)  Wt 255 lb (115.7 kg)  SpO2 97%  BMI 34.58 kg/m2 Estimated body mass index is 34.58 kg/(m^2) as calculated from the following:    Height as of this encounter: 6' (1.829 m).    Weight as of this encounter: 255 lb (115.7 kg).. Body surface area is 2.42 meters squared. BP completed using cuff size: regular  Severe Pain (7)    Do you feel safe in your environment?  Yes  Do you need any refills today? No    Nursing Comments: Low back pain continues tried injections but provided relief for a little while.        5 min. nursing intake time  Lorna Shankar MA       Discharge plan: 1. Please call [facility] to have lumbar MRI completed. Call [phone].   I will contact you with results.     2. Medrol dose pack for inflammation   2 min. nursing discharge time  Lorna Shankar MA

## 2018-03-09 NOTE — PROGRESS NOTES
[facility]  Neurosurgery followup:    HPI: Mr. Crowe is a 42 year old male that returns with a history of right lumbar radicular pain. He has had 2 LESI's and has had great relief.  He noted that the pain started up again on  for no reason. He states that now the pain is unbearable.  He notes pain to his low back down his right lateral thigh to the ankle with some inner thigh pain as well.  He states that nothing seems to help the pain.        Exam:  Constitutional:  Alert, well nourished, NAD.  HEENT: Normocephalic, atraumatic.   Pulm:  Without shortness of breath   CV:  No pitting edema of BLE.      Neurological:  Awake  Alert  Oriented x 3  Motor exam:        IP Q DF PF EHL  R   5  5   5   5    5  L   5  5   5   5    5     Able to spontaneously move L/E bilaterally  Sensation intact throughout all L/E dermatomes    Pain with ROM in all planes.      Positive straight leg raise on the right.       Imagin. Small right paramedian L4-L5 disc protrusion superimposed upon  broad-based disc bulging and degenerative facet hypertrophy resulting  in mild-to-moderate central canal stenosis and mild-to-moderate right  lateral recess stenosis. This disc protrusion is in continuity with  the right L5 nerve root.  2. Mild multilevel degenerative disc and facet disease.    A/P: Mr. Crowe is a 42 year old male that returns with a history of right lumbar radicular pain. He has had 2 LESI's and has had great relief.  He noted that the pain started up again on  for no reason. He states that now the pain is unbearable.  He notes pain to his low back down his right lateral thigh to the ankle with some inner thigh pain as well.  He states that nothing seems to help the pain.  He is visibly uncomfortable but neurologically intact. His previous MRI showed small right disc bulge at L4-5.  Will repeat MRI to see if this has worsened and have him try a Medrol dose pack.  Pt states that the pain has been terrible  and he has not been able to get in. It was explained that he should go to ER if the pain is severe since he was not able to get in to our clinic and the pain began last week.      Patient Instructions   1. Please call [facility] to have lumbar MRI completed. Call [phone].   I will contact you with results.     2. Medrol dose pack for inflammation        Madelyn Hoffmann [facility]  [facility]  [address] [address]    Tel [phone]  Pager [phone]

## 2018-03-09 NOTE — LETTER
3/9/2018         RE: Kodak Crowe  [address]  [place] [address]        Dear Colleague,    Thank you for referring your patient, Kodak Crowe, to the [facility]. Please see a copy of my visit note below.    [facility]  Neurosurgery followup:    HPI: Mr. Crowe is a 42 year old male that returns with a history of right lumbar radicular pain. He has had 2 LESI's and has had great relief.  He noted that the pain started up again on  for no reason. He states that now the pain is unbearable.  He notes pain to his low back down his right lateral thigh to the ankle with some inner thigh pain as well.  He states that nothing seems to help the pain.        Exam:  Constitutional:  Alert, well nourished, NAD.  HEENT: Normocephalic, atraumatic.   Pulm:  Without shortness of breath   CV:  No pitting edema of BLE.      Neurological:  Awake  Alert  Oriented x 3  Motor exam:        IP Q DF PF EHL  R   5  5   5   5    5  L   5  5   5   5    5     Able to spontaneously move L/E bilaterally  Sensation intact throughout all L/E dermatomes    Pain with ROM in all planes.      Positive straight leg raise on the right.       Imagin. Small right paramedian L4-L5 disc protrusion superimposed upon  broad-based disc bulging and degenerative facet hypertrophy resulting  in mild-to-moderate central canal stenosis and mild-to-moderate right  lateral recess stenosis. This disc protrusion is in continuity with  the right L5 nerve root.  2. Mild multilevel degenerative disc and facet disease.    A/P: Mr. Crowe is a 42 year old male that returns with a history of right lumbar radicular pain. He has had 2 LESI's and has had great relief.  He noted that the pain started up again on  for no reason. He states that now the pain is unbearable.  He notes pain to his low back down his right lateral thigh to the ankle with some inner thigh pain as well.  He states that nothing seems to help the  pain.  He is visibly uncomfortable but neurologically intact. His previous MRI showed small right disc bulge at L4-5.  Will repeat MRI to see if this has worsened and have him try a Medrol dose pack.  Pt states that the pain has been terrible and he has not been able to get in. It was explained that he should go to ER if the pain is severe since he was not able to get in to our clinic and the pain began last week.      Patient Instructions   1. Please call [facility] to have lumbar MRI completed. Call [phone].   I will contact you with results.     2. Medrol dose pack for inflammation        Madelyn Hoffmann CNP  [facility]  [address] [address]    Tel [phone]  Pager [phone]        Again, thank you for allowing me to participate in the care of your patient.        Sincerely,        EUSEBIO Pradhan CNP

## 2018-03-09 NOTE — MR AVS SNAPSHOT
"              After Visit Summary   3/9/2018    Kodak Crowe    MRN: [id number]           Patient Information     Date Of Birth          1975        Visit Information        Provider Department      3/9/2018 1:20 PM Madelyn Hoffmann APRN CNP [facility]        Today's Diagnoses     Lumbar radicular pain    -  1      Care Instructions    1. Please call [facility] to have lumbar MRI completed. Call [phone].   I will contact you with results.     2. Medrol dose pack for inflammation           Follow-ups after your visit        Future tests that were ordered for you today     Open Future Orders        Priority Expected Expires Ordered    MR Lumbar Spine w/o Contrast Routine  3/9/2019 3/9/2018            Who to contact     If you have questions or need follow up information about today's clinic visit or your schedule please contact [facility] directly at [phone].  Normal or non-critical lab and imaging results will be communicated to you by MyChart, letter or phone within 4 business days after the clinic has received the results. If you do not hear from us within 7 days, please contact the clinic through [employer] or phone. If you have a critical or abnormal lab result, we will notify you by phone as soon as possible.  Submit refill requests through [employer] or call your pharmacy and they will forward the refill request to us. Please allow 3 business days for your refill to be completed.          Additional Information About Your Visit        MyChart Information     [employer] lets you send messages to your doctor, view your test results, renew your prescriptions, schedule appointments and more. To sign up, go to [email or web address] . Click on \"Log in\" on the left side of the screen, which will take you to the Welcome page. Then click on \"Sign up Now\" on the right side of the page.     You will be asked to enter the access code listed below, as well " as some personal information. Please follow the directions to create your username and password.     Your access code is: [email or web address]  Expires: 2018  8:30 AM     Your access code will  in 90 days. If you need help or a new code, please call your [place] clinic or [phone].        Care EveryWhere ID     This is your Care EveryWhere ID. This could be used by other organizations to access your [place] medical records  [id number]        Your Vitals Were     Height BMI (Body Mass Index)                6' (1.829 m) 34.58 kg/m2           Blood Pressure from Last 3 Encounters:   18 124/80   17 139/90   17 142/90    Weight from Last 3 Encounters:   18 255 lb (115.7 kg)   17 255 lb (115.7 kg)   17 242 lb (109.8 kg)                 Today's Medication Changes          These changes are accurate as of 3/9/18  1:26 PM.  If you have any questions, ask your nurse or doctor.               Start taking these medicines.        Dose/Directions    methylPREDNISolone 4 MG tablet   Commonly known as:  MEDROL DOSEPAK   Used for:  Lumbar radicular pain   Started by:  Madelyn Hoffmann APRN CNP        Follow package instructions   Quantity:  21 tablet   Refills:  0            Where to get your medicines      These medications were sent to [facility] [address] [address] [employer] [address] [facility]  [address] [address] [address]     Phone:  [phone]     methylPREDNISolone 4 MG tablet                Primary Care Provider Office Phone # Fax #    Buddy Lior Gleason PA-C [phone] [address] [facility] [address]        Equal Access to Services     SANDRA JIMÉNEZ : Hadbonny Waldrop, wacandida lucholoadaha, qaybta kaalmada moira, kiara ulloa. So [facility] [phone].    ATENCIÓN: Si habla español, tiene a love disposición servicios gratuitos de asistencia lingüística. Llame al [phone].    We comply with  applicable federal civil rights laws and [place] laws. We do not discriminate on the basis of race, color, national origin, age, disability, sex, sexual orientation, or gender identity.            Thank you!     Thank you for choosing [facility]  for your care. Our goal is always to provide you with excellent care. Hearing back from our patients is one way we can continue to improve our services. Please take a few minutes to complete the written survey that you may receive in the mail after your visit with us. Thank you!             Your Updated Medication List - Protect others around you: Learn how to safely use, store and throw away your medicines at www.disposemymeds.org.          This list is accurate as of 3/9/18  1:26 PM.  Always use your most recent med list.                   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

## 2018-03-12 ENCOUNTER — TELEPHONE (OUTPATIENT)
Dept: NEUROSURGERY | Facility: CLINIC | Age: 43
End: 2018-03-12

## 2018-03-12 DIAGNOSIS — M54.16 LUMBAR RADICULOPATHY: Primary | ICD-10-CM

## 2018-03-12 NOTE — TELEPHONE ENCOUNTER
MRI reviewed by Madelyn Hoffmann NP.  She recommends a right L4-5 Epidural steroid injection.  She is also recommending that Kodak schedule a clinic visit with Dr. Segura to discuss surgical options.  Kodak [place].  He was added to Dr. Segura' schedule on 3/20 at our [facility] location.  Orders will be placed for [facility].  Kodak has no further questions at this time.

## 2018-03-13 ENCOUNTER — TELEPHONE (OUTPATIENT)
Dept: PALLIATIVE MEDICINE | Facility: CLINIC | Age: 43
End: 2018-03-13

## 2018-03-13 ENCOUNTER — TELEPHONE (OUTPATIENT)
Dept: NEUROSURGERY | Facility: CLINIC | Age: 43
End: 2018-03-13

## 2018-03-13 NOTE — TELEPHONE ENCOUNTER
Pre-screening Questions for Radiology Injections:     Injection to be done at which interventional clinic site? [facility]     Procedure ordered by SUSAN GAMEZ     Procedure ordered? Lumbar Epidural Steroid Injection     What insurance would patient like us to bill for this procedure? BLUE PLUS       Worker's comp or MVA (motor vehicle accident) -Any injection DO NOT SCHEDULE and route to Taylor Villalba.       [employer] insurance - For SI joint injections, DO NOT SCHEDULE and route Astrid Perkins.       [employer]- MBB's must be scheduled at LEAST two weeks apart       [employer] - Any injection besides hip/shoulder/knee joint DO NOT SCHEDULE and route to Astrid Perkins. She will obtain PA and call pt back to schedule procedure or notify pt of denial.        HP [employer]-PA REQUIRED FOR NON-MATEUSZ OR Joint injections     Any chance of pregnancy? Not Applicable   If YES, do NOT schedule and route to RN pool     Is an  needed? No     Patient has a drive home? (mandatory) YES:      Is patient taking any blood thinners (plavix, coumadin, jantoven, warfarin, heparin, pradaxa or dabigatran )? No   If hold needed, do NOT schedule, route to RN pool     Is patient taking any aspirin products? No     If more than 325mg/day do NOT schedule; route to RN pool     For CERVICAL procedures, hold all aspirin products for 6 days.      Does the patient have a bleeding or clotting disorder? No     If YES, okay to schedule AND route to RN nurse pool    **For any patients with platelet count <100, must be forwarded to provider**     Is patient diabetic?  No  If YES, have them bring their glucometer.     Does patient have an active infection or treated for one within the past week? No     Is patient currently taking any antibiotics?  No     For patients on chronic, preventative, or prophylactic antibiotics, procedures may be scheduled.     For patients on antibiotics for active or recent infection:    Suzan Spencer,  Juan Carlos Gonzalez Snitzer-antibiotic course must have been completed for 4 days    Dr. Bonilla-antibiotic course must have been completed for 7 days     Is patient currently taking any steroid medications? (i.e. Prednisone, Medrol)  No     For patients on steroid medications:    Carlos Jackson Burton, Snitzer-steroid course must have been completed for 4 days    -steroid course must have been completed for 7 days     Reviewed with patient:  If you are started on any steroids or antibiotics between now and your appointment, you must contact us because it may affect our ability to perform your procedure.  Yes     Is patient actively being treated for cancer or immunocompromised? No  If YES, do NOT schedule and route to RN pool      Are you able to get on and off an exam table with minimal or no assistance? Yes  If NO, do NOT schedule and route to RN pool     Are you able to roll over and lay on your stomach with minimal or no assistance? Yes  If NO, do NOT schedule and route to RN pool     Any allergies to contrast dye, iodine, shellfish, or numbing and steroid medications? No  If YES, route to RN pool AND add allergy information to appointment notes     Allergies: No known drug allergies      Has the patient had a flu shot or any other vaccinations within 7 days before or after the procedure.  No     Does patient have an MRI/CT?  YES:   (SI joint, hip injections, lumbar sympathetic blocks, and stellate ganglion blocks do not require an MRI)    Was the MRI done w/in the last 3 years?  Yes    Was MRI done at [facility]? Yes      If not, where was it done? N/A        If MRI was not done at [facility], [facility] or [facility] do NOT schedule and route to nursing.  If pt has an imaging disc, the injection may be scheduled but pt has to bring disc to appt. If they show up w/out disc the injection cannot be done     Reminders (please tell patient if applicable):       Instructed pt to arrive 30 minutes early for IV  start if this is for a cervical procedure, ALL sympathetic (stellate ganglion, hypogastric, or lumbar sympathetic block) and all sedation procedures (RFA, spinal cord stimulation trials).  Not Applicable   -IVs are not routinely placed for Dr. Rangel cervical cases   -Dr. Melo: IVs for cervical ESIs and cervical TBDs (not CMBBs/facet inj)       If NPO for sedation, informed patient that it is okay to take medications with sips of water (except if they are to hold blood thinners).  Not Applicable                        *DO take blood pressure medication if it is prescribed*       If this is for a cervical MATEUSZ, informed patient that aspirin needs to be held for 6 days.   Not Applicable       For all patients not having spinal cord stimulator (SCS) trials or radiofrequency ablations (RFAs), informed patient:     IV sedation is not provided for this procedure.  If you feel that an oral anti-anxiety medication is needed, you can discuss this further with your referring provider or primary care provider.  The Pain Clinic provider will discuss specifics of what the procedure includes at your appointment.  Most procedures last 10-20 minutes.  We use numbing medications to help with any discomfort during the procedure.  Not Applicable       Do not schedule procedures requiring IV placement in the first appointment of the day or first appointment after lunch.        For patients 85 or older we recommend having an adult stay w/ them for the remainder of the day.        Does the patient have any questions?  no    *pt will call back to schedule LESI*

## 2018-03-13 NOTE — TELEPHONE ENCOUNTER
REASON FOR CALL:  Pt requesting change to inj order. States the soonest he is able to get injection scheduled with [facility] is next week due to the nature of the order. Was told by procedural scheduling that order currently only allows him to be seen by Dr. Melo but if the order is changed, he was told he could be scheduled for injection some time this week.     Detailed message can be left:  YES

## 2018-03-14 NOTE — PROGRESS NOTES
[facility] - Procedure Note    Date of Service: 3/15/2018    Procedure performed: Right L5-S1 transforaminal epidural steroid injection with fluoroscopic guidance  Diagnosis: Lumbar spondylosis; Lumbar radiculitis/radiculopathy  : Leslie Rangel MD & Harrison Conner DO (pain fellow)   Anesthesia: none    Indications: Kodak Crowe is a 42 year old male who is seen at the request of Madelyn Hoffmann CNP for lumbar transforaminal epidural steroid injection. The patient describes pain in his right back radiating into the back of his right leg to the foot. He has a positive SLR on the right, strength is 5/5 throughout and symmetric in the lower extremities. The patient has been exhibiting symptoms consistent with lumbar intraspinal inflammation and radiculopathy. Symptoms have been persistent, disabling, and intermittently severe. The patient reports minimal improvement with conservative treatment, including oral medications and therapy.    Previous Right TRANSFORAMINAL EPIDURAL STEROID INJECTION L4-5 on 12/19/2017 was not helpful for his pain.      The patient had a L5-S1 ILESI on 1/16/2018 which gave him 6 weeks of pain relief.       Lumbar MRI was done on 3/09/2018 which showed   FINDINGS: The report is dictated assuming five lumbar-type vertebral  bodies, and radiographic correlation may be necessary. The distal  spinal cord and cauda equina appear normal. Prominent Schmorl's node  in the inferior endplate of L2 without bony reactive marrow changes.  There are degenerative bone marrow signal changes throughout the  lumbar spine adjacent to the endplates. There is a small Schmorl's  node in the superior endplate of L5 and S1.     T12-L1: Normal disc, facet joints, spinal canal and neural foramina.      L1-L2: Normal disc, facet joints, spinal canal and neural foramina.      L2-L3: Minimal annular bulge. No focal disc protrusion. No central or  lateral stenosis.     L3-L4: Mild diffuse annular  bulge with minimal central stenosis. No  significant foraminal stenosis and no interval change.     L4-L5: Mild disc space narrowing. Small broad-based right central disc  protrusion now with a small extruded component on the right caudally  displacing the right L5 nerve root posteriorly. This disc extrusion  has increased in size in the interval. Moderate central stenosis. Mild  foraminal stenosis on the left. Moderate bilateral facet joint  disease.     L5-S1: No disc protrusion. No central or lateral stenosis. Mild facet  joint disease. No interval change.     Paraspinous soft tissues: Normal.      Bone marrow: Degenerative changes. No malignant or destructive  lesions.       IMPRESSION:   1. Degenerative change as described.  2. Right central disc protrusion at L4-L5 with new right-sided  caudally extruded component displacing the right L5 nerve root  posteriorly. This pathologic process has increased since 12/11/2017.       Allergies:      Allergies   Allergen Reactions     No Known Drug Allergies         Vitals:  /85  Pulse 78  SpO2 98%    Review of Systems: The patient denies recent fever, chills, illness, use of antibiotics or anticoagulants. All other 10-point review of systems negative.     Procedure: The procedure and risks were explained, and informed written consent was obtained from the patient. Risks include but are not limited to: infection, bleeding, increased pain, and damage to soft tissue, nerve, muscle, and vasculature structures. After getting informed consent, patient was brought into the procedure suite and was placed in a prone position on the procedure table. A Pause for the Cause was performed. Patient was prepped and draped in sterile fashion.     After identifying the right L5-S1 neuroforamen, the C-arm was rotated to a right lateral oblique angle.  A total of 4.5 mL of Lidocaine 1% was used to anesthetize the skin and the needle track at a skin entry site coaxial with the  fluoroscopy beam, and overriding the superior aspect of the neuroforamen.  A 22 gauge 5 inch spinal needle was advanced under intermittent fluoroscopy until it entered the foramen superiorly.    The position was then inspected from anteroposterior and lateral views, and the needle adjusted appropriately.  After negative aspiration, a total of 1 mL of Omnipaque-300 was injected using static and continuous fluoroscopy confirming appropriate position, with spread along the nerve root sheath and into the epidural space, with no intravascular or intrathecal uptake. 9 mL of Omnipaque-300 was wasted.    1mL of 1% lidocaine with 20 mg of dexamethasone was injected.  The needle was removed. Hemostasis was achieved, the area was cleaned, and bandaids were placed when appropriate. Images were saved to PACS.    The patient tolerated the procedure well, and was taken to the recovery room, and there was no evidence of procedural complications. No new sensory or motor deficits were noted following the procedure. The patient was stable and able to ambulate on discharge home. Post-procedure instructions were provided.     Pre-procedure pain score: 9/10 in the back, 10/10 in the leg  Post-procedure pain score: 3/10 in the back, 5/10 in the leg    Assessment/Plan: Kodak Crowe is a 42 year old male s/p Right L5-S1 transforaminal epidural steroid injection today for lumbar spondylosis, radiculitis/radiculopathy.     1. Following today's procedure, the patient was advised to contact the [facility] for any of the following:   Fever, chills, or night sweats   New onset of pain, numbness, or weakness   Any questions/concerns regarding the procedure  If unable to contact the Pain Center, the patient was instructed to go to a local Emergency Room for any complications.   2. The patient will receive a follow-up call in 1 week.  3. The patient should follow-up with the referring provider in 2 weeks for post-procedure  evaluation.      Leslie Rangel MD   [facility]

## 2018-03-15 ENCOUNTER — RADIOLOGY INJECTION OFFICE VISIT (OUTPATIENT)
Dept: PALLIATIVE MEDICINE | Facility: CLINIC | Age: 43
End: 2018-03-15
Payer: COMMERCIAL

## 2018-03-15 ENCOUNTER — RADIANT APPOINTMENT (OUTPATIENT)
Dept: GENERAL RADIOLOGY | Facility: CLINIC | Age: 43
End: 2018-03-15
Attending: ANESTHESIOLOGY
Payer: COMMERCIAL

## 2018-03-15 VITALS — OXYGEN SATURATION: 98 % | DIASTOLIC BLOOD PRESSURE: 85 MMHG | SYSTOLIC BLOOD PRESSURE: 138 MMHG | HEART RATE: 78 BPM

## 2018-03-15 DIAGNOSIS — M54.16 LUMBAR RADICULOPATHY: Primary | ICD-10-CM

## 2018-03-15 DIAGNOSIS — M54.16 LUMBAR RADICULOPATHY: ICD-10-CM

## 2018-03-15 PROCEDURE — 64483 NJX AA&/STRD TFRM EPI L/S 1: CPT | Mod: RT | Performed by: ANESTHESIOLOGY

## 2018-03-15 NOTE — NURSING NOTE
Pre-procedure Intake    Have you been fasting? NA    If yes, for how long?     Are you taking a prescribed blood thinner such as coumadin, Plavix, Xarelto?    No    If yes, when did you take your last dose?     Do you take aspirin?  No    If cervical procedure, have you held aspirin for 6 days?   NA    Do you have any allergies to contrast dye, iodine, steroid and/or numbing medications?  NO    Are you currently taking antibiotics or have an active infection?  NO    Have you had a fever/elevated temperature within the past week? NO    Are you currently taking oral steroids? YES: finished steroid yesterday    Do you have a ? Yes       Are you pregnant or breastfeeding?  Not Applicable    Are the vital signs normal?  Yes

## 2018-03-15 NOTE — PATIENT INSTRUCTIONS
[facility] Procedure Discharge Instructions    Today you saw:   Dr. Leslie Rangel      Your procedure:  Epidural steroid injection       Medications used:  Lidocaine (anesthetic) Dexamethasone (steroid)  Omnipaque (contrast)               Be cautious when walking as numbness and/or weakness in the legs may occur up to 6-8 hours after the procedure due to effect of the local anesthetic    Do not drive for 6 hours. The effect of the local anesthetic could slow your reflexes.     Avoid strenuous activity for the first 24 hours. You may resume your regular activities after that.     You may shower, however avoid swimming, tub baths or hot tubs for 24 hours following your procedure    You may have a mild to moderate increase in pain for several days following the injection.      You may use ice packs for 10-15 minutes, 3 to 4 times a day at the injection site for comfort    Do not use heat to painful areas for 6 to 8 hours. This will give the local anesthetic time to wear off and prevent you from accidentally burning your skin.    You may use anti-inflammatory medications (such as Ibuprofen/Advil or Aleve) or Tylenol for pain control if necessary    With diabetes, check your blood sugar more frequently than usual as your blood sugar may be higher than normal for 10-14 days following a steroid injection. Contact your doctor who manages your diabetes if your blood sugar is higher than usual    It may take up to 14 days for the steroid medication to start working although you may feel the effect as early as a few days after the procedure.     Follow up with your referring provider in 2-3 weeks      If you experience any of the following, call the pain center line during work hours at [phone] or on-call physician after hours at [phone]:  -Fever over 100 degree F  -Swelling, bleeding, redness, drainage, warmth at the injection site  -Progressive weakness or numbness in your legs or arms  -Loss of bowel or  bladder function  -Unusual headache that is not relieved by Tylenol or your regular headache medication  -Unusual new onset of pain that is not improving    Phone #s:  Nurse triage line for general questions: [phone]

## 2018-03-15 NOTE — MR AVS SNAPSHOT
After Visit Summary   3/15/2018    Kodak Crowe    MRN: [id number]           Patient Information     Date Of Birth          1975        Visit Information        Provider Department      3/15/2018 8:15 AM Leslie Rangel MD [facility]        Care Instructions    [facility] Procedure Discharge Instructions    Today you saw:   Dr. Leslie Rangel      Your procedure:  Epidural steroid injection       Medications used:  Lidocaine (anesthetic) Dexamethasone (steroid)  Omnipaque (contrast)               Be cautious when walking as numbness and/or weakness in the legs may occur up to 6-8 hours after the procedure due to effect of the local anesthetic    Do not drive for 6 hours. The effect of the local anesthetic could slow your reflexes.     Avoid strenuous activity for the first 24 hours. You may resume your regular activities after that.     You may shower, however avoid swimming, tub baths or hot tubs for 24 hours following your procedure    You may have a mild to moderate increase in pain for several days following the injection.      You may use ice packs for 10-15 minutes, 3 to 4 times a day at the injection site for comfort    Do not use heat to painful areas for 6 to 8 hours. This will give the local anesthetic time to wear off and prevent you from accidentally burning your skin.    You may use anti-inflammatory medications (such as Ibuprofen/Advil or Aleve) or Tylenol for pain control if necessary    With diabetes, check your blood sugar more frequently than usual as your blood sugar may be higher than normal for 10-14 days following a steroid injection. Contact your doctor who manages your diabetes if your blood sugar is higher than usual    It may take up to 14 days for the steroid medication to start working although you may feel the effect as early as a few days after the procedure.     Follow up with your referring provider in 2-3 weeks      If you experience any of  "the following, call the pain center line during work hours at [phone] or on-call physician after hours at [phone]:  -Fever over 100 degree F  -Swelling, bleeding, redness, drainage, warmth at the injection site  -Progressive weakness or numbness in your legs or arms  -Loss of bowel or bladder function  -Unusual headache that is not relieved by Tylenol or your regular headache medication  -Unusual new onset of pain that is not improving    Phone #s:  Nurse triage line for general questions: [phone]            Follow-ups after your visit        Your next 10 appointments already scheduled     Mar 20, 2018  8:40 AM CDT   Return Visit with Suhail Segura MD   [facility] ([facility])    [address] [place] [id number] [phone]              Who to contact     If you have questions or need follow up information about today's clinic visit or your schedule please contact [facility] directly at [phone].  Normal or non-critical lab and imaging results will be communicated to you by [employer], letter or phone within 4 business days after the clinic has received the results. If you do not hear from us within 7 days, please contact the clinic through [employer] or phone. If you have a critical or abnormal lab result, we will notify you by phone as soon as possible.  Submit refill requests through [facility] or call your pharmacy and they will forward the refill request to us. Please allow 3 business days for your refill to be completed.          Additional Information About Your Visit        [employer] Information     [facility] lets you send messages to your doctor, view your test results, renew your prescriptions, schedule appointments and more. To sign up, go to [email or web address] . Click on \"Log in\" on the left side of the screen, which will take you to the Welcome page. Then click on \"Sign up Now\" on the right " side of the page.     You will be asked to enter the access code listed below, as well as some personal information. Please follow the directions to create your username and password.     Your access code is: [email or web address]  Expires: 2018  9:30 AM     Your access code will  in 90 days. If you need help or a new code, please call your [place] clinic or [phone].        Care EveryWhere ID     This is your Care EveryWhere ID. This could be used by other organizations to access your [place] medical records  [id number]        Your Vitals Were     Pulse Pulse Oximetry                67 99%           Blood Pressure from Last 3 Encounters:   03/15/18 132/82   18 (!) 161/96   18 124/80    Weight from Last 3 Encounters:   18 115.7 kg (255 lb)   17 115.7 kg (255 lb)   17 109.8 kg (242 lb)              Today, you had the following     No orders found for display       Primary Care Provider Office Phone # Fax #    Buddy Lior Gleason PA-C [phone] [address] [facility] [address]        Equal Access to Services     VITOR JIMÉNEZ : Hadii chandni yaneso Sotoñaali, waaxda luqadaha, qaybta kaalmada adeegyada, kiara rodriguez . So [facility] [phone].    ATENCIÓN: Si habla español, tiene a love disposición servicios gratuitos de asistencia lingüística. Llame al [phone].    We comply with applicable federal civil rights laws and [place] laws. We do not discriminate on the basis of race, color, national origin, age, disability, sex, sexual orientation, or gender identity.            Thank you!     Thank you for choosing [facility]  for your care. Our goal is always to provide you with excellent care. Hearing back from our patients is one way we can continue to improve our services. Please take a few minutes to complete the written survey that you may receive in the mail after your visit with us. Thank you!              Your Updated Medication List - Protect others around you: Learn how to safely use, store and throw away your medicines at www.disposemymeds.org.          This list is accurate as of 3/15/18  8:51 AM.  Always use your most recent med list.                   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

## 2018-03-15 NOTE — NURSING NOTE
Discharge Information    IV Discontiued Time:  NA    Amount of Fluid Infused:  NA    Discharge Criteria = When patient returns to baseline or as per MD order    Consciousness:  Pt is fully awake    Circulation:  BP +/- 20% of pre-procedure level    Respiration:  Patient is able to breathe deeply    O2 Sat:  Patient is able to maintain O2 Sat >92% on room air    Activity:  Moves 4 extremities on command    Ambulation:  Patient is able to stand and walk or stand and pivot into wheelchair    Dressing:  Clean/dry or No Dressing    Notes:   Discharge instructions and AVS given to patient    Patient meets criteria for discharge?  YES    Admitted to PCU?  No    Responsible adult present to accompany patient home?  Yes    Signature/Title:    Monisha Reeves  RN Care Coordinator  [facility]

## 2018-03-20 ENCOUNTER — TELEPHONE (OUTPATIENT)
Dept: NEUROSURGERY | Facility: CLINIC | Age: 43
End: 2018-03-20

## 2018-03-20 ENCOUNTER — OFFICE VISIT (OUTPATIENT)
Dept: NEUROSURGERY | Facility: CLINIC | Age: 43
End: 2018-03-20
Attending: NEUROLOGICAL SURGERY
Payer: COMMERCIAL

## 2018-03-20 VITALS
HEART RATE: 82 BPM | OXYGEN SATURATION: 100 % | WEIGHT: 249.2 LBS | SYSTOLIC BLOOD PRESSURE: 154 MMHG | BODY MASS INDEX: 33.03 KG/M2 | HEIGHT: 73 IN | DIASTOLIC BLOOD PRESSURE: 102 MMHG

## 2018-03-20 DIAGNOSIS — M54.16 LUMBAR RADICULOPATHY: Primary | ICD-10-CM

## 2018-03-20 DIAGNOSIS — M51.379 DEGENERATION OF LUMBAR OR LUMBOSACRAL INTERVERTEBRAL DISC: ICD-10-CM

## 2018-03-20 PROCEDURE — G0463 HOSPITAL OUTPT CLINIC VISIT: HCPCS | Performed by: NURSE PRACTITIONER

## 2018-03-20 PROCEDURE — 99213 OFFICE O/P EST LOW 20 MIN: CPT | Performed by: NEUROLOGICAL SURGERY

## 2018-03-20 RX ORDER — HYDROMORPHONE HYDROCHLORIDE 2 MG/1
2-4 TABLET ORAL EVERY 4 HOURS PRN
Qty: 30 TABLET | Refills: 0 | Status: ON HOLD | OUTPATIENT
Start: 2018-03-20 | End: 2018-04-02

## 2018-03-20 ASSESSMENT — PAIN SCALES - GENERAL: PAINLEVEL: EXTREME PAIN (8)

## 2018-03-20 NOTE — TELEPHONE ENCOUNTER
Type of surgery: Right L4-5 Microdiscectomy  Location of surgery: [facility]  Date and time of surgery: 04/02/2018 at 9:30am  Surgeon: Dr. Segura  Pre-Op Appt Date: 03/21/2018  Post-Op Appt Date: 05/16/2018   Packet sent out: Yes  Pre-cert/Authorization completed:  Yes  Date: CHIQUIS Cruz on 3/20/2018 at 10:46 AM

## 2018-03-20 NOTE — MR AVS SNAPSHOT
After Visit Summary   3/20/2018    Kodak Crowe    MRN: [id number]           Patient Information     Date Of Birth          1975        Visit Information        Provider Department      3/20/2018 8:40 AM Suhail Segura MD [facility]        Care Instructions    Patient Instructions  Pre-Operative:  -Surgery scheduled at [facility] [place] for right L4-5 Microdiscectomy   -Surgical risks: blood clots in the leg or lung, problems urinating, nerve damage, drainage from the incision, infection,stiffness  - Pre-operative physical with primary care physician within 30 days of surgical date.   - Likely same day procedure with discharge home day of surgery, may stay for 23 hour observation hospitalization for monitoring.     -Shower procedure: please shower with antimicrobial soap the night before surgery and morning of surgery. Please refer to showering instruction sheet in folder.  -Stop all solid foods 8 hours before surgery.  -Keep drinking clear liquids until 4 hours before surgery. Clear liquids include water, clear juice, black coffee, or clear tea without milk, Gatorade, clear soda.   - Discontinue Aspirin, NSAIDs (Advil/Ibuprofen, Naproxen,Nuprin,Relafen/Nabumetone, Diclofenac,Meloxicam, Aleve, Celebrex) x 7 days prior to surgical date.  - May try tylenol for pain 1000 mg three times per day for pain      Post-Operative:  -you may resume taking NSAIDs 7 days post op   - Post operative incisional pain x 1-2 weeks which will require pain medications and muscle relaxants. You will receive medication upon discharge.  -Do NOT drive while taking narcotic pain medication.  -Do not drink alcohol while using any pain medication  -Post operative incision care- Watch for signs of infection: redness, swelling, warmth, drainage, and fever of 101 degrees or higher. Notify clinic [phone].  -No submerging incision in water such as pools, hot tubs,baths for at  "least 8 weeks or until incision is healed. Showers are fine.   - Post operative activity limitations: 6-8 weeks, no lifting > 10 pounds, no bending, twisting, or overhead reaching.  - Post op follow up appointments:6 week post op follow up visit with Nurse practitioner.Please call to schedule follow up appointment at [phone].  -If you are currently employed, you will need to be off work for 2-4 weeks for post op recovery and healing.               Follow-ups after your visit        Who to contact     If you have questions or need follow up information about today's clinic visit or your schedule please contact [facility] directly at [phone].  Normal or non-critical lab and imaging results will be communicated to you by [employer], letter or phone within 4 business days after the clinic has received the results. If you do not hear from us within 7 days, please contact the clinic through [employer] or phone. If you have a critical or abnormal lab result, we will notify you by phone as soon as possible.  Submit refill requests through [email or web address] or call your pharmacy and they will forward the refill request to us. Please allow 3 business days for your refill to be completed.          Additional Information About Your Visit        [email or web address] Information     [email or web address] lets you send messages to your doctor, view your test results, renew your prescriptions, schedule appointments and more. To sign up, go to [email or web address] . Click on \"Log in\" on the left side of the screen, which will take you to the Welcome page. Then click on \"Sign up Now\" on the right side of the page.     You will be asked to enter the access code listed below, as well as some personal information. Please follow the directions to create your username and password.     Your access code is: [email or web address]  Expires: 2018  9:30 AM     Your access code will  in 90 days. If you need help or a new code, please call your " "[facility] or [phone].        Care EveryWhere ID     This is your Care EveryWhere ID. This could be used by other organizations to access your [place] medical records  [id number]        Your Vitals Were     Pulse Height Pulse Oximetry BMI (Body Mass Index)          82 6' 0.64\" (1.845 m) 100% 33.21 kg/m2         Blood Pressure from Last 3 Encounters:   03/20/18 (!) 154/102   03/15/18 138/85   03/09/18 (!) 161/96    Weight from Last 3 Encounters:   03/20/18 249 lb 3.2 oz (113 kg)   03/09/18 255 lb (115.7 kg)   12/06/17 255 lb (115.7 kg)              Today, you had the following     No orders found for display       Primary Care Provider Office Phone # Fax #    Buddy Lior Gleason PA-C [phone] [address] [facility] [address]        Equal Access to Services     VITOR JIMÉNEZ : Hadii aad ku hadasho Soomaali, waaxda luqadaha, qaybta kaalmada adeegyada, waxay idiin hayarelin shyann rodriguez . So [facility] [phone].    ATENCIÓN: Si habla español, tiene a love disposición servicios gratuitos de asistencia lingüística. Llame al [phone].    We comply with applicable federal civil rights laws and [place] laws. We do not discriminate on the basis of race, color, national origin, age, disability, sex, sexual orientation, or gender identity.            Thank you!     Thank you for choosing [facility]  for your care. Our goal is always to provide you with excellent care. Hearing back from our patients is one way we can continue to improve our services. Please take a few minutes to complete the written survey that you may receive in the mail after your visit with us. Thank you!             Your Updated Medication List - Protect others around you: Learn how to safely use, store and throw away your medicines at www.disposemymeds.org.          This list is accurate as of 3/20/18  8:54 AM.  Always use your most recent med list.                   Brand Name " Dispense Instructions for use Diagnosis    levothyroxine 125 MCG tablet    SYNTHROID    90 tablet    Take 1 tablet (125 mcg) by mouth daily    Congenital hypothyroidism without goiter       methylPREDNISolone 4 MG tablet    MEDROL DOSEPAK    21 tablet    Follow package instructions    Lumbar radicular pain

## 2018-03-20 NOTE — NURSING NOTE
"Kodak Crowe is a 42 year old male who presents for:  Chief Complaint   Patient presents with     Neurologic Problem     Follow up lumbar radiculopathy discuss surgery, started having low back pain in september and developed knee pain while doing PT he continued to have leg pain         Initial Vitals:  BP (!) 154/102 (BP Location: Left arm, Patient Position: Sitting, Cuff Size: Adult Large)  Pulse 82  Ht 6' 0.64\" (1.845 m)  Wt 249 lb 3.2 oz (113 kg)  SpO2 100%  BMI 33.21 kg/m2 Estimated body mass index is 33.21 kg/(m^2) as calculated from the following:    Height as of this encounter: 6' 0.64\" (1.845 m).    Weight as of this encounter: 249 lb 3.2 oz (113 kg).. Body surface area is 2.41 meters squared. BP completed using cuff size: large  Extreme Pain (8)    Do you feel safe in your environment?  Yes  Do you need any refills today? No    Nursing Comments: Follow up lumbar radiculopathy discuss surgery, started having low back pain in september and developed knee pain while doing PT he continued to have leg pain.        5 min. nursing intake time  Lorna Shankar MA       Discharge plan: Patient Instructions  Pre-Operative:  -Surgery scheduled at [facility] for right L4-5 Microdiscectomy   -Surgical risks: blood clots in the leg or lung, problems urinating, nerve damage, drainage from the incision, infection,stiffness  - Pre-operative physical with primary care physician within 30 days of surgical date.   - Likely same day procedure with discharge home day of surgery, may stay for 23 hour observation hospitalization for monitoring.     -Shower procedure: please shower with antimicrobial soap the night before surgery and morning of surgery. Please refer to showering instruction sheet in folder.  -Stop all solid foods 8 hours before surgery.  -Keep drinking clear liquids until 4 hours before surgery. Clear liquids include water, clear juice, black coffee, or clear tea without milk, Gatorade, clear " soda.   - Discontinue Aspirin, NSAIDs (Advil/Ibuprofen, Naproxen,Nuprin,Relafen/Nabumetone, Diclofenac,Meloxicam, Aleve, Celebrex) x 7 days prior to surgical date.  - May try tylenol for pain 1000 mg three times per day for pain      Post-Operative:  -you may resume taking NSAIDs 7 days post op   - Post operative incisional pain x 1-2 weeks which will require pain medications and muscle relaxants. You will receive medication upon discharge.  -Do NOT drive while taking narcotic pain medication.  -Do not drink alcohol while using any pain medication  -Post operative incision care- Watch for signs of infection: redness, swelling, warmth, drainage, and fever of 101 degrees or higher. Notify clinic [phone].  -No submerging incision in water such as pools, hot tubs,baths for at least 8 weeks or until incision is healed. Showers are fine.   - Post operative activity limitations: 6-8 weeks, no lifting > 10 pounds, no bending, twisting, or overhead reaching.  - Post op follow up appointments:6 week post op follow up visit with Nurse practitioner.Please call to schedule follow up appointment at [phone].  -If you are currently employed, you will need to be off work for 2-4 weeks for post op recovery and healing.     2 min. nursing discharge time  Lorna Shankar MA

## 2018-03-20 NOTE — NURSING NOTE
Pre-operative Education    Education included but not limited to:  - Pre-operative physical with primary care physician within 30 days of surgical date. Pre op to be done at [facility].   - Pre-operative clearance (cardiology, hematology, etc).   - Discontinue Aspirin, NSAIDs, Diclofenac x 7 days prior to surgical date.   -May try tylenol for pain 1000 mg three times per day for pain  -you may resume taking NSAIDs 7 days post op  -Patient is not a smoker  -Forms to be completed: 4 weeks fmla   -Surgical risks: blood clots in the leg or lung, problems urinating, nerve damage, drainage from the incision, infection,stiffness  -Preparation timeline  - When to start NPO           -Special bathing procedure.Patient received Hibiclens and showering instruction sheet.   Hospital stay:  Checking in  The surgery itself   Recovery room  - Managing pain   - Likely same day procedure with discharge home day of surgery, may stay for 23 hour observation hospitalization for monitoring  - Post operative incisional pain x 1-2 weeks which will require pain medications and muscle relaxants.   -Do NOT drive or drink alcohol while taking narcotic pain medication.  -Post operative incision care-Keep your incision clean and dry at all times. OK to remove dressing on postop day 2. OK to shower on postop day 3 and allow water to run over incision, pat dry after shower. No bathing, swimming or submerging in water. Call immediately or come to ED if any drainage occurs, or you develop new pain. Watch for signs of infection: redness, swelling, warmth, drainage, and fever of 101 degrees or higher. Notify clinic.   - Post operative activity limitations: 6-8 weeks, no lifting > 10 pounds, no bending, twisting, or overhead reaching.  - Post op follow up appointments: 6 weeks with Nurse Practitioner. Please call to schedule follow up appointment at [phone].   - Spine Education book was also given to the patient for further review.       Patient verbalized understanding of above instructions. All questions were answered to the best of my ability and the patient's satisfaction. Patient advised to call with any additional questions or concerns.  Laura Hairston RN

## 2018-03-20 NOTE — PATIENT INSTRUCTIONS
Patient Instructions  Pre-Operative:  -Surgery scheduled at [facility] for right L4-5 Microdiscectomy   -Surgical risks: blood clots in the leg or lung, problems urinating, nerve damage, drainage from the incision, infection,stiffness  - Pre-operative physical with primary care physician within 30 days of surgical date.   - Likely same day procedure with discharge home day of surgery, may stay for 23 hour observation hospitalization for monitoring.     -Shower procedure: please shower with antimicrobial soap the night before surgery and morning of surgery. Please refer to showering instruction sheet in folder.  -Stop all solid foods 8 hours before surgery.  -Keep drinking clear liquids until 4 hours before surgery. Clear liquids include water, clear juice, black coffee, or clear tea without milk, Gatorade, clear soda.   - Discontinue Aspirin, NSAIDs (Advil/Ibuprofen, Naproxen,Nuprin,Relafen/Nabumetone, Diclofenac,Meloxicam, Aleve, Celebrex) x 7 days prior to surgical date.  - May try tylenol for pain 1000 mg three times per day for pain      Post-Operative:  -you may resume taking NSAIDs 7 days post op   - Post operative incisional pain x 1-2 weeks which will require pain medications and muscle relaxants. You will receive medication upon discharge.  -Do NOT drive while taking narcotic pain medication.  -Do not drink alcohol while using any pain medication  -Post operative incision care- Watch for signs of infection: redness, swelling, warmth, drainage, and fever of 101 degrees or higher. Notify clinic [phone].  -No submerging incision in water such as pools, hot tubs,baths for at least 8 weeks or until incision is healed. Showers are fine.   - Post operative activity limitations: 6-8 weeks, no lifting > 10 pounds, no bending, twisting, or overhead reaching.  - Post op follow up appointments:6 week post op follow up visit with Nurse practitioner.Please call to schedule follow up appointment at  [phone].  -If you are currently employed, you will need to be off work for 2-4 weeks for post op recovery and healing.

## 2018-03-20 NOTE — LETTER
3/20/2018         RE: Kodak Crowe  [address]  [place] [address]        Dear Colleague,    Thank you for referring your patient, Kodak Crowe, to the [facility] NEUROSURGERY [facility]INIC. Please see a copy of my visit note below.    Neurosurgery Follow Up Clinic Visit      Kodak Crowe returns for follow up visit regarding his right L5 radicular pain    Currently the patient describes having right L5 radicular pain. The patient has failed PT and multiple MATEUSZ. He says the pain is affecting his AODL.    Exam   Right dorsiflexion 4/5  Positive SLR on the right    MRI lumbar - L3-5 DDD and a right L4-5 herniated disk with compression of the right L5 root      Plan:   I have recommended a right L4-5 microdiscectomy. I discussed with the patient the risk of surgery to include, but, not be limited to; nerve injury, failure of improvement of symptoms, CSF leak, infection, post op hematoma, the need for recurrent surgery, paralysis, coma and death.    Will also prescribe Dilaudid               Again, thank you for allowing me to participate in the care of your patient.        Sincerely,        Suhail Segura MD

## 2018-03-21 ENCOUNTER — OFFICE VISIT (OUTPATIENT)
Dept: FAMILY MEDICINE | Facility: CLINIC | Age: 43
End: 2018-03-21
Payer: COMMERCIAL

## 2018-03-21 VITALS
SYSTOLIC BLOOD PRESSURE: 136 MMHG | HEIGHT: 72 IN | RESPIRATION RATE: 16 BRPM | TEMPERATURE: 97.7 F | DIASTOLIC BLOOD PRESSURE: 86 MMHG | BODY MASS INDEX: 33.32 KG/M2 | OXYGEN SATURATION: 99 % | WEIGHT: 246 LBS | HEART RATE: 69 BPM

## 2018-03-21 DIAGNOSIS — E03.1 CONGENITAL HYPOTHYROIDISM WITHOUT GOITER: ICD-10-CM

## 2018-03-21 DIAGNOSIS — Z01.818 PREOP GENERAL PHYSICAL EXAM: Primary | ICD-10-CM

## 2018-03-21 DIAGNOSIS — M54.16 LUMBAR RADICULOPATHY: ICD-10-CM

## 2018-03-21 LAB
ERYTHROCYTE [DISTWIDTH] IN BLOOD BY AUTOMATED COUNT: 12.3 % (ref 10–15)
HCT VFR BLD AUTO: 46.1 % (ref 40–53)
HGB BLD-MCNC: 16.4 G/DL (ref 13.3–17.7)
MCH RBC QN AUTO: 30.7 PG (ref 26.5–33)
MCHC RBC AUTO-ENTMCNC: 35.6 G/DL (ref 31.5–36.5)
MCV RBC AUTO: 86 FL (ref 78–100)
PLATELET # BLD AUTO: 231 10E9/L (ref 150–450)
RBC # BLD AUTO: 5.34 10E12/L (ref 4.4–5.9)
WBC # BLD AUTO: 9.3 10E9/L (ref 4–11)

## 2018-03-21 PROCEDURE — 85027 COMPLETE CBC AUTOMATED: CPT | Performed by: PHYSICIAN ASSISTANT

## 2018-03-21 PROCEDURE — 99214 OFFICE O/P EST MOD 30 MIN: CPT | Performed by: PHYSICIAN ASSISTANT

## 2018-03-21 PROCEDURE — 36415 COLL VENOUS BLD VENIPUNCTURE: CPT | Performed by: PHYSICIAN ASSISTANT

## 2018-03-21 NOTE — MR AVS SNAPSHOT
After Visit Summary   3/21/2018    Kodak Crowe    MRN: [id number]           Patient Information     Date Of Birth          1975        Visit Information        Provider Department      3/21/2018 8:00 AM Buddy Gleason PA-C [facility]        Today's Diagnoses     Preop general physical exam    -  1    Lumbar radiculopathy        Congenital hypothyroidism without goiter          Care Instructions      Before Your Surgery      Call your surgeon if there is any change in your health. This includes signs of a cold or flu (such as a sore throat, runny nose, cough, rash or fever).    Do not smoke, drink alcohol or take over the counter medicine (unless your surgeon or primary care doctor tells you to) for the 24 hours before and after surgery.    If you take prescribed drugs: Follow your doctor s orders about which medicines to take and which to stop until after surgery.    Eating and drinking prior to surgery: follow the instructions from your surgeon    Take a shower or bath the night before surgery. Use the soap your surgeon gave you to gently clean your skin. If you do not have soap from your surgeon, use your regular soap. Do not shave or scrub the surgery site.  Wear clean pajamas and have clean sheets on your bed.           Follow-ups after your visit        Your next 10 appointments already scheduled     Apr 02, 2018   Procedure with Suhail Segura MD   [facility] PeriOp [facility] (--)    [address] [facility] [address]   [phone]            May 16, 2018  3:00 PM CDT   Return Visit with EUSEBIO Pradhan CNP   [facility] ([facility])    [address] [address] [id number] [phone]              Who to contact     If you have questions or need follow up information about today's clinic visit or your schedule please contact [facility]  "directly at [phone].  Normal or non-critical lab and imaging results will be communicated to you by [employer], letter or phone within 4 business days after the clinic has received the results. If you do not hear from us within 7 days, please contact the clinic through [employer] or phone. If you have a critical or abnormal lab result, we will notify you by phone as soon as possible.  Submit refill requests through [employer] or call your pharmacy and they will forward the refill request to us. Please allow 3 business days for your refill to be completed.          Additional Information About Your Visit        [employer] Information     [employer] lets you send messages to your doctor, view your test results, renew your prescriptions, schedule appointments and more. To sign up, go to [email or web address] . Click on \"Log in\" on the left side of the screen, which will take you to the Welcome page. Then click on \"Sign up Now\" on the right side of the page.     You will be asked to enter the access code listed below, as well as some personal information. Please follow the directions to create your username and password.     Your access code is: [email or web address]  Expires: 2018  9:39 AM     Your access code will  in 90 days. If you need help or a new code, please call your [facility] clinic or [phone].        Care EveryWhere ID     This is your Care EveryWhere ID. This could be used by other organizations to access your [facility] medical records  [id number]        Your Vitals Were     Pulse Temperature Respirations Height Pulse Oximetry BMI (Body Mass Index)    69 97.7  F (36.5  C) (Oral) 16 6' (1.829 m) 99% 33.36 kg/m2       Blood Pressure from Last 3 Encounters:   18 136/86   18 (!) 154/102   03/15/18 138/85    Weight from Last 3 Encounters:   18 246 lb (111.6 kg)   18 249 lb 3.2 oz (113 kg)   18 255 lb (115.7 kg)              We Performed the Following     CBC with platelets        " Primary Care Provider Office Phone # Fax #    Buddy Lior Gleason PA-C [phone] [address] [address]        Equal Access to Services     SANDRA JIMÉNEZ : Joanna goel joaquíno Sotoñaali, waaxda luqadaha, qaybta kaalmada adelexi, kiara espino laArnelkassandra ulloa. So [facility] [phone].    ATENCIÓN: Si habla español, tiene a love disposición servicios gratuitos de asistencia lingüística. Llame al [phone].    We comply with applicable federal civil rights laws and [place] laws. We do not discriminate on the basis of race, color, national origin, age, disability, sex, sexual orientation, or gender identity.            Thank you!     Thank you for choosing [facility]  for your care. Our goal is always to provide you with excellent care. Hearing back from our patients is one way we can continue to improve our services. Please take a few minutes to complete the written survey that you may receive in the mail after your visit with us. Thank you!             Your Updated Medication List - Protect others around you: Learn how to safely use, store and throw away your medicines at www.disposemymeds.org.          This list is accurate as of 3/21/18 12:35 PM.  Always use your most recent med list.                   Brand Name Dispense Instructions for use Diagnosis    HYDROmorphone 2 MG tablet    DILAUDID    30 tablet    Take 1-2 tablets (2-4 mg) by mouth every 4 hours as needed for pain    Lumbar radiculopathy       levothyroxine 125 MCG tablet    SYNTHROID    90 tablet    Take 1 tablet (125 mcg) by mouth daily    Congenital hypothyroidism without goiter       methylPREDNISolone 4 MG tablet    MEDROL DOSEPAK    21 tablet    Follow package instructions    Lumbar radicular pain

## 2018-03-21 NOTE — NURSING NOTE
Chief Complaint   Patient presents with     Pre-Op Exam       Initial /86 (BP Location: Right arm, Patient Position: Chair, Cuff Size: Adult Large)  Pulse 69  Temp 97.7  F (36.5  C) (Oral)  Resp 16  Ht 6' (1.829 m)  Wt 246 lb (111.6 kg)  SpO2 99%  BMI 33.36 kg/m2 Estimated body mass index is 33.36 kg/(m^2) as calculated from the following:    Height as of this encounter: 6' (1.829 m).    Weight as of this encounter: 246 lb (111.6 kg).  Medication Reconciliation: complete

## 2018-03-21 NOTE — PROGRESS NOTES
Neurosurgery Follow Up Clinic Visit      Kodak Crowe returns for follow up visit regarding his right L5 radicular pain    Currently the patient describes having right L5 radicular pain. The patient has failed PT and multiple MATEUSZ. He says the pain is affecting his AODL.    Exam   Right dorsiflexion 4/5  Positive SLR on the right    MRI lumbar - L3-5 DDD and a right L4-5 herniated disk with compression of the right L5 root      Plan:   I have recommended a right L4-5 microdiscectomy. I discussed with the patient the risk of surgery to include, but, not be limited to; nerve injury, failure of improvement of symptoms, CSF leak, infection, post op hematoma, the need for recurrent surgery, paralysis, coma and death.    Will also prescribe Dilaudid

## 2018-03-21 NOTE — PROGRESS NOTES
[facility]  [address] [address]  [phone]  Dept: [phone]    PRE-OP EVALUATION:  Today's date: 3/21/2018    Kodak Crowe (: 1975) presents for pre-operative evaluation assessment as requested by Dr. Segura, Suhail Morales MD .  He requires evaluation and anesthesia risk assessment prior to undergoing surgery/procedure for treatment of  right L4-5 microdiscectomy  .    Fax number for surgical facility:   Primary Physician: Buddy Gleason  Type of Anesthesia Anticipated: General    Patient has a Health Care Directive or Living Will:  NO    Preop Questions 3/21/2018   Who is doing your surgery? dr segura   What are you having done? microdicectomy   Date of Surgery/Procedure: 2018   Facility or Hospital where procedure/surgery will be performed: [facility]   1.  Do you have a history of Heart attack, stroke, stent, coronary bypass surgery, or other heart surgery? No   2.  Do you ever have any pain or discomfort in your chest? No   3.  Do you have a history of  Heart Failure? No   4.   Are you troubled by shortness of breath when:  walking on a level surface, or up a slight hill, or at night? No   5.  Do you currently have a cold, bronchitis or other respiratory infection? No   6.  Do you have a cough, shortness of breath, or wheezing? No   7.  Do you sometimes get pains in the calves of your legs when you walk? No   8. Do you or anyone in your family have previous history of blood clots? No   9.  Do you or does anyone in your family have a serious bleeding problem such as prolonged bleeding following surgeries or cuts? No   10. Have you ever had problems with anemia or been told to take iron pills? No   11. Have you had any abnormal blood loss such as black, tarry or bloody stools? No   12. Have you ever had a blood transfusion? No   13. Have you or any of your relatives ever had problems with anesthesia? No   14. Do  you have sleep apnea, excessive snoring or daytime drowsiness? No   15. Do you have any prosthetic heart valves? No   16. Do you have prosthetic joints? No         HPI:     HPI related to upcoming procedure:       HYPOTHYROIDISM - Patient has a longstanding history of chronic Hypothyroidism. Patient has been doing well, noting no tremor, insomnia, hair loss or changes in skin texture. Last TSH value of   TSH   Date Value Ref Range Status   06/13/2017 1.83 0.40 - 4.00 mU/L Final   ]  . Continues to take medications as directed, without adverse reactions or side effects.                                                                                                                                                                                                                        .    MEDICAL HISTORY:     Patient Active Problem List    Diagnosis Date Noted     Lumbar radiculopathy 10/13/2017     Priority: Medium     Hyperlipidemia with target LDL less than 160 02/10/2014     Priority: Medium     Diagnosis updated by automated process. Provider to review and confirm.       Hypothyroid 08/12/2013     Priority: Medium     Family history of hemochromatosis 04/09/2010     Priority: Medium      Past Medical History:   Diagnosis Date     Tourette's disorder 1986     Past Surgical History:   Procedure Laterality Date     ENT SURGERY       VASECTOMY       Current Outpatient Prescriptions   Medication Sig Dispense Refill     levothyroxine (SYNTHROID) 125 MCG tablet Take 1 tablet (125 mcg) by mouth daily 90 tablet 3     HYDROmorphone (DILAUDID) 2 MG tablet Take 1-2 tablets (2-4 mg) by mouth every 4 hours as needed for pain (Patient not taking: Reported on 3/21/2018) 30 tablet 0     methylPREDNISolone (MEDROL DOSEPAK) 4 MG tablet Follow package instructions (Patient not taking: Reported on 3/21/2018) 21 tablet 0     OTC products: no recent use of OTC ASA, NSAIDS or Steroids    Allergies   Allergen Reactions     No Known Drug  Allergies       Latex Allergy: NO    Social History   Substance Use Topics     Smoking status: Never Smoker     Smokeless tobacco: Never Used     Alcohol use 0.0 oz/week     0 Standard drinks or equivalent per week      Comment: occasional     History   Drug Use No       REVIEW OF SYSTEMS:   CONSTITUTIONAL: NEGATIVE for fever, chills, change in weight  INTEGUMENTARY/SKIN: NEGATIVE for worrisome rashes, moles or lesions  EYES: NEGATIVE for vision changes or irritation  ENT/MOUTH: NEGATIVE for ear, mouth and throat problems  RESP: NEGATIVE for significant cough or SOB  CV: NEGATIVE for chest pain, palpitations or peripheral edema  GI: NEGATIVE for nausea, abdominal pain, heartburn, or change in bowel habits  : NEGATIVE for frequency, dysuria, or hematuria  MUSCULOSKELETAL: ongoing back pain. Otherwise, NEGATIVE for significant arthralgias or myalgia  NEURO: NEGATIVE for weakness, dizziness or paresthesias  ENDOCRINE: NEGATIVE for temperature intolerance, skin/hair changes  HEME: NEGATIVE for bleeding problems  PSYCHIATRIC: NEGATIVE for changes in mood or affect    EXAM:   /86 (BP Location: Right arm, Patient Position: Chair, Cuff Size: Adult Large)  Pulse 69  Temp 97.7  F (36.5  C) (Oral)  Resp 16  Ht 6' (1.829 m)  Wt 246 lb (111.6 kg)  SpO2 99%  BMI 33.36 kg/m2    GENERAL APPEARANCE: alert, active and mild distress     EYES: EOMI,  PERRL     HENT: ear canals and TM's normal and nose and mouth without ulcers or lesions     NECK: no adenopathy, no asymmetry, masses, or scars and thyroid normal to palpation     RESP: lungs clear to auscultation - no rales, rhonchi or wheezes     CV: regular rates and rhythm, normal S1 S2, no S3 or S4 and no murmur, click or rub     ABDOMEN:  soft, nontender, no HSM or masses and bowel sounds normal     MS: extremities normal- no gross deformities noted, no evidence of inflammation in joints, FROM in all extremities.     SKIN: no suspicious lesions or rashes     NEURO:  mentation intact, speech normal and gait abnormal with limp     PSYCH: mentation appears normal. and affect normal/bright     LYMPHATICS: No cervical adenopathy    DIAGNOSTICS:     EKG: Not indicated due to non-vascular surgery and low risk of event (age <65 and without cardiac risk factors)  Labs Resulted Today:   Results for orders placed or performed in visit on 03/21/18   CBC with platelets   Result Value Ref Range    WBC 9.3 4.0 - 11.0 10e9/L    RBC Count 5.34 4.4 - 5.9 10e12/L    Hemoglobin 16.4 13.3 - 17.7 g/dL    Hematocrit 46.1 40.0 - 53.0 %    MCV 86 78 - 100 fl    MCH 30.7 26.5 - 33.0 pg    MCHC 35.6 31.5 - 36.5 g/dL    RDW 12.3 10.0 - 15.0 %    Platelet Count 231 150 - 450 10e9/L       Recent Labs   Lab Test  03/11/16   0935  02/07/14   1505  08/12/13   0823   HGB  14.9  15.5   --    PLT  219  228   --    NA  141  137  143   POTASSIUM  4.6  4.1  4.6   CR  0.98  1.23  1.03   A1C   --   5.5  5.4        IMPRESSION:   Reason for surgery/procedure: lumbar radiculopathy  Diagnosis/reason for consult: preoperative exam for the above procedure.     The proposed surgical procedure is considered INTERMEDIATE risk.    REVISED CARDIAC RISK INDEX  The patient has the following serious cardiovascular risks for perioperative complications such as (MI, PE, VFib and 3  AV Block):  No serious cardiac risks  INTERPRETATION: 0 risks: Class I (very low risk - 0.4% complication rate)    The patient has the following additional risks for perioperative complications:  No identified additional risks      ICD-10-CM    1. Preop general physical exam Z01.818 CBC with platelets   2. Lumbar radiculopathy M54.16    3. Congenital hypothyroidism without goiter E03.1        RECOMMENDATIONS:     NPO after midnight. No asa or nsaids until instructed by surgeon. No alcohol 24 hours prior.     --Patient is to take all scheduled medications on the day of surgery    APPROVAL GIVEN to proceed with proposed procedure, without further diagnostic  evaluation       Signed Electronically by: Buddy Gleason PA-C    Copy of this evaluation report is provided to requesting physician.    [place] Preop Guidelines

## 2018-03-22 ENCOUNTER — DOCUMENTATION ONLY (OUTPATIENT)
Dept: NEUROSURGERY | Facility: CLINIC | Age: 43
End: 2018-03-22

## 2018-03-22 NOTE — PROGRESS NOTES
3/22/2018    [employer] Forms: yes    Faxed to [phone]     Type of form: Certification of Health care provider for employee's serious health condition (family and medical leave act)    Placed a copy in the bin and sent the original to medical records

## 2018-03-30 NOTE — H&P (VIEW-ONLY)
[facility]  [address] [facility] [address]  [phone]  Dept: [phone]    PRE-OP EVALUATION:  Today's date: 3/21/2018    Kodak Crowe (: 1975) presents for pre-operative evaluation assessment as requested by Dr. Segura, Suhail Morales MD .  He requires evaluation and anesthesia risk assessment prior to undergoing surgery/procedure for treatment of  right L4-5 microdiscectomy  .    Fax number for surgical facility:   Primary Physician: Buddy Gleason  Type of Anesthesia Anticipated: General    Patient has a Health Care Directive or Living Will:  NO    Preop Questions 3/21/2018   Who is doing your surgery? dr segura   What are you having done? microdicectomy   Date of Surgery/Procedure: 2018   Facility or Hospital where procedure/surgery will be performed: [facility]   1.  Do you have a history of Heart attack, stroke, stent, coronary bypass surgery, or other heart surgery? No   2.  Do you ever have any pain or discomfort in your chest? No   3.  Do you have a history of  Heart Failure? No   4.   Are you troubled by shortness of breath when:  walking on a level surface, or up a slight hill, or at night? No   5.  Do you currently have a cold, bronchitis or other respiratory infection? No   6.  Do you have a cough, shortness of breath, or wheezing? No   7.  Do you sometimes get pains in the calves of your legs when you walk? No   8. Do you or anyone in your family have previous history of blood clots? No   9.  Do you or does anyone in your family have a serious bleeding problem such as prolonged bleeding following surgeries or cuts? No   10. Have you ever had problems with anemia or been told to take iron pills? No   11. Have you had any abnormal blood loss such as black, tarry or bloody stools? No   12. Have you ever had a blood transfusion? No   13. Have you or any of your relatives ever had problems with anesthesia? No   14. Do  you have sleep apnea, excessive snoring or daytime drowsiness? No   15. Do you have any prosthetic heart valves? No   16. Do you have prosthetic joints? No         HPI:     HPI related to upcoming procedure:       HYPOTHYROIDISM - Patient has a longstanding history of chronic Hypothyroidism. Patient has been doing well, noting no tremor, insomnia, hair loss or changes in skin texture. Last TSH value of   TSH   Date Value Ref Range Status   06/13/2017 1.83 0.40 - 4.00 mU/L Final   ]  . Continues to take medications as directed, without adverse reactions or side effects.                                                                                                                                                                                                                        .    MEDICAL HISTORY:     Patient Active Problem List    Diagnosis Date Noted     Lumbar radiculopathy 10/13/2017     Priority: Medium     Hyperlipidemia with target LDL less than 160 02/10/2014     Priority: Medium     Diagnosis updated by automated process. Provider to review and confirm.       Hypothyroid 08/12/2013     Priority: Medium     Family history of hemochromatosis 04/09/2010     Priority: Medium      Past Medical History:   Diagnosis Date     Tourette's disorder 1986     Past Surgical History:   Procedure Laterality Date     ENT SURGERY       VASECTOMY       Current Outpatient Prescriptions   Medication Sig Dispense Refill     levothyroxine (SYNTHROID) 125 MCG tablet Take 1 tablet (125 mcg) by mouth daily 90 tablet 3     HYDROmorphone (DILAUDID) 2 MG tablet Take 1-2 tablets (2-4 mg) by mouth every 4 hours as needed for pain (Patient not taking: Reported on 3/21/2018) 30 tablet 0     methylPREDNISolone (MEDROL DOSEPAK) 4 MG tablet Follow package instructions (Patient not taking: Reported on 3/21/2018) 21 tablet 0     OTC products: no recent use of OTC ASA, NSAIDS or Steroids    Allergies   Allergen Reactions     No Known Drug  Allergies       Latex Allergy: NO    Social History   Substance Use Topics     Smoking status: Never Smoker     Smokeless tobacco: Never Used     Alcohol use 0.0 oz/week     0 Standard drinks or equivalent per week      Comment: occasional     History   Drug Use No       REVIEW OF SYSTEMS:   CONSTITUTIONAL: NEGATIVE for fever, chills, change in weight  INTEGUMENTARY/SKIN: NEGATIVE for worrisome rashes, moles or lesions  EYES: NEGATIVE for vision changes or irritation  ENT/MOUTH: NEGATIVE for ear, mouth and throat problems  RESP: NEGATIVE for significant cough or SOB  CV: NEGATIVE for chest pain, palpitations or peripheral edema  GI: NEGATIVE for nausea, abdominal pain, heartburn, or change in bowel habits  : NEGATIVE for frequency, dysuria, or hematuria  MUSCULOSKELETAL: ongoing back pain. Otherwise, NEGATIVE for significant arthralgias or myalgia  NEURO: NEGATIVE for weakness, dizziness or paresthesias  ENDOCRINE: NEGATIVE for temperature intolerance, skin/hair changes  HEME: NEGATIVE for bleeding problems  PSYCHIATRIC: NEGATIVE for changes in mood or affect    EXAM:   /86 (BP Location: Right arm, Patient Position: Chair, Cuff Size: Adult Large)  Pulse 69  Temp 97.7  F (36.5  C) (Oral)  Resp 16  Ht 6' (1.829 m)  Wt 246 lb (111.6 kg)  SpO2 99%  BMI 33.36 kg/m2    GENERAL APPEARANCE: alert, active and mild distress     EYES: EOMI,  PERRL     HENT: ear canals and TM's normal and nose and mouth without ulcers or lesions     NECK: no adenopathy, no asymmetry, masses, or scars and thyroid normal to palpation     RESP: lungs clear to auscultation - no rales, rhonchi or wheezes     CV: regular rates and rhythm, normal S1 S2, no S3 or S4 and no murmur, click or rub     ABDOMEN:  soft, nontender, no HSM or masses and bowel sounds normal     MS: extremities normal- no gross deformities noted, no evidence of inflammation in joints, FROM in all extremities.     SKIN: no suspicious lesions or rashes     NEURO:  mentation intact, speech normal and gait abnormal with limp     PSYCH: mentation appears normal. and affect normal/bright     LYMPHATICS: No cervical adenopathy    DIAGNOSTICS:     EKG: Not indicated due to non-vascular surgery and low risk of event (age <65 and without cardiac risk factors)  Labs Resulted Today:   Results for orders placed or performed in visit on 03/21/18   CBC with platelets   Result Value Ref Range    WBC 9.3 4.0 - 11.0 10e9/L    RBC Count 5.34 4.4 - 5.9 10e12/L    Hemoglobin 16.4 13.3 - 17.7 g/dL    Hematocrit 46.1 40.0 - 53.0 %    MCV 86 78 - 100 fl    MCH 30.7 26.5 - 33.0 pg    MCHC 35.6 31.5 - 36.5 g/dL    RDW 12.3 10.0 - 15.0 %    Platelet Count 231 150 - 450 10e9/L       Recent Labs   Lab Test  03/11/16   0935  02/07/14   1505  08/12/13   0823   HGB  14.9  15.5   --    PLT  219  228   --    NA  141  137  143   POTASSIUM  4.6  4.1  4.6   CR  0.98  1.23  1.03   A1C   --   5.5  5.4        IMPRESSION:   Reason for surgery/procedure: lumbar radiculopathy  Diagnosis/reason for consult: preoperative exam for the above procedure.     The proposed surgical procedure is considered INTERMEDIATE risk.    REVISED CARDIAC RISK INDEX  The patient has the following serious cardiovascular risks for perioperative complications such as (MI, PE, VFib and 3  AV Block):  No serious cardiac risks  INTERPRETATION: 0 risks: Class I (very low risk - 0.4% complication rate)    The patient has the following additional risks for perioperative complications:  No identified additional risks      ICD-10-CM    1. Preop general physical exam Z01.818 CBC with platelets   2. Lumbar radiculopathy M54.16    3. Congenital hypothyroidism without goiter E03.1        RECOMMENDATIONS:     NPO after midnight. No asa or nsaids until instructed by surgeon. No alcohol 24 hours prior.     --Patient is to take all scheduled medications on the day of surgery    APPROVAL GIVEN to proceed with proposed procedure, without further diagnostic  evaluation       Signed Electronically by: Buddy Gleason PA-C    Copy of this evaluation report is provided to requesting physician.    [facility] Preop Guidelines

## 2018-04-02 ENCOUNTER — APPOINTMENT (OUTPATIENT)
Dept: GENERAL RADIOLOGY | Facility: CLINIC | Age: 43
End: 2018-04-02
Attending: NEUROLOGICAL SURGERY
Payer: COMMERCIAL

## 2018-04-02 ENCOUNTER — ANESTHESIA (OUTPATIENT)
Dept: SURGERY | Facility: CLINIC | Age: 43
End: 2018-04-02
Payer: COMMERCIAL

## 2018-04-02 ENCOUNTER — SURGERY (OUTPATIENT)
Age: 43
End: 2018-04-02

## 2018-04-02 ENCOUNTER — HOSPITAL ENCOUNTER (OUTPATIENT)
Facility: CLINIC | Age: 43
Discharge: HOME | End: 2018-04-02
Attending: NEUROLOGICAL SURGERY | Admitting: NEUROLOGICAL SURGERY
Payer: COMMERCIAL

## 2018-04-02 ENCOUNTER — ANESTHESIA EVENT (OUTPATIENT)
Dept: SURGERY | Facility: CLINIC | Age: 43
End: 2018-04-02
Payer: COMMERCIAL

## 2018-04-02 VITALS
DIASTOLIC BLOOD PRESSURE: 72 MMHG | BODY MASS INDEX: 32.64 KG/M2 | HEIGHT: 72 IN | OXYGEN SATURATION: 95 % | WEIGHT: 241 LBS | SYSTOLIC BLOOD PRESSURE: 129 MMHG | TEMPERATURE: 97.3 F | RESPIRATION RATE: 18 BRPM

## 2018-04-02 DIAGNOSIS — Z98.890 S/P LUMBAR MICRODISCECTOMY: Primary | ICD-10-CM

## 2018-04-02 DIAGNOSIS — M54.16 LUMBAR RADICULOPATHY: ICD-10-CM

## 2018-04-02 PROCEDURE — 37000008 ZZH ANESTHESIA TECHNICAL FEE, 1ST 30 MIN: Performed by: NEUROLOGICAL SURGERY

## 2018-04-02 PROCEDURE — 37000009 ZZH ANESTHESIA TECHNICAL FEE, EACH ADDTL 15 MIN: Performed by: NEUROLOGICAL SURGERY

## 2018-04-02 PROCEDURE — 63030 LAMOT DCMPRN NRV RT 1 LMBR: CPT | Performed by: NEUROLOGICAL SURGERY

## 2018-04-02 PROCEDURE — 40000306 ZZH STATISTIC PRE PROC ASSESS II: Performed by: NEUROLOGICAL SURGERY

## 2018-04-02 PROCEDURE — 27210794 ZZH OR GENERAL SUPPLY STERILE: Performed by: NEUROLOGICAL SURGERY

## 2018-04-02 PROCEDURE — 25000566 ZZH SEVOFLURANE, EA 15 MIN: Performed by: NEUROLOGICAL SURGERY

## 2018-04-02 PROCEDURE — 25000128 H RX IP 250 OP 636: Performed by: NEUROLOGICAL SURGERY

## 2018-04-02 PROCEDURE — 25000125 ZZHC RX 250: Performed by: NEUROLOGICAL SURGERY

## 2018-04-02 PROCEDURE — 36000071 ZZH SURGERY LEVEL 5 W FLUORO 1ST 30 MIN: Performed by: NEUROLOGICAL SURGERY

## 2018-04-02 PROCEDURE — 71000027 ZZH RECOVERY PHASE 2 EACH 15 MINS: Performed by: NEUROLOGICAL SURGERY

## 2018-04-02 PROCEDURE — 25800025 ZZH RX 258: Performed by: NEUROLOGICAL SURGERY

## 2018-04-02 PROCEDURE — 25000128 H RX IP 250 OP 636: Performed by: NURSE ANESTHETIST, CERTIFIED REGISTERED

## 2018-04-02 PROCEDURE — 40000985 XR LUMBAR SPINE PORT 1 VW: Mod: TC

## 2018-04-02 PROCEDURE — 25000128 H RX IP 250 OP 636: Performed by: ANESTHESIOLOGY

## 2018-04-02 PROCEDURE — 25000132 ZZH RX MED GY IP 250 OP 250 PS 637: Performed by: ANESTHESIOLOGY

## 2018-04-02 PROCEDURE — 25000300 ZZH OR RX SURGIFLO HEMOSTATIC MATRIX 10ML 199102S OPNP: Performed by: NEUROLOGICAL SURGERY

## 2018-04-02 PROCEDURE — 71000012 ZZH RECOVERY PHASE 1 LEVEL 1 FIRST HR: Performed by: NEUROLOGICAL SURGERY

## 2018-04-02 PROCEDURE — 36000069 ZZH SURGERY LEVEL 5 EA 15 ADDTL MIN: Performed by: NEUROLOGICAL SURGERY

## 2018-04-02 PROCEDURE — 25000125 ZZHC RX 250: Performed by: NURSE ANESTHETIST, CERTIFIED REGISTERED

## 2018-04-02 RX ORDER — PROPOFOL 10 MG/ML
INJECTION, EMULSION INTRAVENOUS PRN
Status: DISCONTINUED | OUTPATIENT
Start: 2018-04-02 | End: 2018-04-02

## 2018-04-02 RX ORDER — ONDANSETRON 2 MG/ML
4 INJECTION INTRAMUSCULAR; INTRAVENOUS EVERY 30 MIN PRN
Status: DISCONTINUED | OUTPATIENT
Start: 2018-04-02 | End: 2018-04-02 | Stop reason: HOSPADM

## 2018-04-02 RX ORDER — ONDANSETRON 4 MG/1
4 TABLET, ORALLY DISINTEGRATING ORAL EVERY 30 MIN PRN
Status: DISCONTINUED | OUTPATIENT
Start: 2018-04-02 | End: 2018-04-02 | Stop reason: HOSPADM

## 2018-04-02 RX ORDER — BUPIVACAINE HYDROCHLORIDE AND EPINEPHRINE 5; 5 MG/ML; UG/ML
INJECTION, SOLUTION PERINEURAL PRN
Status: DISCONTINUED | OUTPATIENT
Start: 2018-04-02 | End: 2018-04-02 | Stop reason: HOSPADM

## 2018-04-02 RX ORDER — ALBUTEROL SULFATE 0.83 MG/ML
2.5 SOLUTION RESPIRATORY (INHALATION) EVERY 4 HOURS PRN
Status: DISCONTINUED | OUTPATIENT
Start: 2018-04-02 | End: 2018-04-02 | Stop reason: HOSPADM

## 2018-04-02 RX ORDER — KETOROLAC TROMETHAMINE 30 MG/ML
30 INJECTION, SOLUTION INTRAMUSCULAR; INTRAVENOUS EVERY 6 HOURS PRN
Status: DISCONTINUED | OUTPATIENT
Start: 2018-04-02 | End: 2018-04-02 | Stop reason: HOSPADM

## 2018-04-02 RX ORDER — DEXAMETHASONE SODIUM PHOSPHATE 4 MG/ML
INJECTION, SOLUTION INTRA-ARTICULAR; INTRALESIONAL; INTRAMUSCULAR; INTRAVENOUS; SOFT TISSUE PRN
Status: DISCONTINUED | OUTPATIENT
Start: 2018-04-02 | End: 2018-04-02

## 2018-04-02 RX ORDER — CEFAZOLIN SODIUM 1 G/3ML
1 INJECTION, POWDER, FOR SOLUTION INTRAMUSCULAR; INTRAVENOUS SEE ADMIN INSTRUCTIONS
Status: DISCONTINUED | OUTPATIENT
Start: 2018-04-02 | End: 2018-04-02 | Stop reason: HOSPADM

## 2018-04-02 RX ORDER — DIAZEPAM 5 MG
5 TABLET ORAL EVERY 8 HOURS PRN
Qty: 20 TABLET | Refills: 0 | Status: SHIPPED | OUTPATIENT
Start: 2018-04-02 | End: 2018-05-16

## 2018-04-02 RX ORDER — GLYCOPYRROLATE 0.2 MG/ML
INJECTION, SOLUTION INTRAMUSCULAR; INTRAVENOUS PRN
Status: DISCONTINUED | OUTPATIENT
Start: 2018-04-02 | End: 2018-04-02

## 2018-04-02 RX ORDER — CEFAZOLIN SODIUM 2 G/100ML
2 INJECTION, SOLUTION INTRAVENOUS
Status: COMPLETED | OUTPATIENT
Start: 2018-04-02 | End: 2018-04-02

## 2018-04-02 RX ORDER — HYDROMORPHONE HYDROCHLORIDE 2 MG/1
2-4 TABLET ORAL EVERY 4 HOURS PRN
Qty: 60 TABLET | Refills: 0 | Status: SHIPPED | OUTPATIENT
Start: 2018-04-02 | End: 2018-05-16

## 2018-04-02 RX ORDER — HYDROMORPHONE HYDROCHLORIDE 2 MG/1
2 TABLET ORAL ONCE
Status: COMPLETED | OUTPATIENT
Start: 2018-04-02 | End: 2018-04-02

## 2018-04-02 RX ORDER — FENTANYL CITRATE 50 UG/ML
25-50 INJECTION, SOLUTION INTRAMUSCULAR; INTRAVENOUS EVERY 5 MIN PRN
Status: DISCONTINUED | OUTPATIENT
Start: 2018-04-02 | End: 2018-04-02 | Stop reason: HOSPADM

## 2018-04-02 RX ORDER — HYDRALAZINE HYDROCHLORIDE 20 MG/ML
2.5-5 INJECTION INTRAMUSCULAR; INTRAVENOUS EVERY 10 MIN PRN
Status: DISCONTINUED | OUTPATIENT
Start: 2018-04-02 | End: 2018-04-02 | Stop reason: HOSPADM

## 2018-04-02 RX ORDER — ONDANSETRON 2 MG/ML
INJECTION INTRAMUSCULAR; INTRAVENOUS PRN
Status: DISCONTINUED | OUTPATIENT
Start: 2018-04-02 | End: 2018-04-02

## 2018-04-02 RX ORDER — FENTANYL CITRATE 50 UG/ML
INJECTION, SOLUTION INTRAMUSCULAR; INTRAVENOUS PRN
Status: DISCONTINUED | OUTPATIENT
Start: 2018-04-02 | End: 2018-04-02

## 2018-04-02 RX ORDER — FENTANYL CITRATE 50 UG/ML
25-50 INJECTION, SOLUTION INTRAMUSCULAR; INTRAVENOUS
Status: DISCONTINUED | OUTPATIENT
Start: 2018-04-02 | End: 2018-04-02 | Stop reason: HOSPADM

## 2018-04-02 RX ORDER — OXYCODONE HYDROCHLORIDE 5 MG/1
5 TABLET ORAL EVERY 4 HOURS PRN
Status: DISCONTINUED | OUTPATIENT
Start: 2018-04-02 | End: 2018-04-02 | Stop reason: HOSPADM

## 2018-04-02 RX ORDER — EPHEDRINE SULFATE 50 MG/ML
INJECTION, SOLUTION INTRAMUSCULAR; INTRAVENOUS; SUBCUTANEOUS PRN
Status: DISCONTINUED | OUTPATIENT
Start: 2018-04-02 | End: 2018-04-02

## 2018-04-02 RX ORDER — SODIUM CHLORIDE, SODIUM LACTATE, POTASSIUM CHLORIDE, CALCIUM CHLORIDE 600; 310; 30; 20 MG/100ML; MG/100ML; MG/100ML; MG/100ML
INJECTION, SOLUTION INTRAVENOUS CONTINUOUS
Status: DISCONTINUED | OUTPATIENT
Start: 2018-04-02 | End: 2018-04-02 | Stop reason: HOSPADM

## 2018-04-02 RX ORDER — MEPERIDINE HYDROCHLORIDE 25 MG/ML
12.5 INJECTION INTRAMUSCULAR; INTRAVENOUS; SUBCUTANEOUS
Status: DISCONTINUED | OUTPATIENT
Start: 2018-04-02 | End: 2018-04-02 | Stop reason: HOSPADM

## 2018-04-02 RX ORDER — METOPROLOL TARTRATE 1 MG/ML
1-2 INJECTION, SOLUTION INTRAVENOUS EVERY 5 MIN PRN
Status: DISCONTINUED | OUTPATIENT
Start: 2018-04-02 | End: 2018-04-02 | Stop reason: HOSPADM

## 2018-04-02 RX ORDER — LIDOCAINE 40 MG/G
CREAM TOPICAL
Status: DISCONTINUED | OUTPATIENT
Start: 2018-04-02 | End: 2018-04-02 | Stop reason: HOSPADM

## 2018-04-02 RX ORDER — NALOXONE HYDROCHLORIDE 0.4 MG/ML
.1-.4 INJECTION, SOLUTION INTRAMUSCULAR; INTRAVENOUS; SUBCUTANEOUS
Status: DISCONTINUED | OUTPATIENT
Start: 2018-04-02 | End: 2018-04-02 | Stop reason: HOSPADM

## 2018-04-02 RX ORDER — LIDOCAINE HYDROCHLORIDE 10 MG/ML
INJECTION, SOLUTION INFILTRATION; PERINEURAL PRN
Status: DISCONTINUED | OUTPATIENT
Start: 2018-04-02 | End: 2018-04-02

## 2018-04-02 RX ORDER — NEOSTIGMINE METHYLSULFATE 1 MG/ML
VIAL (ML) INJECTION PRN
Status: DISCONTINUED | OUTPATIENT
Start: 2018-04-02 | End: 2018-04-02

## 2018-04-02 RX ADMIN — GLYCOPYRROLATE 0.4 MG: 0.2 INJECTION, SOLUTION INTRAMUSCULAR; INTRAVENOUS at 12:10

## 2018-04-02 RX ADMIN — DEXMEDETOMIDINE HYDROCHLORIDE 0.2 MCG/KG/HR: 100 INJECTION, SOLUTION INTRAVENOUS at 10:45

## 2018-04-02 RX ADMIN — BUPIVACAINE HYDROCHLORIDE AND EPINEPHRINE BITARTRATE 17 ML: 5; .005 INJECTION, SOLUTION EPIDURAL; INTRACAUDAL; PERINEURAL at 12:03

## 2018-04-02 RX ADMIN — LIDOCAINE HYDROCHLORIDE 50 MG: 10 INJECTION, SOLUTION INFILTRATION; PERINEURAL at 10:30

## 2018-04-02 RX ADMIN — ONDANSETRON 4 MG: 2 INJECTION INTRAMUSCULAR; INTRAVENOUS at 11:59

## 2018-04-02 RX ADMIN — HYDROMORPHONE HYDROCHLORIDE 1 MG: 1 INJECTION, SOLUTION INTRAMUSCULAR; INTRAVENOUS; SUBCUTANEOUS at 10:30

## 2018-04-02 RX ADMIN — ROCURONIUM BROMIDE 40 MG: 10 INJECTION INTRAVENOUS at 10:30

## 2018-04-02 RX ADMIN — GENTAMICIN SULFATE 1000 ML: 40 INJECTION, SOLUTION INTRAMUSCULAR; INTRAVENOUS at 12:04

## 2018-04-02 RX ADMIN — Medication 3 MG: at 12:10

## 2018-04-02 RX ADMIN — SODIUM CHLORIDE, POTASSIUM CHLORIDE, SODIUM LACTATE AND CALCIUM CHLORIDE: 600; 310; 30; 20 INJECTION, SOLUTION INTRAVENOUS at 11:00

## 2018-04-02 RX ADMIN — MIDAZOLAM 2 MG: 1 INJECTION INTRAMUSCULAR; INTRAVENOUS at 10:23

## 2018-04-02 RX ADMIN — FENTANYL CITRATE 25 MCG: 50 INJECTION, SOLUTION INTRAMUSCULAR; INTRAVENOUS at 12:41

## 2018-04-02 RX ADMIN — CEFAZOLIN SODIUM 2 G: 2 INJECTION, SOLUTION INTRAVENOUS at 10:23

## 2018-04-02 RX ADMIN — HYDROMORPHONE HYDROCHLORIDE 2 MG: 2 TABLET ORAL at 13:13

## 2018-04-02 RX ADMIN — FENTANYL CITRATE 100 MCG: 50 INJECTION, SOLUTION INTRAMUSCULAR; INTRAVENOUS at 10:30

## 2018-04-02 RX ADMIN — DEXAMETHASONE SODIUM PHOSPHATE 8 MG: 4 INJECTION, SOLUTION INTRA-ARTICULAR; INTRALESIONAL; INTRAMUSCULAR; INTRAVENOUS; SOFT TISSUE at 10:30

## 2018-04-02 RX ADMIN — PROPOFOL 200 MG: 10 INJECTION, EMULSION INTRAVENOUS at 10:30

## 2018-04-02 RX ADMIN — SODIUM CHLORIDE, POTASSIUM CHLORIDE, SODIUM LACTATE AND CALCIUM CHLORIDE: 600; 310; 30; 20 INJECTION, SOLUTION INTRAVENOUS at 10:23

## 2018-04-02 NOTE — ANESTHESIA CARE TRANSFER NOTE
Patient: Kodak Crowe    Procedure(s):  right L4-5 microdiscectomy - Wound Class: I-Clean    Diagnosis: right L4-5 herniated disc  Diagnosis Additional Information: No value filed.    Anesthesia Type:   General, ETT     Note:  Airway :Face Mask  Patient transferred to:PACU  Handoff Report: Identifed the Patient, Identified the Reponsible Provider, Reviewed the pertinent medical history, Discussed the surgical course, Reviewed Intra-OP anesthesia mangement and issues during anesthesia, Set expectations for post-procedure period and Allowed opportunity for questions and acknowledgement of understanding      Vitals: (Last set prior to Anesthesia Care Transfer)    CRNA VITALS  4/2/2018 1148 - 4/2/2018 1225      4/2/2018             Pulse: 86    SpO2: 99 %    Resp Rate (observed): 21                Electronically Signed By: EUSEBIO Elias CRNA  April 2, 2018  12:25 PM

## 2018-04-02 NOTE — DISCHARGE INSTRUCTIONS
SPINE SURGERY DISCHARGE INSTRUCTIONS  DR. BRINDA BARNHART M.D.  [phone]    1.  NO HEAVY LIFITING  - 10 POUNDS UNTIL FOLLOW-UP VISIT IN 6-8 WEEKS.    2.  OK TO REMOVE DRESSING IN 24 HOURS AND SHOWER OR BATHE, BUT DO NOT SCRUB OR SUBMERGE INCISION UNDER WATER FOR 6 WEEKS.  IF YOU HAVE DRAINAGE FROM THE INCISION, YOU MAY REPLACE THE DRESSING AS NEEDED.    3.  OK TO USE ICE PACKS TO THE BACK AREA FOR 20 MINUTES ON AND 20 MINUTES OFF FOR 24-48 HOURS AFTER SURGERY.  AVOID ICE CONTACT DIRECTLY TO THE SKIN OR INCISION.    4.  OK TO WALK AS MUCH AS TOLERATED.    5.  NO CONTACT SPORTS UNTIL FOLLOW-UP VISIT.    6.  NO HIGH IMPACT ACTIVITIES SUCH AS RUNNING/JOGGING, SNOWMOBILE OR FOUR COBOS RIDING OR ANY OTHER RECREACTIONAL VEHICLES.    CALL MY OFFICE [phone] FOR INCREASING REDNESS, SWELLING OR PUS DRAINING FROM THE INCISION.  CALL IF YOU HAVE INCREASED PAIN OR ANY OTHER QUESTIONS OR CONCERNS.    GENERAL ANESTHESIA OR SEDATION ADULT DISCHARGE INSTRUCTIONS   SPECIAL PRECAUTIONS FOR 24 HOURS AFTER SURGERY    IT IS NOT UNUSUAL TO FEEL LIGHT-HEADED OR FAINT, UP TO 24 HOURS AFTER SURGERY OR WHILE TAKING PAIN MEDICATION.  IF YOU HAVE THESE SYMPTOMS; SIT FOR A FEW MINUTES BEFORE STANDING AND HAVE SOMEONE ASSIST YOU WHEN YOU GET UP TO WALK OR USE THE BATHROOM.    YOU SHOULD REST AND RELAX FOR THE NEXT 24 HOURS AND YOU MUST MAKE ARRANGEMENTS TO HAVE SOMEONE STAY WITH YOU FOR AT LEAST 24 HOURS AFTER YOUR DISCHARGE.  AVOID HAZARDOUS AND STRENUOUS ACTIVITIES.  DO NOT MAKE IMPORTANT DECISIONS FOR 24 HOURS.    DO NOT DRIVE ANY VEHICLE OR OPERATE MECHANICAL EQUIPMENT FOR 24 HOURS FOLLOWING THE END OF YOUR SURGERY.  EVEN THOUGH YOU MAY FEEL NORMAL, YOUR REACTIONS MAY BE AFFECTED BY THE MEDICATION YOU HAVE RECEIVED.    DO NOT DRINK ALCOHOLIC BEVERAGES FOR 24 HOURS FOLLOWING YOUR SURGERY.    DRINK CLEAR LIQUIDS (APPLE JUICE, GINGER ALE, 7-UP, BROTH, ETC.).  PROGRESS TO YOUR REGULAR DIET AS YOU FEEL ABLE.    YOU MAY HAVE A DRY  MOUTH, A SORE THROAT, MUSCLES ACHES OR TROUBLE SLEEPING.  THESE SHOULD GO AWAY AFTER 24 HOURS.    CALL YOUR DOCTOR FOR ANY OF THE FOLLOWING:  SIGNS OF INFECTION (FEVER, GROWING TENDERNESS AT THE SURGERY SITE, A LARGE AMOUNT OF DRAINAGE OR BLEEDING, SEVERE PAIN, FOUL-SMELLING DRAINAGE, REDNESS OR SWELLING.    IT HAS BEEN OVER 8 TO 10 HOURS SINCE SURGERY AND YOU ARE STILL NOT ABLE TO URINATE (PASS WATER).       MEDICATION YOU RECEIVED:   You took 1 Dilaudid (2 mg) tablet at 1:15 PM.

## 2018-04-02 NOTE — IP AVS SNAPSHOT
[facility] PreOP/PostOP    [address]    [facility] [address]    Phone:  [phone]    Fax:  [phone]                                       After Visit Summary   4/2/2018    Kodak Crowe    MRN: [id number]           After Visit Summary Signature Page     I have received my discharge instructions, and my questions have been answered. I have discussed any challenges I see with this plan with the nurse or doctor.    ..........................................................................................................................................  Patient/Patient Representative Signature      ..........................................................................................................................................  Patient Representative Print Name and Relationship to Patient    ..................................................               ................................................  Date                                            Time    ..........................................................................................................................................  Reviewed by Signature/Title    ...................................................              ..............................................  Date                                                            Time

## 2018-04-02 NOTE — ANESTHESIA PREPROCEDURE EVALUATION
Anesthesia Evaluation     . Pt has had prior anesthetic. Type: General    No history of anesthetic complications          ROS/MED HX    ENT/Pulmonary:       Neurologic:     (+)other neuro lumbar radiculopathy, tourrette's    Cardiovascular:         METS/Exercise Tolerance:     Hematologic:         Musculoskeletal:         GI/Hepatic:         Renal/Genitourinary:         Endo:     (+) thyroid problem .      Psychiatric:         Infectious Disease:         Malignancy:         Other:                     Physical Exam      Airway   Mallampati: II  TM distance: >3 FB  Neck ROM: full    Dental     Cardiovascular   Rhythm and rate: regular and normal      Pulmonary    breath sounds clear to auscultation                    Anesthesia Plan      History & Physical Review  History and physical reviewed and following examination; no interval change.    ASA Status:  2 .    NPO Status:  > 8 hours    Plan for General and ETT with Intravenous and Propofol induction. Maintenance will be Balanced.    PONV prophylaxis:  Ondansetron (or other 5HT-3) and Dexamethasone or Solumedrol       Postoperative Care  Postoperative pain management:  IV analgesics, Oral pain medications and Multi-modal analgesia.      Consents  Anesthetic plan, risks, benefits and alternatives discussed with:  Patient..                          .

## 2018-04-02 NOTE — ANESTHESIA POSTPROCEDURE EVALUATION
Patient: Kodak Crowe    Procedure(s):  right L4-5 microdiscectomy - Wound Class: I-Clean    Diagnosis:right L4-5 herniated disc  Diagnosis Additional Information: PREOPERATIVE DIAGNOSIS: Right L4-5 herniated disk with right lower extremity radiculopathy      POSTOPERATIVE DIAGNOSIS: Same     PROCEDURE: Right L4-5 hemilaminectomy, medial facetectomy, foraminotomy with microdiscectomy and use of X-ray and intrao, perative microscope     Anesthesia Type:  General, ETT    Note:  Anesthesia Post Evaluation    Patient location during evaluation: PACU  Patient participation: Able to fully participate in evaluation  Level of consciousness: awake  Pain management: adequate  Airway patency: patent  Cardiovascular status: acceptable  Respiratory status: acceptable  Hydration status: acceptable  PONV: controlled     Anesthetic complications: None          Last vitals:  Vitals:    04/02/18 0835 04/02/18 1221 04/02/18 1225   BP: (!) 139/95 147/81 143/89   Resp: 16 15 11   Temp: 98.5  F (36.9  C) 97.9  F (36.6  C)    SpO2: 98% 99% 100%         Electronically Signed By: Dylon Johnson MD  April 2, 2018  12:30 PM

## 2018-04-02 NOTE — OP NOTE
Operative Report    PREOPERATIVE DIAGNOSIS: Right L4-5 herniated disk with right lower extremity radiculopathy     POSTOPERATIVE DIAGNOSIS: Same    PROCEDURE: Right L4-5 hemilaminectomy, medial facetectomy, foraminotomy with microdiscectomy and use of X-ray and intraoperative microscope     ASSISTANT: Zion Plascencia     INDICATION FOR PROCEDURE: The patient is a 42 year old male that presented with RLE radiculopathy. After reviewing the imaging studies and examination, the decision was made to proceed with the above procedure. The patient understood the risk of surgery to be infection, CSF leak, nerve root injury, failure of improvement of his symptoms. The patient voiced understanding and wanted to proceed.     DESCRIPTION OF PROCEDURE: The patient was seen in the pre op area and the procedure was discussed with the patient and family once again and all questions were answered. The consent was then signed and the lumbar spine was marked with a marker. The patient was transported to the operating room on a stretcher and received general endotracheal anesthesia. The patient was placed on the operating table in the prone position on the Lorenzo frame with all pressure points padded. The planned operative area of the back was prepped and draped in normal sterile fashion. Portable X-ray was used to identify the appropriate level. Local anesthesia was then injected along the planned incision. A 10 blade scalpel was used to make a midline incision with dissection down through the subcutaneous tissue to the fascia. The fascia was opened on the right side with the Bovie cautery. Subperiosteal dissection exposed the lamina facet joint at L4-5. The Preston retractor was then placed. The Midas Dax drill and the Kerrison rongeur were used to perform the hemilaminectomy, medial facetectomy, foraminotomy, and to remove the ligamentum flavum. The thecal sac and L5 nerve root were identified. There was a large inferiorly migrated  disk fragment compressing the right L5 root. The annular tear was identified and the disk was then removed with the Jelly curette and also the pituitary rongeur. The nerve root was then noted to be decompressed and the Curry ball hook was used to verify that. Copious irrigation was performed with saline. The retractor was removed and the wound was irrigated once again. The fascia was then closed with 0-Vicryl, the subcutaneous tissue with 3-0 Vicryl, and the skin closed with dermabond. The patient was then transported back to the stretcher, extubated, and sent to recovery. At the end of the case, all counts were correct.     No complications.     ESTIMATED BLOOD LOSS: 5 ml     IV FLUID: See Anesthesia report     The patient received Ancef preoperatively.     Suhail Segura MD, MS, FAANS

## 2018-04-02 NOTE — IP AVS SNAPSHOT
MRN:[id number]                      After Visit Summary   4/2/2018    Kodak Crowe    MRN: [id number]           Thank you!     Thank you for choosing [facility] for your care. Our goal is always to provide you with excellent care. Hearing back from our patients is one way we can continue to improve our services. Please take a few minutes to complete the written survey that you may receive in the mail after you visit. If you would like to speak to someone directly about your visit please contact Patient Relations at [phone]. Thank you!          Patient Information     Date Of Birth          1975        About your hospital stay     You were admitted on:  April 2, 2018 You last received care in the:  [facility] PreOP/PostOP    You were discharged on:  April 2, 2018        Reason for your hospital stay       42 year old male s/p right L4-5 microdiscectomy                  Who to Call     For medical emergencies, please call 911.  For non-urgent questions about your medical care, please call your primary care provider or clinic, [phone]  For questions related to your surgery, please call your surgery clinic        Attending Provider     Provider Specialty    Suhail Segura MD Neurosurgery       Primary Care Provider Office Phone # Fax #    Buddy Lior Gleason PA-C [phone]      After Care Instructions     Activity       Your activity upon discharge: no heavy lifting for 6 weeks            Diet       Follow this diet upon discharge: Regular                  Follow-up Appointments     Follow-up and recommended labs and tests        Neurosurgery appointment with Madelyn Hoffmann CNP or Brandy Rosen CNP at [facility] in 4-6 weeks,  call [phone] for appointment                  Your next 10 appointments already scheduled     May 16, 2018  3:00 PM CDT   Return Visit with Suhail Segura MD   [facility] ([facility])    [address]  [facility] [id number] [phone]              Further instructions from your care team       SPINE SURGERY DISCHARGE INSTRUCTIONS  DR. BRINDA BARNHART M.D.  [phone]    1.  NO HEAVY LIFITING  - 10 POUNDS UNTIL FOLLOW-UP VISIT IN 6-8 WEEKS.    2.  OK TO REMOVE DRESSING IN 24 HOURS AND SHOWER OR BATHE, BUT DO NOT SCRUB OR SUBMERGE INCISION UNDER WATER FOR 6 WEEKS.  IF YOU HAVE DRAINAGE FROM THE INCISION, YOU MAY REPLACE THE DRESSING AS NEEDED.    3.  OK TO USE ICE PACKS TO THE BACK AREA FOR 20 MINUTES ON AND 20 MINUTES OFF FOR 24-48 HOURS AFTER SURGERY.  AVOID ICE CONTACT DIRECTLY TO THE SKIN OR INCISION.    4.  OK TO WALK AS MUCH AS TOLERATED.    5.  NO CONTACT SPORTS UNTIL FOLLOW-UP VISIT.    6.  NO HIGH IMPACT ACTIVITIES SUCH AS RUNNING/JOGGING, SNOWMOBILE OR FOUR COBOS RIDING OR ANY OTHER RECREACTIONAL VEHICLES.    CALL MY OFFICE [phone] FOR INCREASING REDNESS, SWELLING OR PUS DRAINING FROM THE INCISION.  CALL IF YOU HAVE INCREASED PAIN OR ANY OTHER QUESTIONS OR CONCERNS.    GENERAL ANESTHESIA OR SEDATION ADULT DISCHARGE INSTRUCTIONS   SPECIAL PRECAUTIONS FOR 24 HOURS AFTER SURGERY    IT IS NOT UNUSUAL TO FEEL LIGHT-HEADED OR FAINT, UP TO 24 HOURS AFTER SURGERY OR WHILE TAKING PAIN MEDICATION.  IF YOU HAVE THESE SYMPTOMS; SIT FOR A FEW MINUTES BEFORE STANDING AND HAVE SOMEONE ASSIST YOU WHEN YOU GET UP TO WALK OR USE THE BATHROOM.    YOU SHOULD REST AND RELAX FOR THE NEXT 24 HOURS AND YOU MUST MAKE ARRANGEMENTS TO HAVE SOMEONE STAY WITH YOU FOR AT LEAST 24 HOURS AFTER YOUR DISCHARGE.  AVOID HAZARDOUS AND STRENUOUS ACTIVITIES.  DO NOT MAKE IMPORTANT DECISIONS FOR 24 HOURS.    DO NOT DRIVE ANY VEHICLE OR OPERATE MECHANICAL EQUIPMENT FOR 24 HOURS FOLLOWING THE END OF YOUR SURGERY.  EVEN THOUGH YOU MAY FEEL NORMAL, YOUR REACTIONS MAY BE AFFECTED BY THE MEDICATION YOU HAVE RECEIVED.    DO NOT DRINK ALCOHOLIC  "BEVERAGES FOR 24 HOURS FOLLOWING YOUR SURGERY.    DRINK CLEAR LIQUIDS (APPLE JUICE, GINGER ALE, 7-UP, BROTH, ETC.).  PROGRESS TO YOUR REGULAR DIET AS YOU FEEL ABLE.    YOU MAY HAVE A DRY MOUTH, A SORE THROAT, MUSCLES ACHES OR TROUBLE SLEEPING.  THESE SHOULD GO AWAY AFTER 24 HOURS.    CALL YOUR DOCTOR FOR ANY OF THE FOLLOWING:  SIGNS OF INFECTION (FEVER, GROWING TENDERNESS AT THE SURGERY SITE, A LARGE AMOUNT OF DRAINAGE OR BLEEDING, SEVERE PAIN, FOUL-SMELLING DRAINAGE, REDNESS OR SWELLING.    IT HAS BEEN OVER 8 TO 10 HOURS SINCE SURGERY AND YOU ARE STILL NOT ABLE TO URINATE (PASS WATER).       MEDICATION YOU RECEIVED:   You took 1 Dilaudid (2 mg) tablet at 1:15 PM.    Pending Results     No orders found from 3/31/2018 to 4/3/2018.            Admission Information     Date & Time Provider Department Dept. Phone    2018 Suhail Segura MD [facility] PreOP/PostOP [phone]      Your Vitals Were     Blood Pressure Temperature Respirations Height Weight Pulse Oximetry    158/82 97.9  F (36.6  C) (Temporal) 15 1.829 m (6') 109.3 kg (241 lb) 95%    BMI (Body Mass Index)                   32.69 kg/m2           MyChart Information     [employer] lets you send messages to your doctor, view your test results, renew your prescriptions, schedule appointments and more. To sign up, go to [email or web address] . Click on \"Log in\" on the left side of the screen, which will take you to the Welcome page. Then click on \"Sign up Now\" on the right side of the page.     You will be asked to enter the access code listed below, as well as some personal information. Please follow the directions to create your username and password.     Your access code is: [email or web address]  Expires: 2018  9:39 AM     Your access code will  in 90 days. If you need help or a new code, please call your [place] clinic or [phone].        Care EveryWhere ID     This is your Care EveryWhere ID. This could be used by other " organizations to access your [place] medical records  [id number]        Equal Access to Services     POPEYEVITOR JESSY : Joanna Waldrop, brian belcher, kiara prieto. So [facility] [phone].    ATENCIÓN: Si habla español, tiene a love disposición servicios gratuitos de asistencia lingüística. Garryame al [phone].    We comply with applicable federal civil rights laws and [place] laws. We do not discriminate on the basis of race, color, national origin, age, disability, sex, sexual orientation, or gender identity.               Review of your medicines      START taking        Dose / Directions    diazepam 5 MG tablet   Commonly known as:  VALIUM   Used for:  S/P lumbar microdiscectomy        Dose:  5 mg   Take 1 tablet (5 mg) by mouth every 8 hours as needed for muscle spasms or pain   Quantity:  20 tablet   Refills:  0         CONTINUE these medicines which have NOT CHANGED        Dose / Directions    HYDROmorphone 2 MG tablet   Commonly known as:  DILAUDID   Used for:  Lumbar radiculopathy        Dose:  2-4 mg   Take 1-2 tablets (2-4 mg) by mouth every 4 hours as needed for pain   Quantity:  60 tablet   Refills:  0       levothyroxine 125 MCG tablet   Commonly known as:  SYNTHROID   Used for:  Congenital hypothyroidism without goiter        Dose:  125 mcg   Take 1 tablet (125 mcg) by mouth daily   Quantity:  90 tablet   Refills:  3            Where to get your medicines      Some of these will need a paper prescription and others can be bought over the counter. Ask your nurse if you have questions.     Bring a paper prescription for each of these medications     diazepam 5 MG tablet    HYDROmorphone 2 MG tablet                Protect others around you: Learn how to safely use, store and throw away your medicines at www.disposemymeds.org.        Information about OPIOIDS     PRESCRIPTION OPIOIDS: WHAT YOU NEED TO KNOW    Prescription opioids can  be used to help relieve moderate to severe pain and are often prescribed following a surgery or injury, or for certain health conditions. These medications can be an important part of treatment but also come with serious risks. It is important to work with your health care provider to make sure you are getting the safest, most effective care.    WHAT ARE THE RISKS AND SIDE EFFECTS OF OPIOID USE?  Prescription opioids carry serious risks of addiction and overdose, especially with prolonged use. An opioid overdose, often marked by slowed breathing can cause sudden death. The use of prescription opioids can have a number of side effects as well, even when taken as directed:      Tolerance - meaning you might need to take more of a medication for the same pain relief    Physical dependence - meaning you have symptoms of withdrawal when a medication is stopped    Increased sensitivity to pain    Constipation    Nausea, vomiting, and dry mouth    Sleepiness and dizziness    Confusion    Depression    Low levels of testosterone that can result in lower sex drive, energy, and strength    Itching and sweating    RISKS ARE GREATER WITH:    History of drug misuse, substance use disorder, or overdose    Mental health conditions (such as depression or anxiety)    Sleep apnea    Older age (65 years or older)    Pregnancy    Avoid alcohol while taking prescription opioids.   Also, unless specifically advised by your health care provider, medications to avoid include:    Benzodiazepines (such as Xanax or Valium)    Muscle relaxants (such as Soma or Flexeril)    Hypnotics (such as Ambien or Lunesta)    Other prescription opioids    KNOW YOUR OPTIONS:  Talk to your health care provider about ways to manage your pain that do not involve prescription opioids. Some of these options may actually work better and have fewer risks and side effects:    Pain relievers such as acetaminophen, ibuprofen, and naproxen    Some medications that are  also used for depression or seizures    Physical therapy and exercise    Cognitive behavioral therapy, a psychological, goal-directed approach, in which patients learn how to modify physical, behavioral, and emotional triggers of pain and stress    IF YOU ARE PRESCRIBED OPIOIDS FOR PAIN:    Never take opioids in greater amounts or more often than prescribed    Follow up with your primary health care provider and work together to create a plan on how to manage your pain.    Talk about ways to help manage your pain that do not involve prescription opioids    Talk about all concerns and side effects    Help prevent misuse and abuse    Never sell or share prescription opioids    Never use another person's prescription opioids    Store prescription opioids in a secure place and out of reach of others (this may include visitors, children, friends, and family)    Visit www.cdc.gov/drugoverdose to learn about risks of opioid abuse and overdose    If you believe you may be struggling with addiction, tell your health care provider and ask for guidance or call [facility]'s National Helpline at 3-990-423-HELP    LEARN MORE / www.cdc.gov/drugoverdose/prescribing/guideline.html    Safely dispose of unused prescription opioids: Find your local drug take-back programs and more information about the importance of safe disposal at www.doseofreality.mn.gov             Medication List: This is a list of all your medications and when to take them. Check marks below indicate your daily home schedule. Keep this list as a reference.      Medications           Morning Afternoon Evening Bedtime As Needed    diazepam 5 MG tablet   Commonly known as:  VALIUM   Take 1 tablet (5 mg) by mouth every 8 hours as needed for muscle spasms or pain                                HYDROmorphone 2 MG tablet   Commonly known as:  DILAUDID   Take 1-2 tablets (2-4 mg) by mouth every 4 hours as needed for pain   Last time this was given:  2 mg on 4/2/2018  1:13 PM                                 levothyroxine 125 MCG tablet   Commonly known as:  SYNTHROID   Take 1 tablet (125 mcg) by mouth daily

## 2018-04-06 ENCOUNTER — TELEPHONE (OUTPATIENT)
Dept: NEUROSURGERY | Facility: CLINIC | Age: 43
End: 2018-04-06

## 2018-04-06 NOTE — TELEPHONE ENCOUNTER
Called and spoke with patient. Patient is S/P Microdiscectomy on 4/2/18. After surgery patient is doing well but reports some incisional pain and general soreness. Patient has been eating and drinking well. Patient has been urinating with no issues or concerns and advised patient to increase fluid, fiber intake,OTC stool softener . No issues or concerns with incision. Reminded patient to watch for s/sx of infection, reviewed incision care and post op restrictions. Pain rating 3/10. Pain is controlled by dilaudid and valium. Patient did state he is weaning off the pain medications and taking OTC tylenol. Reminded patient to schedule 6 week post op visit. All questions were answered to the best of my ability and the patient's satisfaction. Patient advised to call with any additional questions or concerns.

## 2018-04-27 ENCOUNTER — DOCUMENTATION ONLY (OUTPATIENT)
Dept: NEUROSURGERY | Facility: CLINIC | Age: 43
End: 2018-04-27

## 2018-04-27 NOTE — PROGRESS NOTES
4/27/2018    FLMA Forms: yes    Faxed to: [phone] mariola Adam      Type of form: Family & Medical leave act (FMLA) fitness for duty certification     Placed a copy in the bin and sent the original to medical records

## 2018-05-16 ENCOUNTER — OFFICE VISIT (OUTPATIENT)
Dept: NEUROSURGERY | Facility: CLINIC | Age: 43
End: 2018-05-16
Attending: NEUROLOGICAL SURGERY
Payer: COMMERCIAL

## 2018-05-16 VITALS
OXYGEN SATURATION: 95 % | HEIGHT: 72 IN | BODY MASS INDEX: 33.32 KG/M2 | SYSTOLIC BLOOD PRESSURE: 135 MMHG | DIASTOLIC BLOOD PRESSURE: 87 MMHG | HEART RATE: 76 BPM | WEIGHT: 246 LBS

## 2018-05-16 DIAGNOSIS — Z98.890 STATUS POST LUMBAR MICRODISCECTOMY: Primary | ICD-10-CM

## 2018-05-16 PROCEDURE — 99024 POSTOP FOLLOW-UP VISIT: CPT | Performed by: NURSE PRACTITIONER

## 2018-05-16 PROCEDURE — G0463 HOSPITAL OUTPT CLINIC VISIT: HCPCS | Performed by: NURSE PRACTITIONER

## 2018-05-16 ASSESSMENT — PAIN SCALES - GENERAL: PAINLEVEL: NO PAIN (1)

## 2018-05-16 NOTE — MR AVS SNAPSHOT
"              After Visit Summary   5/16/2018    Kodak Crowe    MRN: [id number]           Patient Information     Date Of Birth          1975        Visit Information        Provider Department      5/16/2018 1:50 PM Madelyn Hoffmann APRN CNP [facility]        Today's Diagnoses     Status post lumbar microdiscectomy    -  1      Care Instructions    - May increase lifting restriction to 20 pounds until 12 weeks then may increase to 30 pounds and increase as tolerated from there.  May start bending and twisting at 12 weeks.     - followup in as needed     - Call the clinic at [phone] for increased pain or any other questions and concerns.    Patient to follow up with Primary Care provider regarding elevated blood pressure.              Follow-ups after your visit        Who to contact     If you have questions or need follow up information about today's clinic visit or your schedule please contact [facility] directly at [phone].  Normal or non-critical lab and imaging results will be communicated to you by hoozinhart, letter or phone within 4 business days after the clinic has received the results. If you do not hear from us within 7 days, please contact the clinic through hoozinhart or phone. If you have a critical or abnormal lab result, we will notify you by phone as soon as possible.  Submit refill requests through [employer] or call your pharmacy and they will forward the refill request to us. Please allow 3 business days for your refill to be completed.          Additional Information About Your Visit        [employer] Information     [employer] lets you send messages to your doctor, view your test results, renew your prescriptions, schedule appointments and more. To sign up, go to [email or web address] . Click on \"Log in\" on the left side of the screen, which will take you to the Welcome page. Then click on \"Sign up Now\" on the right side of the page.     You " will be asked to enter the access code listed below, as well as some personal information. Please follow the directions to create your username and password.     Your access code is: [email or web address]  Expires: 2018  9:39 AM     Your access code will  in 90 days. If you need help or a new code, please call your [place] clinic or [phone].        Care EveryWhere ID     This is your Care EveryWhere ID. This could be used by other organizations to access your [place] medical records  [id number]        Your Vitals Were     Pulse Height Pulse Oximetry BMI (Body Mass Index)          76 6' (1.829 m) 95% 33.36 kg/m2         Blood Pressure from Last 3 Encounters:   18 (!) 144/93   18 129/72   18 136/86    Weight from Last 3 Encounters:   18 246 lb (111.6 kg)   18 241 lb (109.3 kg)   18 246 lb (111.6 kg)              Today, you had the following     No orders found for display         Today's Medication Changes          These changes are accurate as of 18  2:16 PM.  If you have any questions, ask your nurse or doctor.               Stop taking these medicines if you haven't already. Please contact your care team if you have questions.     diazepam 5 MG tablet   Commonly known as:  VALIUM   Stopped by:  Madelyn Hoffmann APRN CNP           HYDROmorphone 2 MG tablet   Commonly known as:  DILAUDID   Stopped by:  Madelyn Hoffmann APRN CNP                    Primary Care Provider Office Phone # Fax #    Buddy Lior Gleason PA-C [phone] [address] [facility] [facility] [address]        Equal Access to Services     [facility] Rivera Medical CenterBERTHA AH: Hadbonny Waldrop, waivaniada luqadaha, qaybta kaalmada moira, kiara ulloa. So [facility] [phone].    ATENCIÓN: Si habla español, tiene a love disposición servicios gratuitos de asistencia lingüística. Llame al [phone].    We comply with applicable federal civil rights laws and  [place] laws. We do not discriminate on the basis of race, color, national origin, age, disability, sex, sexual orientation, or gender identity.            Thank you!     Thank you for choosing [facility]  for your care. Our goal is always to provide you with excellent care. Hearing back from our patients is one way we can continue to improve our services. Please take a few minutes to complete the written survey that you may receive in the mail after your visit with us. Thank you!             Your Updated Medication List - Protect others around you: Learn how to safely use, store and throw away your medicines at www.disposemymeds.org.          This list is accurate as of 5/16/18  2:16 PM.  Always use your most recent med list.                   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

## 2018-05-16 NOTE — LETTER
5/16/2018         RE: Kodak Crowe  [address]  [place] [address]        Dear Colleague,    Thank you for referring your patient, Kodak Crowe, to the [facility]. Please see a copy of my visit note below.    [facility] Clinic  Neurosurgery followup:    HPI: Mr. Crowe is a 43 year old male that returns about 6 weeks post right L4-5 microdiscectomy.  Prior to surgery he had severe low back and right leg pain. He states that now the leg pain has resolved.  He notes that his pain is 1/10. He feels that he is getting fat due to being inactive.       Exam:  Constitutional:  Alert, well nourished, NAD.  HEENT: Normocephalic, atraumatic.   Pulm:  Without shortness of breath   CV:  No pitting edema of BLE.      Neurological:  Awake  Alert  Oriented x 3  Motor exam:        IP Q DF PF EHL  R   5  5   5   5    5  L   5  5   5   5    5     Able to spontaneously move L/E bilaterally  Sensation intact throughout all L/E dermatomes     Incisions:  Lumbar incision healed       A/P: Mr. Crowe is a 43 year old male that returns about 6 weeks post right L4-5 microdiscectomy.  Prior to surgery he had severe low back and right leg pain. He states that now the leg pain has resolved.  He notes that his pain is 1/10. He feels that he is getting fat due to being inactive.  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  We will have him return as needed.       Patient Instructions   - May increase lifting restriction to 20 pounds until 12 weeks then may increase to 30 pounds and increase as tolerated from there.  May start bending and twisting at 12 weeks.     - followup in as needed     - Call the clinic at [phone] for increased pain or any other questions and concerns.         Madelyn Hoffmann [facility]  [facility]  [address]  [address] [place] [address]    Tel [phone]  Pager  [phone]        Again, thank you for allowing me to participate in the care of your patient.        Sincerely,        EUSEBIO Pradhan CNP

## 2018-05-16 NOTE — PATIENT INSTRUCTIONS
- May increase lifting restriction to 20 pounds until 12 weeks then may increase to 30 pounds and increase as tolerated from there.  May start bending and twisting at 12 weeks.     - followup in as needed     - Call the clinic at [phone] for increased pain or any other questions and concerns.    Patient to follow up with Primary Care provider regarding elevated blood pressure.

## 2018-05-16 NOTE — NURSING NOTE
Kodak Crowe is a 43 year old male who presents for:  Chief Complaint   Patient presents with     Neurologic Problem     6 week follow up status post lumbar microdiscectomy DOS 04/02/2018        Vitals:    Vitals:    05/16/18 1402   BP: (!) 144/93   BP Location: Right arm   Patient Position: Sitting   Cuff Size: Adult Large   Pulse: 76   SpO2: 95%   Weight: 246 lb (111.6 kg)   Height: 6' (1.829 m)       BMI:  Estimated body mass index is 33.36 kg/(m^2) as calculated from the following:    Height as of this encounter: 6' (1.829 m).    Weight as of this encounter: 246 lb (111.6 kg).    Pain Score:  No Pain (1)      Do you feel safe in your environment?  Yes      Lorna Shankar

## 2018-05-16 NOTE — PROGRESS NOTES
Spine and Brain Clinic  Neurosurgery followup:    HPI: Mr. Crowe is a 43 year old male that returns about 6 weeks post right L4-5 microdiscectomy.  Prior to surgery he had severe low back and right leg pain. He states that now the leg pain has resolved.  He notes that his pain is 1/10. He feels that he is getting fat due to being inactive.       Exam:  Constitutional:  Alert, well nourished, NAD.  HEENT: Normocephalic, atraumatic.   Pulm:  Without shortness of breath   CV:  No pitting edema of BLE.      Neurological:  Awake  Alert  Oriented x 3  Motor exam:        IP Q DF PF EHL  R   5  5   5   5    5  L   5  5   5   5    5     Able to spontaneously move L/E bilaterally  Sensation intact throughout all L/E dermatomes     Incisions:  Lumbar incision healed       A/P: Mr. Crowe is a 43 year old male that returns about 6 weeks post right L4-5 microdiscectomy.  Prior to surgery he had severe low back and right leg pain. He states that now the leg pain has resolved.  He notes that his pain is 1/10. He feels that he is getting fat due to being inactive.  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  We will have him return as needed.       Patient Instructions   - May increase lifting restriction to 20 pounds until 12 weeks then may increase to 30 pounds and increase as tolerated from there.  May start bending and twisting at 12 weeks.     - followup in as needed     - Call the clinic at [phone] for increased pain or any other questions and concerns.         Madelyn Hoffmann [facility]  [facility]ine [facility] Clinic  [address] [address]    Tel [phone]  Pager [phone]

## 2018-06-15 ENCOUNTER — OFFICE VISIT (OUTPATIENT)
Dept: FAMILY MEDICINE | Facility: CLINIC | Age: 43
End: 2018-06-15
Payer: COMMERCIAL

## 2018-06-15 VITALS
DIASTOLIC BLOOD PRESSURE: 77 MMHG | SYSTOLIC BLOOD PRESSURE: 132 MMHG | RESPIRATION RATE: 14 BRPM | WEIGHT: 251 LBS | HEART RATE: 68 BPM | TEMPERATURE: 98 F | BODY MASS INDEX: 34.04 KG/M2

## 2018-06-15 DIAGNOSIS — Z00.00 ROUTINE GENERAL MEDICAL EXAMINATION AT A HEALTH CARE FACILITY: Primary | ICD-10-CM

## 2018-06-15 DIAGNOSIS — E03.1 CONGENITAL HYPOTHYROIDISM WITHOUT GOITER: ICD-10-CM

## 2018-06-15 LAB — TSH SERPL DL<=0.005 MIU/L-ACNC: 1.5 MU/L (ref 0.4–4)

## 2018-06-15 PROCEDURE — 84443 ASSAY THYROID STIM HORMONE: CPT | Performed by: PHYSICIAN ASSISTANT

## 2018-06-15 PROCEDURE — 36415 COLL VENOUS BLD VENIPUNCTURE: CPT | Performed by: PHYSICIAN ASSISTANT

## 2018-06-15 PROCEDURE — 99396 PREV VISIT EST AGE 40-64: CPT | Performed by: PHYSICIAN ASSISTANT

## 2018-06-15 RX ORDER — LEVOTHYROXINE SODIUM 125 UG/1
125 TABLET ORAL DAILY
Qty: 90 TABLET | Refills: 3 | Status: SHIPPED | OUTPATIENT
Start: 2018-06-15 | End: 2019-06-06

## 2018-06-15 NOTE — MR AVS SNAPSHOT
After Visit Summary   6/15/2018    Kodak Crowe    MRN: [id number]           Patient Information     Date Of Birth          1975        Visit Information        Provider Department      6/15/2018 2:00 PM Buddy Gleason PA-C [facility]        Today's Diagnoses     Routine general medical examination at a health care facility    -  1    Congenital hypothyroidism without goiter          Care Instructions      Preventive Health Recommendations  Male Ages 40 to 49    Yearly exam:             See your health care provider every year in order to  o   Review health changes.   o   Discuss preventive care.    o   Review your medicines if your doctor has prescribed any.    You should be tested each year for STDs (sexually transmitted diseases) if you re at risk.     Have a cholesterol test every 5 years.     Have a colonoscopy (test for colon cancer) if someone in your family has had colon cancer or polyps before age 50.     After age 45, have a diabetes test (fasting glucose). If you are at risk for diabetes, you should have this test every 3 years.      Talk with your health care provider about whether or not a prostate cancer screening test (PSA) is right for you.    Shots: Get a flu shot each year. Get a tetanus shot every 10 years.     Nutrition:    Eat at least 5 servings of fruits and vegetables daily.     Eat whole-grain bread, whole-wheat pasta and brown rice instead of white grains and rice.     Talk to your provider about Calcium and Vitamin D.     Lifestyle    Exercise for at least 150 minutes a week (30 minutes a day, 5 days a week). This will help you control your weight and prevent disease.     Limit alcohol to one drink per day.     No smoking.     Wear sunscreen to prevent skin cancer.     See your dentist every six months for an exam and cleaning.              Follow-ups after your visit        Who to contact     If you have questions or need follow up information  about today's clinic visit or your schedule please contact [facility] directly at [phone].  Normal or non-critical lab and imaging results will be communicated to you by MyChart, letter or phone within 4 business days after the clinic has received the results. If you do not hear from us within 7 days, please contact the clinic through MyChart or phone. If you have a critical or abnormal lab result, we will notify you by phone as soon as possible.  Submit refill requests through [employer] or call your pharmacy and they will forward the refill request to us. Please allow 3 business days for your refill to be completed.          Additional Information About Your Visit        Care EveryWhere ID     This is your Care EveryWhere ID. This could be used by other organizations to access your [facility] medical records  [id number]        Your Vitals Were     Pulse Temperature Respirations BMI (Body Mass Index)          68 98  F (36.7  C) (Oral) 14 34.04 kg/m2         Blood Pressure from Last 3 Encounters:   06/15/18 132/77   05/16/18 135/87   04/02/18 129/72    Weight from Last 3 Encounters:   06/15/18 251 lb (113.9 kg)   05/16/18 246 lb (111.6 kg)   04/02/18 241 lb (109.3 kg)              We Performed the Following     TSH with free T4 reflex          Where to get your medicines      These medications were sent to [facility] [id number] [facility] - [place], [place] - [address]  [facility]  [address], [facility] [address]     Phone:  [phone]     levothyroxine 125 MCG tablet          Primary Care Provider Office Phone # Fax #    Buddy Lior Gleason PA-C [phone]       [address] [facility] [address]        Equal Access to Services     SANDRA JIMÉNEZ : Joanna Waldrop, brian belcher, kiara prieto. So [facility] [phone].    ATENCIÓN: Si habla español, tiene a love disposición servicios gratuitos de asistencia  lingüística. Isa al [phone].    We comply with applicable federal civil rights laws and [place] laws. We do not discriminate on the basis of race, color, national origin, age, disability, sex, sexual orientation, or gender identity.            Thank you!     Thank you for choosing [facility]  for your care. Our goal is always to provide you with excellent care. Hearing back from our patients is one way we can continue to improve our services. Please take a few minutes to complete the written survey that you may receive in the mail after your visit with us. Thank you!             Your Updated Medication List - Protect others around you: Learn how to safely use, store and throw away your medicines at www.disposemymeds.org.          This list is accurate as of 6/15/18  2:31 PM.  Always use your most recent med list.                   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

## 2018-06-15 NOTE — LETTER
June 18, 2018      Kodak STONE [place]  [address] [address]        Dear ,    We are writing to inform you of your test results.    Your test results fall within the expected range(s) or remain unchanged from previous results.  Please continue with current treatment plan.    Resulted Orders   TSH with free T4 reflex   Result Value Ref Range    TSH 1.50 0.40 - 4.00 mU/L       If you have any questions or concerns, please call the clinic at the number listed above.       Sincerely,        Buddy Gleason PA-C/AL

## 2018-06-15 NOTE — PROGRESS NOTES
SUBJECTIVE:   CC: Kodak Crowe is an 43 year old male who presents for preventative health visit.     Physical   Annual:     Getting at least 3 servings of Calcium per day::  Yes    Bi-annual eye exam::  Yes    Dental care twice a year::  Yes    Sleep apnea or symptoms of sleep apnea::  None    Diet::  Carbohydrate counting    Frequency of exercise::  6-7 days/week    Duration of exercise::  45-60 minutes    Taking medications regularly::  Yes    Medication side effects::  None    Additional concerns today::  No            Hypothyroidism Follow-up      Since last visit, patient describes the following symptoms: Weight stable, no hair loss, no skin changes, no constipation, no loose stools        Problems taking medications regularly: No    Medication side effects: none             Today's PHQ-2 Score:   PHQ-2 ( 1999 [employer]) 6/15/2018   Q1: Little interest or pleasure in doing things 0   Q2: Feeling down, depressed or hopeless 0   PHQ-2 Score 0   Q1: Little interest or pleasure in doing things Not at all   Q2: Feeling down, depressed or hopeless Not at all   PHQ-2 Score 0       Abuse: Current or Past(Physical, Sexual or Emotional)- No  Do you feel safe in your environment - Yes    Social History   Substance Use Topics     Smoking status: Never Smoker     Smokeless tobacco: Never Used     Alcohol use 0.0 oz/week     0 Standard drinks or equivalent per week      Comment: occasional     Alcohol Use 6/15/2018   If you drink alcohol do you typically have greater than 3 drinks per day OR greater than 7 drinks per week? No       Last PSA:   PSA   Date Value Ref Range Status   04/09/2010 0.30 0 - 4 ug/L Final       Reviewed orders with patient. Reviewed health maintenance and updated orders accordingly - Yes  BP Readings from Last 3 Encounters:   06/15/18 132/77   05/16/18 135/87   04/02/18 129/72    Wt Readings from Last 3 Encounters:   06/15/18 251 lb (113.9 kg)   05/16/18 246 lb (111.6 kg)   04/02/18 241 lb (109.3 kg)                   Patient Active Problem List   Diagnosis     Family history of hemochromatosis     Hypothyroid     Hyperlipidemia with target LDL less than 160     Lumbar radiculopathy     Past Surgical History:   Procedure Laterality Date     DISCECTOMY LUMBAR POSTERIOR MICROSCOPIC ONE LEVEL Right 4/2/2018    Procedure: DISCECTOMY LUMBAR POSTERIOR MICROSCOPIC ONE LEVEL;  Right L4-5 hemilaminectomy, medial facetectomy, foraminotomy with microdiscectomy ;  Surgeon: Suhail Segura MD;  Location: [facility] OR     ENT SURGERY       VASECTOMY         Social History   Substance Use Topics     Smoking status: Never Smoker     Smokeless tobacco: Never Used     Alcohol use 0.0 oz/week     0 Standard drinks or equivalent per week      Comment: occasional     Family History   Problem Relation Age of Onset     Obesity Mother      Hypertension Mother      Eye Disorder Mother      Nely's disease     Blood Disease Father      hematomacrosis     DIABETES Paternal Grandfather      Lipids Brother      Blood Disease Brother      hematomacrosis         Current Outpatient Prescriptions   Medication Sig Dispense Refill     levothyroxine (SYNTHROID) 125 MCG tablet Take 1 tablet (125 mcg) by mouth daily 90 tablet 3     [DISCONTINUED] levothyroxine (SYNTHROID) 125 MCG tablet Take 1 tablet (125 mcg) by mouth daily 90 tablet 3     Allergies   Allergen Reactions     No Known Drug Allergies        Reviewed and updated as needed this visit by clinical staff  Tobacco  Allergies  Meds  Problems  Med Hx  Surg Hx  Fam Hx  Soc Hx          Reviewed and updated as needed this visit by Provider  Allergies  Meds  Problems        Past Medical History:   Diagnosis Date     Tourette's disorder 1986      Past Surgical History:   Procedure Laterality Date     DISCECTOMY LUMBAR POSTERIOR MICROSCOPIC ONE LEVEL Right 4/2/2018    Procedure: DISCECTOMY LUMBAR POSTERIOR MICROSCOPIC ONE LEVEL;  Right L4-5 hemilaminectomy, medial facetectomy,  foraminotomy with microdiscectomy ;  Surgeon: Suhail Segura MD;  Location: [facility] OR     ENT SURGERY       VASECTOMY         Review of Systems  CONSTITUTIONAL: NEGATIVE for fever, chills, change in weight  INTEGUMENTARY/SKIN: NEGATIVE for worrisome rashes, moles or lesions  EYES: NEGATIVE for vision changes or irritation  ENT: NEGATIVE for ear, mouth and throat problems  RESP: NEGATIVE for significant cough or SOB  CV: NEGATIVE for chest pain, palpitations or peripheral edema  GI: NEGATIVE for nausea, abdominal pain, heartburn, or change in bowel habits   male: negative for dysuria, hematuria, decreased urinary stream, erectile dysfunction, urethral discharge  MUSCULOSKELETAL: NEGATIVE for significant arthralgias or myalgia  NEURO: NEGATIVE for weakness, dizziness or paresthesias  PSYCHIATRIC: NEGATIVE for changes in mood or affect    OBJECTIVE:   /77 (BP Location: Right arm, Patient Position: Chair, Cuff Size: Adult Large)  Pulse 68  Temp 98  F (36.7  C) (Oral)  Resp 14  Wt 251 lb (113.9 kg)  BMI 34.04 kg/m2    Physical Exam  GENERAL: healthy, alert and no distress  EYES: Eyes grossly normal to inspection, PERRL and conjunctivae and sclerae normal  HENT: ear canals and TM's normal, nose and mouth without ulcers or lesions  NECK: no adenopathy, no asymmetry, masses, or scars and thyroid normal to palpation  RESP: lungs clear to auscultation - no rales, rhonchi or wheezes  CV: regular rate and rhythm, normal S1 S2, no S3 or S4, no murmur, click or rub, no peripheral edema and peripheral pulses strong  ABDOMEN: soft, nontender, no hepatosplenomegaly, no masses and bowel sounds normal   (male): normal male genitalia without lesions or urethral discharge, no hernia  MS: no gross musculoskeletal defects noted, no edema  SKIN: no suspicious lesions or rashes  NEURO: Normal strength and tone, mentation intact and speech normal  PSYCH: mentation appears normal, affect normal/bright  LYMPH: normal  ant/post cervical, supraclavicular nodes    ASSESSMENT/PLAN:   (Z00.00) Routine general medical examination at a health care facility  (primary encounter diagnosis)  Comment: not fasting today. Will postpone labs until next year.   Plan:     (E03.1) Congenital hypothyroidism without goiter  Comment: stable yearly follow ups.   Plan: levothyroxine (SYNTHROID) 125 MCG tablet, TSH         with free T4 reflex        -Medication use and side effects discussed with the patient. Patient is in complete understanding and agreement with plan.         COUNSELING:   Reviewed preventive health counseling, as reflected in patient instructions       Regular exercise       Healthy diet/nutrition    BP Screening:   Last 3 BP Readings:    BP Readings from Last 3 Encounters:   06/15/18 132/77   05/16/18 135/87   04/02/18 129/72       The following was recommended to the patient:  Re-screen BP within a year and recommended lifestyle modifications     reports that he has never smoked. He has never used smokeless tobacco.    Estimated body mass index is 34.04 kg/(m^2) as calculated from the following:    Height as of 5/16/18: 6' (1.829 m).    Weight as of this encounter: 251 lb (113.9 kg).   Weight management plan: Discussed healthy diet and exercise guidelines and patient will follow up in 12 months in clinic to re-evaluate.    Counseling Resources:  ATP IV Guidelines  Pooled Cohorts Equation Calculator  FRAX Risk Assessment  ICSI Preventive Guidelines  Dietary Guidelines for Americans, 2010  USDA's MyPlate  ASA Prophylaxis  Lung CA Screening    Buddy Gleason PA-C  [facility]  Answers for HPI/ROS submitted by the patient on 6/15/2018   PHQ-2 Score: 0

## 2019-06-06 DIAGNOSIS — E03.1 CONGENITAL HYPOTHYROIDISM WITHOUT GOITER: ICD-10-CM

## 2019-06-06 RX ORDER — LEVOTHYROXINE SODIUM 125 UG/1
TABLET ORAL
Qty: 30 TABLET | Refills: 0 | Status: SHIPPED | OUTPATIENT
Start: 2019-06-06 | End: 2019-06-27

## 2019-06-06 NOTE — TELEPHONE ENCOUNTER
Synthroid  Medication is being filled for 1 time refill only due to:  Patient needs to be seen because it has been more than one year since last visit due 6/15/19    Roseanne Concepcion, RN, BSN

## 2019-06-26 ENCOUNTER — OFFICE VISIT (OUTPATIENT)
Dept: FAMILY MEDICINE | Facility: CLINIC | Age: 44
End: 2019-06-26
Payer: COMMERCIAL

## 2019-06-26 VITALS
HEIGHT: 72 IN | HEART RATE: 81 BPM | RESPIRATION RATE: 14 BRPM | SYSTOLIC BLOOD PRESSURE: 125 MMHG | WEIGHT: 253.3 LBS | OXYGEN SATURATION: 98 % | DIASTOLIC BLOOD PRESSURE: 84 MMHG | BODY MASS INDEX: 34.31 KG/M2 | TEMPERATURE: 98 F

## 2019-06-26 DIAGNOSIS — M21.611 BUNION, RIGHT: ICD-10-CM

## 2019-06-26 DIAGNOSIS — E03.1 CONGENITAL HYPOTHYROIDISM WITHOUT GOITER: Primary | ICD-10-CM

## 2019-06-26 DIAGNOSIS — E66.811 CLASS 1 OBESITY WITHOUT SERIOUS COMORBIDITY WITH BODY MASS INDEX (BMI) OF 34.0 TO 34.9 IN ADULT, UNSPECIFIED OBESITY TYPE: ICD-10-CM

## 2019-06-26 LAB — TSH SERPL DL<=0.005 MIU/L-ACNC: 2.14 MU/L (ref 0.4–4)

## 2019-06-26 PROCEDURE — 84443 ASSAY THYROID STIM HORMONE: CPT | Performed by: PHYSICIAN ASSISTANT

## 2019-06-26 PROCEDURE — 36415 COLL VENOUS BLD VENIPUNCTURE: CPT | Performed by: PHYSICIAN ASSISTANT

## 2019-06-26 PROCEDURE — 99214 OFFICE O/P EST MOD 30 MIN: CPT | Performed by: PHYSICIAN ASSISTANT

## 2019-06-26 RX ORDER — LEVOTHYROXINE SODIUM 125 UG/1
125 TABLET ORAL DAILY
Qty: 30 TABLET | Refills: 0 | Status: CANCELLED | OUTPATIENT
Start: 2019-06-26

## 2019-06-26 ASSESSMENT — MIFFLIN-ST. JEOR: SCORE: 2081.96

## 2019-06-26 ASSESSMENT — PATIENT HEALTH QUESTIONNAIRE - PHQ9
SUM OF ALL RESPONSES TO PHQ QUESTIONS 1-9: 0
SUM OF ALL RESPONSES TO PHQ QUESTIONS 1-9: 0
10. IF YOU CHECKED OFF ANY PROBLEMS, HOW DIFFICULT HAVE THESE PROBLEMS MADE IT FOR YOU TO DO YOUR WORK, TAKE CARE OF THINGS AT HOME, OR GET ALONG WITH OTHER PEOPLE: NOT DIFFICULT AT ALL

## 2019-06-26 NOTE — PROGRESS NOTES
Subjective     Kodak Crowe is a 44 year old male who presents to clinic today for the following health issues:    History of Present Illness        He eats 2-3 servings of fruits and vegetables daily.He consumes 1 sweetened beverage(s) daily.  He is taking medications regularly.     Medication Followup of levothyroxine    Taking Medication as prescribed: yes    Side Effects:  None    Medication Helping Symptoms:  Pt says not sure     Hypothyroidism Follow-up      Since last visit, patient describes the following symptoms: Weight stable, no hair loss, no skin changes, no constipation, no loose stools    Joint Pain    Onset: 1 month    Description:   Location: right foot  Character: Dull ache    Intensity: moderate    Progression of Symptoms: same    Accompanying Signs & Symptoms:  Other symptoms: thickened skin. Worse with walking and eliptical     History:   Previous similar pain: no       Precipitating factors:   Trauma or overuse: no-however states had joiner fracture in the past requiring surgery.     Alleviating factors:  Improved by: rest and salicylic acid    Therapies Tried and outcome: as above. Continues to return.          Patient Active Problem List   Diagnosis     Family history of hemochromatosis     Hypothyroid     Hyperlipidemia with target LDL less than 160     Lumbar radiculopathy     Past Surgical History:   Procedure Laterality Date     DISCECTOMY LUMBAR POSTERIOR MICROSCOPIC ONE LEVEL Right 4/2/2018    Procedure: DISCECTOMY LUMBAR POSTERIOR MICROSCOPIC ONE LEVEL;  Right L4-5 hemilaminectomy, medial facetectomy, foraminotomy with microdiscectomy ;  Surgeon: Suhail Segura MD;  Location: [facility] OR     ENT SURGERY       VASECTOMY         Social History     Tobacco Use     Smoking status: Never Smoker     Smokeless tobacco: Never Used   Substance Use Topics     Alcohol use: Yes     Alcohol/week: 0.0 oz     Comment: occasional     Family History   Problem Relation Age of Onset     Obesity  Mother      Hypertension Mother      Eye Disorder Mother         [place]'s disease     Blood Disease Father         hematomacrosis     Diabetes Paternal Grandfather      Lipids Brother      Blood Disease Brother         hematomacrosis         Current Outpatient Medications   Medication Sig Dispense Refill     levothyroxine (SYNTHROID/LEVOTHROID) 125 MCG tablet TAKE 1 TABLET BY MOUTH EVERY DAY 30 tablet 0     Allergies   Allergen Reactions     No Known Drug Allergies      Recent Labs   Lab Test 06/15/18  1433 06/13/17  1409 03/11/16  0935  02/07/14  1505 08/12/13  0823   A1C  --   --   --   --  5.5 5.4   LDL  --   --  107*  --  138* 115   HDL  --   --  54  --  48 37*   TRIG  --   --  62  --  91 90   ALT  --   --  55  --  44 51   CR  --   --  0.98  --  1.23 1.03   GFRESTIMATED  --   --  84  --  66 81   GFRESTBLACK  --   --  >90  African American GFR Calc    --  80 >90   POTASSIUM  --   --  4.6  --  4.1 4.6   TSH 1.50 1.83 2.05   < > 3.05 5.22*    < > = values in this interval not displayed.      BP Readings from Last 3 Encounters:   06/26/19 125/84   06/15/18 132/77   05/16/18 135/87    Wt Readings from Last 3 Encounters:   06/26/19 114.9 kg (253 lb 4.8 oz)   06/15/18 113.9 kg (251 lb)   05/16/18 111.6 kg (246 lb)                  Reviewed and updated as needed this visit by Provider  Tobacco  Allergies  Meds  Problems  Med Hx  Surg Hx  Fam Hx         Review of Systems   ROS COMP: Constitutional, HEENT, cardiovascular, pulmonary, GI, , musculoskeletal, neuro, skin, endocrine and psych systems are negative, except as otherwise noted.      Objective    /84 (BP Location: Right arm, Patient Position: Chair, Cuff Size: Adult Large)   Pulse 81   Temp 98  F (36.7  C) (Oral)   Resp 14   Ht 1.829 m (6')   Wt 114.9 kg (253 lb 4.8 oz)   SpO2 98%   BMI 34.35 kg/m    Body mass index is 34.35 kg/m .  Physical Exam   GENERAL: alert, no distress and obese  RESP: lungs clear to auscultation - no rales, rhonchi or  wheezes  CV: regular rates and rhythm, normal S1 S2, no S3 or S4 and no murmur, click or rub  MS: bunion like deformity at proximal right 5th metatarsal head with corn present.   PSYCH: mentation appears normal, affect normal/bright    Diagnostic Test Results:  none         Assessment & Plan     (E03.1) Congenital hypothyroidism without goiter  (primary encounter diagnosis)  Comment: stable symptoms. Will check tsh and if normal, continue current prescription.   Plan: TSH with free T4 reflex            (M21.611) Bunion, right  Comment: present on exam. Atypical location. Possibly related to past surgery. Will have see dr. schwab of podiatry for consult.   Plan:     (E66.9,  Z68.34) Class 1 obesity without serious comorbidity with body mass index (BMI) of 34.0 to 34.9 in adult, unspecified obesity type  Comment: ongoing despite exercise changes. Will have consult with weight .   Plan: HEALTH  REFERRAL               BMI:   Estimated body mass index is 34.35 kg/m  as calculated from the following:    Height as of this encounter: 1.829 m (6').    Weight as of this encounter: 114.9 kg (253 lb 4.8 oz).   Weight management plan: Patient referred to endocrine and/or weight management specialty Discussed healthy diet and exercise guidelines        Follow up: as above     Return in about 1 year (around 6/26/2020) for Routine Visit, Lab Work, Physical Exam.    Buddy Gleason PA-C  [facility]

## 2019-06-27 DIAGNOSIS — E03.1 CONGENITAL HYPOTHYROIDISM WITHOUT GOITER: ICD-10-CM

## 2019-06-27 RX ORDER — LEVOTHYROXINE SODIUM 125 UG/1
125 TABLET ORAL DAILY
Qty: 90 TABLET | Refills: 3 | Status: SHIPPED | OUTPATIENT
Start: 2019-06-27 | End: 2020-07-06

## 2019-06-27 RX ORDER — LEVOTHYROXINE SODIUM 125 UG/1
125 TABLET ORAL DAILY
Qty: 90 TABLET | Refills: 3 | OUTPATIENT
Start: 2019-06-27

## 2019-06-27 ASSESSMENT — PATIENT HEALTH QUESTIONNAIRE - PHQ9: SUM OF ALL RESPONSES TO PHQ QUESTIONS 1-9: 0

## 2019-06-27 NOTE — TELEPHONE ENCOUNTER
"Requested Prescriptions   Pending Prescriptions Disp Refills     levothyroxine (SYNTHROID/LEVOTHROID) 125 MCG tablet 90 tablet 3     Sig: Take 1 tablet (125 mcg) by mouth daily   Last Written Prescription Date:  6/27/2019  Last Fill Quantity: 90,  # refills: 3   Last office visit: 6/26/2019 with prescribing provider:  Victorino   Future Office Visit:        Thyroid Protocol Passed - 6/27/2019  8:24 AM        Passed - Patient is 12 years or older        Passed - Recent (12 mo) or future (30 days) visit within the authorizing provider's specialty     Patient had office visit in the last 12 months or has a visit in the next 30 days with authorizing provider or within the authorizing provider's specialty.  See \"Patient Info\" tab in inbasket, or \"Choose Columns\" in Meds & Orders section of the refill encounter.              Passed - Medication is active on med list        Passed - Normal TSH on file in past 12 months     Recent Labs   Lab Test 06/26/19  0900   TSH 2.14            Denied  Duplicate  Kourtney Oseguera RN      "

## 2019-07-01 ENCOUNTER — PATIENT OUTREACH (OUTPATIENT)
Dept: NURSING | Facility: CLINIC | Age: 44
End: 2019-07-01

## 2019-07-01 NOTE — PROGRESS NOTES
Received Health Coaching Referral-Outreached patient for the first time, but wasn't able to connect. Left message for call back.    Tomas Boone, Health    7/1/2019    3:37 PM

## 2019-07-23 ENCOUNTER — PATIENT OUTREACH (OUTPATIENT)
Dept: NURSING | Facility: CLINIC | Age: 44
End: 2019-07-23

## 2019-07-23 NOTE — PROGRESS NOTES
Outreached patient for the second time, and was able to connect.  Patient agrees to participate in Health Coaching and scheduled first appointment for 8/2/2019.    Tomas Boone, Health    7/23/2019    2:50 PM

## 2019-08-02 ENCOUNTER — PATIENT OUTREACH (OUTPATIENT)
Dept: NURSING | Facility: CLINIC | Age: 44
End: 2019-08-02

## 2019-08-02 PROCEDURE — 99207 ZZC HEALTH COACHING, NO CHARGE: CPT

## 2019-08-02 NOTE — PROGRESS NOTES
August 2, 2019    Health Coaching Progress Note    Patient Name: Kodak Crowe Date: August 2, 2019      Session Length: 60      DATA    PRM Master Survey Scores Reviewed: Yes    Core Healthy Days Survey:    Would you say that in general your health is: : Very Good    TRACEY Score (Last Two) 8/15/2011 8/2/2019   TRACEY Raw Score 46 38   Activation Score 75.3 83.7   TRACEY Level 4 4       PHQ-2 Score 8/2/2019 6/26/2019   PHQ-2 Total Score (Adult) - Positive if 3 or more points; Administer PHQ-9 if positive 0 0   MyC PHQ-2 Score - 3       PHQ-9 SCORE 6/26/2019   PHQ-9 Total Score MyChart 0   PHQ-9 Total Score 0       Treatment Objective(s) Addressed in This Session:  Target Behavior(s): diet/weight loss and disease management/lifestyle changes of looking into using a smartphone food tracking/weight loss adrian to enter in meals and snacks, being more aware of food choices/portion sizes, spacing out meals to smaller meals with snacks, looking at opportunites to switch up his workouts-adding in different modes of exercise (biking/treadmill), different pace or incline, trying out interval training and some hiit workouts to add in more variety, accessing resources and attending appointments as scheduled.    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 as he truly enjoys it-usually struggles with eating habits  Areas in Need of Improvement:   1) Healthier eating-portion control, less carb, tracking food  2)  Switching up exercise-body adapted to routine  3) Adding other modes of exercise  Barriers to Change:   1) Recent back surgery  2) Already works out regularly  3) Two young daughters involved in a lot of activities  Reasons for Change:   1) Lose weight  2) Be healthier  3) Feel better  Plan/Goal for the Next 4 Weeks:   Goals Addressed as of 8/2/2019 at 2:46 PM                 Today      Being Active (pt-stated)   0%    Added 8/2/19 by Tomas Boone     My Goal: I will try changing up my daily cardiovascular workouts by adding in some interval training, using different machines, a combination of machines, or different speeds/inclines    What I need to meet my goal: Try some new workouts    I plan to meet my goal by this date: Next Coaching Session          Being Active (pt-stated)   0%    Added 8/2/19 by Tomas Boone     My Goal: I will try adding in a variety of new HIIT workouts to add a new element to my workouts each week    What I need to meet my goal: New routine/trying different things    I plan to meet my goal by this date: Next Coaching Session          Monitoring (pt-stated)   0%    Added 8/2/19 by Tomas Boone     My Goal: I will check out some of the food tracking apps for my smartphone like Lose-It! or [employer] and start recording my daily food intake    What I need to meet my goal: Smartphone/Gasper    I plan to meet my goal by this date: Next Coaching Session            Intervention:  Motivational Interviewing    MI Intervention: Expressed Empathy/Understanding, Supported Autonomy, Collaboration, Evocation, Permission to raise concern or advise, Open-ended questions, Reflections: simple and complex and Change talk (evoked)     Change Talk Expressed by the Patient: Desire to change Ability to change Reasons to change Need to change Committment to change Activation Taking steps    Provider Response to Change Talk: E - Evoked more info from patient about behavior change, A - Affirmed patient's  thoughts, decisions, or attempts at behavior change, R - Reflected patient's change talk and S - Summarized patient's change talk statements    Assessment / Progress on Treatment Objective(s) / Homework:    New Objective established this session - PREPARATION (Decided to change - considering how); Intervened by negotiating a change plan and determining options / strategies for behavior change, identifying triggers, exploring social supports, and working towards setting a date to begin behavior change     Plan: (Homework, other):  Patient was encouraged to continue to seek condition-related information and education, as well as schedule a follow up appointment with the Health  in 4 weeks. Patient has set self-identified goals and will monitor progress until the next appointment.  Scheduled our next coaching session for Friday September 6th at 9:30am.      [facility]    8/2/2019    2:55 PM

## 2019-09-06 ENCOUNTER — PATIENT OUTREACH (OUTPATIENT)
Dept: NURSING | Facility: CLINIC | Age: 44
End: 2019-09-06

## 2019-09-06 DIAGNOSIS — R63.5 WEIGHT GAIN: Primary | ICD-10-CM

## 2019-09-06 PROCEDURE — 99207 ZZC HEALTH COACHING, NO CHARGE: CPT

## 2019-09-06 NOTE — PROGRESS NOTES
September 6, 2019    Health Coaching Progress Note    Patient Name: Kodak Crowe Date: September 6, 2019      Session Length: 30      DATA    PRM Master Survey Scores Reviewed: Yes    Core Healthy Days Survey:         TRACEY Score (Last Two) 8/15/2011 8/2/2019   TRACEY Raw Score 46 38   Activation Score 75.3 83.7   TRACEY Level 4 4       PHQ-2 Score 8/2/2019 6/26/2019   PHQ-2 Total Score (Adult) - Positive if 3 or more points; Administer PHQ-9 if positive 0 0   MyC PHQ-2 Score - 3       PHQ-9 SCORE 6/26/2019   PHQ-9 Total Score MyChart 0   PHQ-9 Total Score 0       Treatment Objective(s) Addressed in This Session:  Target Behavior(s): diet/weight loss and disease management/lifestyle changes of looking into using a smartphone food tracking/weight loss gasper to enter in meals and snacks, being more aware of food choices/portion sizes, spacing out meals to smaller meals with snacks, looking at opportunites to switch up his workouts-adding in different modes of exercise (biking/treadmill), different pace or incline, trying out interval training and some hiit workouts to add in more variety, accessing resources and attending appointments as scheduled.     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  Patient reports he has started tracking his food intake on the Lose-It! Gasper and is now down  6-7lbs.  Areas in Need of Improvement:   1) Healthier eating-portion control, less carb, tracking food  2) Switching up exercise-body adapted to routine  3) Adding other modes of exercise  Barriers to Change:   1) Recent back surgery  2) Already works out regularly  3) Two young daughters involved in a lot of activities  Reasons for Change:   1) Lose weight  2) Be healthier  3) Feel better  Plan/Goal for the Next 4 Weeks:   Goals Addressed as of 9/6/2019 at 10:46 AM                 Today    8/2/19      Being Active (pt-stated)   90%  0%    Added 8/2/19 by Tomas Boone     My Goal: I will try changing up my daily cardiovascular workouts by adding in some interval training, using different machines, a combination of machines, or different speeds/inclines    What I need to meet my goal: Try some new workouts    I plan to meet my goal by this date: Next Coaching Session          Being Active (pt-stated)   70%  0%    Added 8/2/19 by Tomas Boone     My Goal: I will try adding in a variety of new HIIT workouts to add a new element to my workouts each week    What I need to meet my goal: New routine/trying different things    I plan to meet my goal by this date: Next Coaching Session          Monitoring (pt-stated)   100%  0%    Added 8/2/19 by Tomas Boone     My Goal: I will check out some of the food tracking apps for my smartphone like Lose-It! or [employer] and start recording my daily food intake    What I need to meet my goal: Smartphone/Gasper    I plan to meet my goal by this date: Next Coaching Session            Intervention:  Motivational Interviewing    MI Intervention: Expressed Empathy/Understanding, Supported Autonomy, Collaboration, Evocation, Permission to raise concern or advise, Open-ended questions, Reflections: simple and complex and Change talk (evoked)     Change Talk Expressed by the Patient: Desire to change Ability to change Reasons to change Need to change Committment to change Activation  Taking steps    Provider Response to Change Talk: E - Evoked more info from patient about behavior change, A - Affirmed patient's thoughts, decisions, or attempts at behavior change, R - Reflected patient's change talk and S - Summarized patient's change talk statements    Assessment / Progress on Treatment Objective(s) / Homework:    Achieved / completed to satisfaction - MAINTENANCE (Working to maintain change, with risk of relapse); Intervened by continuing to positively reinforce healthy behavior choice   Satisfactory progress - ACTION (Actively working towards change); Intervened by reinforcing change plan / affirming steps taken     Plan: (Homework, other):  Patient was encouraged to continue to seek condition-related information and education, as well as schedule a follow up appointment with the Health  as needed. Patient has set self-identified goals and will monitor progress until the next appointment.  Patient plans to continue telephonic coaching with Health Coaching colleague to complete his remaining sessions. He hopes to continue to see more progress with his weight loss and maintain his overall good health.      [facility]    9/6/2019    10:54 AM

## 2019-09-26 ENCOUNTER — PATIENT OUTREACH (OUTPATIENT)
Dept: NURSING | Facility: CLINIC | Age: 44
End: 2019-09-26

## 2019-10-25 ENCOUNTER — PATIENT OUTREACH (OUTPATIENT)
Dept: NURSING | Facility: CLINIC | Age: 44
End: 2019-10-25

## 2019-10-25 DIAGNOSIS — R63.5 WEIGHT GAIN: Primary | ICD-10-CM

## 2019-10-25 PROCEDURE — 99207 ZZC HEALTH COACHING, NO CHARGE: CPT

## 2019-10-25 NOTE — PROGRESS NOTES
October 25, 2019    Health Coaching Progress Note    Patient Name: Kodak Crowe Date: October 25, 2019      Session Length: 60      DATA    PRM Master Survey Scores Reviewed: Yes    Core Healthy Days Survey:         TRACEY Score (Last Two) 8/15/2011 8/2/2019   TRACEY Raw Score 46 38   Activation Score 75.3 83.7   TRACEY Level 4 4       PHQ-2 Score 8/2/2019 6/26/2019   PHQ-2 Total Score (Adult) - Positive if 3 or more points; Administer PHQ-9 if positive 0 0   MyC PHQ-2 Score - 3       PHQ-9 SCORE 6/26/2019   PHQ-9 Total Score MyChart 0   PHQ-9 Total Score 0       Treatment Objective(s) Addressed in This Session:  Target Behavior(s): diet/weight loss    Current Stressors / Issues:  Kodak notes no stressors or issues today.    What Patient Does Well:   Kodak eats relatively healthy and is very active.  Previous Successes:   Kodak has been tracking his intake with Lose it! Gasper since August and is finding this helpful.  Areas in Need of Improvement:   Kodak notes no new areas in need of improvement and plans to continue tracking intake targeting less carbs and working on portion control in addition to regular workouts (switching up regularly). Writer affirms this is a good plan and offers encouragement. Writer asks Kodak if he knows what his body fat is now and he says he does not. We discuss this related to his goal work and this is something he will consider in the future (may come to [facility] for Body fat % as an option also).  Barriers to Change:   1) Prior back surgery  2) 2 daughters involved in many activities  Reasons for Change:   Kodak wants to make changes for his health and self-esteem.  Plan/Goal for the Next 4 Weeks:   Goals Addressed as of 10/25/2019 at 2:05 PM                 Today    9/6/19      Being Active (pt-stated)   On track  90%    Added 8/2/19 by Tomas Boone     My Goal: I will try changing up my daily cardiovascular workouts by adding in some interval training, using different machines, a combination  of machines, or different speeds/inclines    What I need to meet my goal: Try some new workouts    I plan to meet my goal by this date: Next Coaching Session          Being Active (pt-stated)   70%  70%    Added 8/2/19 by Tomas Booen     My Goal: I will try adding in a variety of new HIIT workouts to add a new element to my workouts each week    What I need to meet my goal: New routine/trying different things    I plan to meet my goal by this date: Next Coaching Session          Monitoring (pt-stated)   On track  100%    Added 8/2/19 by Tomas Boone     My Goal: I will continue using Lose-It! for recording my daily food intake    What I need to meet my goal: Smartphone/Gasper    I plan to meet my goal by this date: Next Coaching Session            Intervention:  Motivational Interviewing    MI Intervention: Expressed Empathy/Understanding, Supported Autonomy, Collaboration, Evocation, Permission to raise concern or advise, Open-ended questions, Reflections: simple and complex, Change talk (evoked) and Reframe     Change Talk Expressed by the Patient: Desire to change Ability to change Reasons to change Committment to change Activation Taking steps    Provider Response to Change Talk: E - Evoked more info from patient about behavior change, A - Affirmed patient's thoughts, decisions, or attempts at behavior change, R - Reflected patient's change talk and S - Summarized patient's change talk statements    Assessment / Progress on Treatment Objective(s) / Homework:    Satisfactory progress - ACTION (Actively working towards change); Intervened by reinforcing change plan / affirming steps taken     Plan: (Homework, other):  Patient was encouraged to continue to seek condition-related information and education, as well as schedule a follow up appointment with the Health  as needed. Patient has set self-identified goals and will monitor progress until the next appointment.  Next visit scheduled for December 13 at  9:30AM.      Jakob Alva, [facility]   10/25/2019    2:06 PM

## 2019-12-13 ENCOUNTER — PATIENT OUTREACH (OUTPATIENT)
Dept: NURSING | Facility: CLINIC | Age: 44
End: 2019-12-13

## 2019-12-13 DIAGNOSIS — R63.5 WEIGHT GAIN: Primary | ICD-10-CM

## 2019-12-13 PROCEDURE — 99207 ZZC HEALTH COACHING, NO CHARGE: CPT

## 2019-12-13 NOTE — PROGRESS NOTES
December 13, 2019    Health Coaching Progress Note    Patient Name: Kodak Crowe Date: December 13, 2019      Session Length: 50      DATA    PRM Master Survey Scores Reviewed: Yes    Core Healthy Days Survey:         TRACEY Score (Last Two) 8/15/2011 8/2/2019   TRACEY Raw Score 46 38   Activation Score 75.3 83.7   TRACEY Level 4 4       PHQ-2 Score 8/2/2019 6/26/2019   PHQ-2 Total Score (Adult) - Positive if 3 or more points; Administer PHQ-9 if positive 0 0   MyC PHQ-2 Score - 3       PHQ-9 SCORE 6/26/2019   PHQ-9 Total Score MyChart 0   PHQ-9 Total Score 0       Treatment Objective(s) Addressed in This Session:  Target Behavior(s): diet/weight loss    Current Stressors / Issues:  Kodak reports not much has changed since our last visit.    What Patient Does Well:   Kodak continues with his good habits around diet and exercise and is working on a plan to begin incorporating more strength training safely (back health concerns).  Previous Successes:   Kodak shares that he ran outside a couple of times since our last visit and did this with his daughter which they both enjoyed.  Areas in Need of Improvement:   Kodak says the area in need of improvement at this time is trying a strategy (or more than 1) to try to increase his strength training work as currently he primarily engages in hard cardio for 40+minutes on most days of the week. Kodak comes up with some strategies for this during our visit.    Barriers to Change:   1) Fear of reinjury  Reasons for Change:   Kodak wants to lose weight for his health and self-esteem.  Plan/Goal for the Next 4 Weeks:   Goals Addressed as of 12/13/2019 at 1:45 PM                 Today    10/25/19      Being Active (pt-stated)   On track  On track    Added 8/2/19 by Tomas Boone     My Goal: I will try changing up my daily cardiovascular workouts by adding in some interval training, using different machines, a combination of machines, or different speeds/inclines    What I need to meet my goal:  Try some new workouts    I plan to meet my goal by this date: Next Coaching Session          Being Active (pt-stated)   70%  70%    Added 8/2/19 by Tomas Boone     My Goal: I will try adding in a variety of new HIIT workouts to add a new element to my workouts each week    What I need to meet my goal: New routine/trying different things    I plan to meet my goal by this date: Next Coaching Session          Monitoring (pt-stated)   On track  On track    Added 8/2/19 by Tomas Boone     My Goal: I will continue using Lose-It! for recording my daily food intake    What I need to meet my goal: Smartphone/Gasper    I plan to meet my goal by this date: Next Coaching Session            Intervention:  Motivational Interviewing    MI Intervention: Expressed Empathy/Understanding, Supported Autonomy, Collaboration, Evocation, Permission to raise concern or advise, Open-ended questions, Reflections: simple and complex, Change talk (evoked) and Reframe     Change Talk Expressed by the Patient: Desire to change Ability to change Reasons to change Need to change Committment to change Activation Taking steps    Provider Response to Change Talk: E - Evoked more info from patient about behavior change, A - Affirmed patient's thoughts, decisions, or attempts at behavior change, R - Reflected patient's change talk and S - Summarized patient's change talk statements    Assessment / Progress on Treatment Objective(s) / Homework:    Minimal progress - ACTION (Actively working towards change); Intervened by reinforcing change plan / affirming steps taken     Plan: (Homework, other):  Patient was encouraged to continue to seek condition-related information and education, as well as schedule a follow up appointment with the Health  in 6 weeks. Patient has set self-identified goals and will monitor progress until the next appointment.  Next visit scheduled for January 24 at 9:30AM.      [facility]   12/13/2019    1:48  PM

## 2020-01-03 ENCOUNTER — TRANSFERRED RECORDS (OUTPATIENT)
Dept: HEALTH INFORMATION MANAGEMENT | Facility: CLINIC | Age: 45
End: 2020-01-03

## 2020-01-24 ENCOUNTER — PATIENT OUTREACH (OUTPATIENT)
Dept: NURSING | Facility: CLINIC | Age: 45
End: 2020-01-24

## 2020-01-24 DIAGNOSIS — R63.5 WEIGHT GAIN: Primary | ICD-10-CM

## 2020-01-24 PROCEDURE — 99207 ZZC HEALTH COACHING, NO CHARGE: CPT

## 2020-01-24 NOTE — PROGRESS NOTES
January 24, 2020    Health Coaching Progress Note    Patient Name: Kodak Crowe Date: January 24, 2020      Session Length: 30      DATA    PRM Master Survey Scores Reviewed: Yes    Core Healthy Days Survey:    Would you say that in general your health is: : Good    TRACEY Score (Last Two) 8/2/2019 1/24/2020   TRACEY Raw Score 38 40   Activation Score 83.7 100   TRACEY Level 4 4       PHQ-2 Score 1/24/2020 8/2/2019   PHQ-2 Total Score (Adult) - Positive if 3 or more points; Administer PHQ-9 if positive 0 0   MyC PHQ-2 Score - -       PHQ-9 SCORE 6/26/2019   PHQ-9 Total Score MyChart 0   PHQ-9 Total Score 0       Treatment Objective(s) Addressed in This Session:  Target Behavior(s): diet/weight loss and disease management/lifestyle changes increase strength training    Current Stressors / Issues:  Kodak reports no new stressors or issues today and says life is just really busy as always.    What Patient Does Well:   Kodak has begun incorporating some strength training into his routine regularly since our last visit.  Previous Successes:   Kodak started doing some bench press, curls, lunges, dips, and using some Kettlebells for strength training and enjoyed this.  Areas in Need of Improvement:   Kodak notes no new areas in need of improvements at this time and plans to continue working on current goals. Writer affirms this is a good plan.  Barriers to Change:   1) Prior back surgery  Reasons for Change:   Kodak wants to lose weight for his health and self-esteem.  Plan/Goal for the Next 4 Weeks:   Goals Addressed as of 1/24/2020 at 3:02 PM                 Today    12/13/19      Being Active (pt-stated)   On track  On track    Added 8/2/19 by Tomas Boone     My Goal: I will try changing up my daily cardiovascular workouts by adding in some interval training, using different machines, a combination of machines, or different speeds/inclines    What I need to meet my goal: Try some new workouts    I plan to meet my goal by this date:  Next Coaching Session          Being Active (pt-stated)   On track  70%    Added 8/2/19 by Tomas Boone     My Goal: I will try adding in a variety of new HIIT workouts to add a new element to my workouts each week    What I need to meet my goal: New routine/trying different things    I plan to meet my goal by this date: Next Coaching Session          Monitoring (pt-stated)   On track  On track    Added 8/2/19 by Tomas Boone     My Goal: I will continue using Lose-It! for recording my daily food intake    What I need to meet my goal: Smartphone/Gasper    I plan to meet my goal by this date: Next Coaching Session            Intervention:  Motivational Interviewing    MI Intervention: Expressed Empathy/Understanding, Supported Autonomy, Collaboration, Evocation, Permission to raise concern or advise, Open-ended questions, Reflections: simple and complex, Change talk (evoked) and Reframe     Change Talk Expressed by the Patient: Desire to change Ability to change Reasons to change Need to change Committment to change Activation Taking steps    Provider Response to Change Talk: E - Evoked more info from patient about behavior change, A - Affirmed patient's thoughts, decisions, or attempts at behavior change, R - Reflected patient's change talk and S - Summarized patient's change talk statements    Assessment / Progress on Treatment Objective(s) / Homework:    Satisfactory progress - ACTION (Actively working towards change); Intervened by reinforcing change plan / affirming steps taken     Plan: (Homework, other):  Patient was encouraged to continue to seek condition-related information and education, as well as schedule a follow up appointment with the Health  No further visits scheduled. Patient has set self-identified goals and will monitor progress until the next appointment.  Patient has completed the health coaching program.      Jakob Alva, Health   1/24/2020    3:02 PM

## 2020-09-27 DIAGNOSIS — E03.1 CONGENITAL HYPOTHYROIDISM WITHOUT GOITER: ICD-10-CM

## 2020-09-28 RX ORDER — LEVOTHYROXINE SODIUM 125 UG/1
TABLET ORAL
Qty: 30 TABLET | Refills: 0 | Status: SHIPPED | OUTPATIENT
Start: 2020-09-28 | End: 2020-10-14

## 2020-09-28 NOTE — TELEPHONE ENCOUNTER
Had 3 month laurita refill due to upcoming appt.  Cancelled 8/3/20 appt.  Now scheduled 10/14/20.  Sent to provider to advise on 30 or 90 day refill.  Kamille Corrales RN

## 2020-10-14 ENCOUNTER — OFFICE VISIT (OUTPATIENT)
Dept: FAMILY MEDICINE | Facility: CLINIC | Age: 45
End: 2020-10-14
Payer: COMMERCIAL

## 2020-10-14 VITALS
OXYGEN SATURATION: 99 % | HEART RATE: 64 BPM | BODY MASS INDEX: 34.85 KG/M2 | DIASTOLIC BLOOD PRESSURE: 86 MMHG | HEIGHT: 72 IN | WEIGHT: 257.3 LBS | TEMPERATURE: 98.5 F | SYSTOLIC BLOOD PRESSURE: 124 MMHG

## 2020-10-14 DIAGNOSIS — Z23 NEED FOR TDAP VACCINATION: ICD-10-CM

## 2020-10-14 DIAGNOSIS — E66.811 CLASS 1 OBESITY WITHOUT SERIOUS COMORBIDITY WITH BODY MASS INDEX (BMI) OF 34.0 TO 34.9 IN ADULT, UNSPECIFIED OBESITY TYPE: ICD-10-CM

## 2020-10-14 DIAGNOSIS — E03.1 CONGENITAL HYPOTHYROIDISM WITHOUT GOITER: ICD-10-CM

## 2020-10-14 DIAGNOSIS — Z00.00 ROUTINE GENERAL MEDICAL EXAMINATION AT A HEALTH CARE FACILITY: Primary | ICD-10-CM

## 2020-10-14 PROCEDURE — 82947 ASSAY GLUCOSE BLOOD QUANT: CPT | Performed by: PHYSICIAN ASSISTANT

## 2020-10-14 PROCEDURE — 36415 COLL VENOUS BLD VENIPUNCTURE: CPT | Performed by: PHYSICIAN ASSISTANT

## 2020-10-14 PROCEDURE — 99000 SPECIMEN HANDLING OFFICE-LAB: CPT | Performed by: PHYSICIAN ASSISTANT

## 2020-10-14 PROCEDURE — 84403 ASSAY OF TOTAL TESTOSTERONE: CPT | Mod: 90 | Performed by: PHYSICIAN ASSISTANT

## 2020-10-14 PROCEDURE — 84270 ASSAY OF SEX HORMONE GLOBUL: CPT | Performed by: PHYSICIAN ASSISTANT

## 2020-10-14 PROCEDURE — 99213 OFFICE O/P EST LOW 20 MIN: CPT | Mod: 25 | Performed by: PHYSICIAN ASSISTANT

## 2020-10-14 PROCEDURE — 80061 LIPID PANEL: CPT | Performed by: PHYSICIAN ASSISTANT

## 2020-10-14 PROCEDURE — 90471 IMMUNIZATION ADMIN: CPT

## 2020-10-14 PROCEDURE — 84443 ASSAY THYROID STIM HORMONE: CPT | Performed by: PHYSICIAN ASSISTANT

## 2020-10-14 PROCEDURE — 99396 PREV VISIT EST AGE 40-64: CPT | Mod: 25 | Performed by: PHYSICIAN ASSISTANT

## 2020-10-14 PROCEDURE — 90715 TDAP VACCINE 7 YRS/> IM: CPT

## 2020-10-14 RX ORDER — LEVOTHYROXINE SODIUM 125 UG/1
125 TABLET ORAL DAILY
Qty: 90 TABLET | Refills: 3 | Status: SHIPPED | OUTPATIENT
Start: 2020-10-14 | End: 2021-09-27

## 2020-10-14 ASSESSMENT — ENCOUNTER SYMPTOMS
HEMATOCHEZIA: 0
CHILLS: 0
HEMATURIA: 0
CONSTIPATION: 0
DIARRHEA: 0
DIZZINESS: 0
COUGH: 0
EYE PAIN: 0
ABDOMINAL PAIN: 0
NERVOUS/ANXIOUS: 0

## 2020-10-14 ASSESSMENT — MIFFLIN-ST. JEOR: SCORE: 2087.17

## 2020-10-14 NOTE — PROGRESS NOTES
SUBJECTIVE:   CC: Kodak Crowe is an 45 year old male who presents for preventative health visit.       Patient has been advised of split billing requirements and indicates understanding: Yes  Healthy Habits:     Getting at least 3 servings of Calcium per day:  Yes    Bi-annual eye exam:  Yes    Dental care twice a year:  Yes    Sleep apnea or symptoms of sleep apnea:  None    Diet:  Carbohydrate counting    Frequency of exercise:  6-7 days/week    Duration of exercise:  Greater than 60 minutes    Taking medications regularly:  Yes    Medication side effects:  None    PHQ-2 Total Score: 0    Additional concerns today:  No          Hypothyroidism Follow-up      Since last visit, patient describes the following symptoms: Weight stable, no hair loss, no skin changes, no constipation, no loose stools    However continues to struggle with weight loss. Works out daily and limits calories and no improvement in weight. Has went through dietician as well. Also admits to increased fatigue and is concerned about possible testosterone deficiency.       Today's PHQ-2 Score:   PHQ-2 ( 1999 [employer]) 10/14/2020   Q1: Little interest or pleasure in doing things 0   Q2: Feeling down, depressed or hopeless 0   PHQ-2 Score 0   Q1: Little interest or pleasure in doing things Not at all   Q2: Feeling down, depressed or hopeless Not at all   PHQ-2 Score 0       Abuse: Current or Past(Physical, Sexual or Emotional)- No  Do you feel safe in your environment? Yes        Social History     Tobacco Use     Smoking status: Never Smoker     Smokeless tobacco: Never Used   Substance Use Topics     Alcohol use: Yes     Alcohol/week: 0.0 standard drinks     Comment: occasional     If you drink alcohol do you typically have >3 drinks per day or >7 drinks per week? No    Alcohol Use 10/14/2020   Prescreen: >3 drinks/day or >7 drinks/week? No   Prescreen: >3 drinks/day or >7 drinks/week? -   No flowsheet data found.    Last PSA:   PSA   Date Value Ref  Range Status   04/09/2010 0.30 0 - 4 ug/L Final       Reviewed orders with patient. Reviewed health maintenance and updated orders accordingly - Yes  BP Readings from Last 3 Encounters:   10/14/20 124/86   06/26/19 125/84   06/15/18 132/77    Wt Readings from Last 3 Encounters:   10/14/20 116.7 kg (257 lb 4.8 oz)   06/26/19 114.9 kg (253 lb 4.8 oz)   06/15/18 113.9 kg (251 lb)                  Patient Active Problem List   Diagnosis     Family history of hemochromatosis     Hypothyroid     Lumbar radiculopathy     Past Surgical History:   Procedure Laterality Date     DISCECTOMY LUMBAR POSTERIOR MICROSCOPIC ONE LEVEL Right 4/2/2018    Procedure: DISCECTOMY LUMBAR POSTERIOR MICROSCOPIC ONE LEVEL;  Right L4-5 hemilaminectomy, medial facetectomy, foraminotomy with microdiscectomy ;  Surgeon: Suhail Segura MD;  Location: [facility] OR     ENT SURGERY       VASECTOMY         Social History     Tobacco Use     Smoking status: Never Smoker     Smokeless tobacco: Never Used   Substance Use Topics     Alcohol use: Yes     Alcohol/week: 0.0 standard drinks     Comment: occasional     Family History   Problem Relation Age of Onset     Obesity Mother      Hypertension Mother      Eye Disorder Mother         Spink's disease     Blood Disease Father         hematomacrosis     Diabetes Paternal Grandfather      Lipids Brother      Blood Disease Brother         hematomacrosis         Current Outpatient Medications   Medication Sig Dispense Refill     levothyroxine (SYNTHROID/LEVOTHROID) 125 MCG tablet Take 1 tablet (125 mcg) by mouth daily 90 tablet 3     Allergies   Allergen Reactions     No Known Drug Allergies      Recent Labs   Lab Test 06/26/19  0900 06/15/18  1433 03/11/16  0935 03/11/16  0935 02/07/14  1505 02/07/14  1505 08/12/13  0823   A1C  --   --   --   --   --  5.5 5.4   LDL  --   --   --  107*  --  138* 115   HDL  --   --   --  54  --  48 37*   TRIG  --   --   --  62  --  91 90   ALT  --   --   --  55  --  44  "51   CR  --   --   --  0.98  --  1.23 1.03   GFRESTIMATED  --   --   --  84  --  66 81   GFRESTBLACK  --   --   --  >90  African American GFR Calc    --  80 >90   POTASSIUM  --   --   --  4.6  --  4.1 4.6   TSH 2.14 1.50   < > 2.05   < > 3.05 5.22*    < > = values in this interval not displayed.        Reviewed and updated as needed this visit by clinical staff  Tobacco  Allergies  Meds  Problems  Med Hx  Surg Hx  Fam Hx  Soc Hx          Reviewed and updated as needed this visit by Provider  Tobacco  Allergies  Meds  Problems  Med Hx  Surg Hx  Fam Hx         Past Medical History:   Diagnosis Date     Tourette's disorder 1986      Past Surgical History:   Procedure Laterality Date     DISCECTOMY LUMBAR POSTERIOR MICROSCOPIC ONE LEVEL Right 4/2/2018    Procedure: DISCECTOMY LUMBAR POSTERIOR MICROSCOPIC ONE LEVEL;  Right L4-5 hemilaminectomy, medial facetectomy, foraminotomy with microdiscectomy ;  Surgeon: Suhail Segura MD;  Location: [facility] OR     ENT SURGERY       VASECTOMY         Review of Systems   Constitutional: Negative for chills.   HENT: Negative for congestion and ear pain.    Eyes: Negative for pain.   Respiratory: Negative for cough.    Cardiovascular: Negative for chest pain.   Gastrointestinal: Negative for abdominal pain, constipation, diarrhea and hematochezia.   Genitourinary: Negative for hematuria.   Neurological: Negative for dizziness.   Psychiatric/Behavioral: The patient is not nervous/anxious.      OBJECTIVE:   /86 (BP Location: Right arm, Patient Position: Chair, Cuff Size: Adult Large)   Pulse 64   Temp 98.5  F (36.9  C) (Oral)   Ht 1.816 m (5' 11.5\")   Wt 116.7 kg (257 lb 4.8 oz)   SpO2 99%   BMI 35.39 kg/m      Physical Exam  GENERAL: alert, no distress and obese  EYES: Eyes grossly normal to inspection, PERRL and conjunctivae and sclerae normal  HENT: ear canals and TM's normal, nose and mouth without ulcers or lesions  NECK: no adenopathy, no asymmetry, " masses, or scars and thyroid normal to palpation  RESP: lungs clear to auscultation - no rales, rhonchi or wheezes  CV: regular rate and rhythm, normal S1 S2, no S3 or S4, no murmur, click or rub, no peripheral edema and peripheral pulses strong  ABDOMEN: soft, nontender, no hepatosplenomegaly, no masses and bowel sounds normal  MS: no gross musculoskeletal defects noted, no edema  SKIN: no suspicious lesions or rashes  NEURO: Normal strength and tone, mentation intact and speech normal  PSYCH: mentation appears normal, affect normal/bright  LYMPH: no cervical adenopathy    Diagnostic Test Results:  none     ASSESSMENT/PLAN:   (Z00.00) Routine general medical examination at a health care facility  (primary encounter diagnosis)  Comment: stable exam. Discussed in detail weight. See below.   Plan: GLUCOSE, Lipid panel reflex to direct LDL         Fasting            (E66.9,  Z68.34) Class 1 obesity without serious comorbidity with body mass index (BMI) of 34.0 to 34.9 in adult, unspecified obesity type  Comment: ongoing. Failed conservative treatment. Discussed weight loss clinic. Patient is not interested in this at this time. Given ongoing symptoms and fatigue, I feel testosterone evaluation is warranted. Screening today. Recommend however to continue on diet and exercise mods   Plan: Testosterone Free and Total            (E03.1) Congenital hypothyroidism without goiter  Comment: stable per pateint. tsh pending. Stable for years. Refilled medications.   Plan: TSH with free T4 reflex, levothyroxine         (SYNTHROID/LEVOTHROID) 125 MCG tablet        -Medication use and side effects discussed with the patient. Patient is in complete understanding and agreement with plan.       (Z23) Need for Tdap vaccination  Comment:   Plan: TDAP VACCINE              Patient has been advised of split billing requirements and indicates understanding: Yes  COUNSELING:   Reviewed preventive health counseling, as reflected in patient  "instructions       Regular exercise       Healthy diet/nutrition    Estimated body mass index is 35.39 kg/m  as calculated from the following:    Height as of this encounter: 1.816 m (5' 11.5\").    Weight as of this encounter: 116.7 kg (257 lb 4.8 oz).     Weight management plan: Discussed healthy diet and exercise guidelines    He reports that he has never smoked. He has never used smokeless tobacco.      Counseling Resources:  ATP IV Guidelines  Pooled Cohorts Equation Calculator  FRAX Risk Assessment  ICSI Preventive Guidelines  Dietary Guidelines for Americans, 2010  USDA's MyPlate  ASA Prophylaxis  Lung CA Screening    SUZANNE Ramirez [facility]    "

## 2020-10-15 LAB
CHOLEST SERPL-MCNC: 204 MG/DL
GLUCOSE SERPL-MCNC: 100 MG/DL (ref 70–99)
HDLC SERPL-MCNC: 42 MG/DL
LDLC SERPL CALC-MCNC: 130 MG/DL
NONHDLC SERPL-MCNC: 162 MG/DL
TRIGL SERPL-MCNC: 159 MG/DL
TSH SERPL DL<=0.005 MIU/L-ACNC: 1.32 MU/L (ref 0.4–4)

## 2020-10-16 LAB
SHBG SERPL-SCNC: 19 NMOL/L (ref 11–80)
TESTOST FREE SERPL-MCNC: 9.01 NG/DL (ref 4.7–24.4)
TESTOST SERPL-MCNC: 342 NG/DL (ref 240–950)

## 2020-12-09 ENCOUNTER — TRANSFERRED RECORDS (OUTPATIENT)
Dept: HEALTH INFORMATION MANAGEMENT | Facility: CLINIC | Age: 45
End: 2020-12-09

## 2021-09-17 ENCOUNTER — TRANSFERRED RECORDS (OUTPATIENT)
Dept: HEALTH INFORMATION MANAGEMENT | Facility: CLINIC | Age: 46
End: 2021-09-17

## 2021-09-19 ENCOUNTER — HEALTH MAINTENANCE LETTER (OUTPATIENT)
Age: 46
End: 2021-09-19

## 2021-09-20 ENCOUNTER — TRANSFERRED RECORDS (OUTPATIENT)
Dept: HEALTH INFORMATION MANAGEMENT | Facility: CLINIC | Age: 46
End: 2021-09-20

## 2021-09-24 ENCOUNTER — NURSE TRIAGE (OUTPATIENT)
Dept: FAMILY MEDICINE | Facility: CLINIC | Age: 46
End: 2021-09-24

## 2021-09-24 ENCOUNTER — TRANSFERRED RECORDS (OUTPATIENT)
Dept: HEALTH INFORMATION MANAGEMENT | Facility: CLINIC | Age: 46
End: 2021-09-24

## 2021-09-24 NOTE — TELEPHONE ENCOUNTER
"Patient calls to request an orthopedics referral. He \"tore the quad muscles away from the tendon\" of right knee. Having surgery at [facility] on 09/28/2021. Phone: [phone]. Requesting referral be faxed to: [phone].  Pre-op scheduled on 9/27/21.  Appointment note updated.  Amee Clark RN   "

## 2021-09-27 ENCOUNTER — OFFICE VISIT (OUTPATIENT)
Dept: FAMILY MEDICINE | Facility: CLINIC | Age: 46
End: 2021-09-27
Payer: COMMERCIAL

## 2021-09-27 ENCOUNTER — TELEPHONE (OUTPATIENT)
Dept: FAMILY MEDICINE | Facility: CLINIC | Age: 46
End: 2021-09-27

## 2021-09-27 VITALS
BODY MASS INDEX: 33.86 KG/M2 | DIASTOLIC BLOOD PRESSURE: 88 MMHG | HEART RATE: 60 BPM | WEIGHT: 250 LBS | HEIGHT: 72 IN | TEMPERATURE: 98.5 F | SYSTOLIC BLOOD PRESSURE: 130 MMHG | RESPIRATION RATE: 16 BRPM

## 2021-09-27 DIAGNOSIS — E03.1 CONGENITAL HYPOTHYROIDISM WITHOUT GOITER: ICD-10-CM

## 2021-09-27 DIAGNOSIS — Z01.818 PREOP GENERAL PHYSICAL EXAM: Primary | ICD-10-CM

## 2021-09-27 LAB
HGB BLD-MCNC: 15.5 G/DL (ref 13.3–17.7)
TSH SERPL DL<=0.005 MIU/L-ACNC: 2.09 MU/L (ref 0.4–4)

## 2021-09-27 PROCEDURE — 84443 ASSAY THYROID STIM HORMONE: CPT | Performed by: PHYSICIAN ASSISTANT

## 2021-09-27 PROCEDURE — 99214 OFFICE O/P EST MOD 30 MIN: CPT | Performed by: PHYSICIAN ASSISTANT

## 2021-09-27 PROCEDURE — 36415 COLL VENOUS BLD VENIPUNCTURE: CPT | Performed by: PHYSICIAN ASSISTANT

## 2021-09-27 PROCEDURE — 85018 HEMOGLOBIN: CPT | Performed by: PHYSICIAN ASSISTANT

## 2021-09-27 RX ORDER — LEVOTHYROXINE SODIUM 125 UG/1
125 TABLET ORAL DAILY
Qty: 90 TABLET | Refills: 3 | Status: SHIPPED | OUTPATIENT
Start: 2021-09-27 | End: 2022-10-06

## 2021-09-27 ASSESSMENT — MIFFLIN-ST. JEOR: SCORE: 2056.99

## 2021-09-27 NOTE — TELEPHONE ENCOUNTER
Spoke to Lia from [facility], and informed her that pt has a Managed Care Insurance plan that requires pt to stay within the [facility].  Pt will need to be rerouted back into the [facility].    Maria Isabel-Referrals

## 2021-09-27 NOTE — PROGRESS NOTES
[facility]  [address] [address] [address]  Phone: [phone]  Primary Provider: Buddy Gleason      PREOPERATIVE EVALUATION:  Today's date: 9/27/2021    Kodak Crowe is a 46 year old male who presents for a preoperative evaluation.    Surgical Information:  Surgery/Procedure: right distal quadracep repair  Surgery Location: [facility]  Surgeon: Dr. Justo Hu  Surgery Date: 09/28/2021  Time of Surgery: TBD  Where patient plans to recover: At home with family  Fax number for surgical facility: [phone]    Type of Anesthesia Anticipated: General      Assessment & Plan     The proposed surgical procedure is considered INTERMEDIATE risk.    Preop general physical exam    OK to proceed with surgery.    - Hemoglobin; Future  - TSH with free T4 reflex; Future  - Peds Orthopedics Referral; Future         Risks and Recommendations:  The patient has the following additional risks and recommendations for perioperative complications:   - No identified additional risk factors other than previously addressed    Medication Instructions:  Patient is to take all scheduled medications on the day of surgery    RECOMMENDATION:  APPROVAL GIVEN to proceed with proposed procedure, without further diagnostic evaluation.                      Subjective     HPI related to upcoming procedure: Quadriceps tendon tear- moving a building at his parents house    Preop Questions 9/27/2021   1. Have you ever had a heart attack or stroke? No   2. Have you ever had surgery on your heart or blood vessels, such as a stent placement, a coronary artery bypass, or surgery on an artery in your head, neck, heart, or legs? No   3. Do you have chest pain with activity? No   4. Do you have a history of  heart failure? No   5. Do you currently have a cold, bronchitis or symptoms of other infection? No   6. Do you have a cough, shortness of breath, or wheezing? No   7. Do you or anyone in your  family have previous history of blood clots? No   8. Do you or does anyone in your family have a serious bleeding problem such as prolonged bleeding following surgeries or cuts? No   9. Have you ever had problems with anemia or been told to take iron pills? No   10. Have you had any abnormal blood loss such as black, tarry or bloody stools? No   11. Have you ever had a blood transfusion? No   12. Are you willing to have a blood transfusion if it is medically needed before, during, or after your surgery? Yes   13. Have you or any of your relatives ever had problems with anesthesia? No   14. Do you have sleep apnea, excessive snoring or daytime drowsiness? No   15. Do you have any artifical heart valves or other implanted medical devices like a pacemaker, defibrillator, or continuous glucose monitor? No   16. Do you have artificial joints? No   17. Are you allergic to latex? No       Health Care Directive:  Patient does not have a Health Care Directive or Living Will: Discussed advance care planning with patient; however, patient declined at this time.    Preoperative Review of :   reviewed - no record of controlled substances prescribed.      Status of Chronic Conditions:  HYPOTHYROIDISM - Patient has a longstanding history of chronic Hypothyroidism. Patient has been doing well, noting no tremor, insomnia, hair loss or changes in skin texture. Continues to take medications as directed, without adverse reactions or side effects. Last TSH   Lab Results   Component Value Date    TSH 1.32 10/14/2020   .        Review of Systems  CONSTITUTIONAL: NEGATIVE for fever, chills, change in weight  INTEGUMENTARY/SKIN: NEGATIVE for worrisome rashes, moles or lesions  EYES: NEGATIVE for vision changes or irritation  ENT/MOUTH: NEGATIVE for ear, mouth and throat problems  RESP: NEGATIVE for significant cough or SOB  CV: NEGATIVE for chest pain, palpitations or peripheral edema  GI: NEGATIVE for nausea, abdominal pain,  heartburn, or change in bowel habits  : NEGATIVE for frequency, dysuria, or hematuria  MUSCULOSKELETAL: NEGATIVE for significant arthralgias or myalgia  NEURO: NEGATIVE for weakness, dizziness or paresthesias  ENDOCRINE: NEGATIVE for temperature intolerance, skin/hair changes  HEME: NEGATIVE for bleeding problems  PSYCHIATRIC: NEGATIVE for changes in mood or affect    Patient Active Problem List    Diagnosis Date Noted     Lumbar radiculopathy 10/13/2017     Priority: Medium     Hypothyroid 08/12/2013     Priority: Medium     Family history of hemochromatosis 04/09/2010     Priority: Medium      Past Medical History:   Diagnosis Date     Tourette's disorder 1986     Past Surgical History:   Procedure Laterality Date     DISCECTOMY LUMBAR POSTERIOR MICROSCOPIC ONE LEVEL Right 4/2/2018    Procedure: DISCECTOMY LUMBAR POSTERIOR MICROSCOPIC ONE LEVEL;  Right L4-5 hemilaminectomy, medial facetectomy, foraminotomy with microdiscectomy ;  Surgeon: Suhail Segura MD;  Location: [facility] OR     ENT SURGERY       VASECTOMY       Current Outpatient Medications   Medication Sig Dispense Refill     levothyroxine (SYNTHROID/LEVOTHROID) 125 MCG tablet Take 1 tablet (125 mcg) by mouth daily 90 tablet 3       Allergies   Allergen Reactions     No Known Drug Allergies         Social History     Tobacco Use     Smoking status: Never Smoker     Smokeless tobacco: Never Used   Substance Use Topics     Alcohol use: Yes     Alcohol/week: 0.0 standard drinks     Comment: occasional     Family History   Problem Relation Age of Onset     Obesity Mother      Hypertension Mother      Eye Disorder Mother         Alkol's disease     Blood Disease Father         hematomacrosis     Diabetes Paternal Grandfather      Lipids Brother      Blood Disease Brother         hematomacrosis     History   Drug Use No         Objective     /88 (BP Location: Right arm, Patient Position: Chair, Cuff Size: Adult Large)   Pulse 60   Temp 98.5  F  (36.9  C) (Oral)   Resp 16   Ht 1.829 m (6')   Wt 113.4 kg (250 lb)   BMI 33.91 kg/m        Physical Exam    GENERAL APPEARANCE: healthy, alert and no distress     EYES: EOMI,  PERRL     HENT: ear canals and TM's normal and nose and mouth without ulcers or lesions     NECK: no adenopathy, no asymmetry, masses, or scars and thyroid normal to palpation     RESP: lungs clear to auscultation - no rales, rhonchi or wheezes     CV: regular rates and rhythm, normal S1 S2, no S3 or S4 and no murmur, click or rub     ABDOMEN:  soft, nontender, no HSM or masses and bowel sounds normal     MS: extremities normal- no gross deformities noted, no evidence of inflammation in joints, FROM in all extremities.     SKIN: no suspicious lesions or rashes     NEURO: Normal strength and tone, sensory exam grossly normal, mentation intact and speech normal     PSYCH: mentation appears normal. and affect normal/bright     LYMPHATICS: No cervical adenopathy    No results for input(s): HGB, PLT, INR, NA, POTASSIUM, CR, A1C in the last 55797 hours.     Diagnostics:  Recent Results (from the past 24 hour(s))   Hemoglobin    Collection Time: 09/27/21  8:28 AM   Result Value Ref Range    Hemoglobin 15.5 13.3 - 17.7 g/dL      No EKG required, no history of coronary heart disease, significant arrhythmia, peripheral arterial disease or other structural heart disease.    Revised Cardiac Risk Index (RCRI):  The patient has the following serious cardiovascular risks for perioperative complications:   - No serious cardiac risks = 0 points     RCRI Interpretation: 0 points: Class I (very low risk - 0.4% complication rate)         Signed Electronically by: Evan Puckett PA-C  Copy of this evaluation report is provided to requesting physician.

## 2021-09-27 NOTE — TELEPHONE ENCOUNTER
[facility], Lia, [phone], wants to talk to referrals asap, pt has [employer] Blue [employer] and needs stat referral, see visit today, routed to Maria Isabel/referrals, please advise, inform Lia of plan    Nicolette Chambers, RN, BSN  Message handled by CLINIC NURSE.

## 2021-09-27 NOTE — TELEPHONE ENCOUNTER
AB, let staff know if we need to do anything, see referral note below  Nicolette Chambers RN, BSN  Message handled by CLINIC NURSE.

## 2021-09-27 NOTE — TELEPHONE ENCOUNTER
Referral has been approved by the referral team.  REX Fitzgerald to call Maria Isabel back to inform.    Maria Isabel-Referrals

## 2021-09-27 NOTE — PATIENT INSTRUCTIONS

## 2021-10-08 ENCOUNTER — TELEPHONE (OUTPATIENT)
Dept: FAMILY MEDICINE | Facility: CLINIC | Age: 46
End: 2021-10-08

## 2021-10-08 NOTE — TELEPHONE ENCOUNTER
Pt called in confirming the Physical Therapy is from the Quad muscle tendon repair / Nataliia Lazo/EMT-B

## 2021-10-08 NOTE — TELEPHONE ENCOUNTER
Can we verify what the therapy is for? I do not see the [facility] notes referring him recently in his chart. He recently has a surgery which may be the reason. Otherwise, has had back pain in the past. I just want to make sure we know what diagnosis to pair this with.     -joe espino, pac

## 2021-10-08 NOTE — TELEPHONE ENCOUNTER
Bethany from Therapy Insurance team with [facility] called at [phone].     She states that patient is an active patient with [facility] and was recently referred by one of the specialists to have physical therapy. Patient was seen for physical therapy on 10/5/21.     Patient's insurance recently switched to [employer]. They are requiring a referral/precert for physical therapy from his PCP for the physical therapy that was completed on 10/5/21.     Please fax referral to [phone] att: Therapy insurance team    Sherie Pierre RN on 10/8/2021 at 12:01 PM

## 2021-10-11 NOTE — TELEPHONE ENCOUNTER
Can we call his surgical team? It does not make since for us to order postoperative physical therapy. I have never heard of this before being an issue with insurance after surgery.     -joe espino, pac   well developed, well nourished , in no acute distress , ambulating without difficulty , normal communication ability

## 2021-10-11 NOTE — TELEPHONE ENCOUNTER
LM for Nola with [facility] to call Maria Isabel back.  [facility] is on our bypass and no referral needs to be facilitated to pt's insurance.      Maria Isabel-Referrals

## 2021-10-11 NOTE — TELEPHONE ENCOUNTER
Routed to referrals, see 9/27/21 encounter, can you confirm PT referral question? Can you help?  Nicolette Chambers, RN, BSN  Message handled by CLINIC NURSE.

## 2021-10-12 ENCOUNTER — TRANSFERRED RECORDS (OUTPATIENT)
Dept: HEALTH INFORMATION MANAGEMENT | Facility: CLINIC | Age: 46
End: 2021-10-12

## 2021-11-10 ENCOUNTER — TRANSFERRED RECORDS (OUTPATIENT)
Dept: HEALTH INFORMATION MANAGEMENT | Facility: CLINIC | Age: 46
End: 2021-11-10
Payer: COMMERCIAL

## 2021-11-14 ENCOUNTER — HEALTH MAINTENANCE LETTER (OUTPATIENT)
Age: 46
End: 2021-11-14

## 2021-12-21 ENCOUNTER — TRANSFERRED RECORDS (OUTPATIENT)
Dept: HEALTH INFORMATION MANAGEMENT | Facility: CLINIC | Age: 46
End: 2021-12-21
Payer: COMMERCIAL

## 2022-02-22 ENCOUNTER — TRANSFERRED RECORDS (OUTPATIENT)
Dept: HEALTH INFORMATION MANAGEMENT | Facility: CLINIC | Age: 47
End: 2022-02-22
Payer: COMMERCIAL

## 2022-04-26 ENCOUNTER — TRANSFERRED RECORDS (OUTPATIENT)
Dept: HEALTH INFORMATION MANAGEMENT | Facility: CLINIC | Age: 47
End: 2022-04-26
Payer: COMMERCIAL

## 2022-05-05 ENCOUNTER — TRANSFERRED RECORDS (OUTPATIENT)
Dept: HEALTH INFORMATION MANAGEMENT | Facility: CLINIC | Age: 47
End: 2022-05-05
Payer: COMMERCIAL

## 2022-06-01 ENCOUNTER — TRANSFERRED RECORDS (OUTPATIENT)
Dept: HEALTH INFORMATION MANAGEMENT | Facility: CLINIC | Age: 47
End: 2022-06-01
Payer: COMMERCIAL

## 2022-06-29 ENCOUNTER — TRANSFERRED RECORDS (OUTPATIENT)
Dept: FAMILY MEDICINE | Facility: CLINIC | Age: 47
End: 2022-06-29

## 2022-08-10 ENCOUNTER — TRANSFERRED RECORDS (OUTPATIENT)
Dept: FAMILY MEDICINE | Facility: CLINIC | Age: 47
End: 2022-08-10

## 2022-10-18 ENCOUNTER — OFFICE VISIT (OUTPATIENT)
Dept: FAMILY MEDICINE | Facility: CLINIC | Age: 47
End: 2022-10-18
Payer: COMMERCIAL

## 2022-10-18 ENCOUNTER — MYC MEDICAL ADVICE (OUTPATIENT)
Dept: FAMILY MEDICINE | Facility: CLINIC | Age: 47
End: 2022-10-18

## 2022-10-18 VITALS
HEIGHT: 72 IN | SYSTOLIC BLOOD PRESSURE: 130 MMHG | BODY MASS INDEX: 36.16 KG/M2 | DIASTOLIC BLOOD PRESSURE: 88 MMHG | TEMPERATURE: 98.4 F | HEART RATE: 83 BPM | OXYGEN SATURATION: 98 % | RESPIRATION RATE: 12 BRPM | WEIGHT: 267 LBS

## 2022-10-18 DIAGNOSIS — E03.9 HYPOTHYROIDISM, UNSPECIFIED TYPE: ICD-10-CM

## 2022-10-18 DIAGNOSIS — Z12.11 SCREEN FOR COLON CANCER: ICD-10-CM

## 2022-10-18 DIAGNOSIS — R60.0 LEG EDEMA, RIGHT: ICD-10-CM

## 2022-10-18 DIAGNOSIS — Z11.59 NEED FOR HEPATITIS C SCREENING TEST: ICD-10-CM

## 2022-10-18 DIAGNOSIS — Z00.00 ROUTINE GENERAL MEDICAL EXAMINATION AT A HEALTH CARE FACILITY: Primary | ICD-10-CM

## 2022-10-18 LAB
CHOLEST SERPL-MCNC: 181 MG/DL
D DIMER PPP FEU-MCNC: 0.97 UG/ML FEU (ref 0–0.5)
FASTING STATUS PATIENT QL REPORTED: ABNORMAL
FASTING STATUS PATIENT QL REPORTED: ABNORMAL
GLUCOSE BLD-MCNC: 106 MG/DL (ref 70–99)
HCV AB SERPL QL IA: NONREACTIVE
HDLC SERPL-MCNC: 40 MG/DL
LDLC SERPL CALC-MCNC: 97 MG/DL
NONHDLC SERPL-MCNC: 141 MG/DL
TRIGL SERPL-MCNC: 221 MG/DL
TSH SERPL DL<=0.005 MIU/L-ACNC: 1.44 MU/L (ref 0.4–4)

## 2022-10-18 PROCEDURE — 99396 PREV VISIT EST AGE 40-64: CPT | Performed by: PHYSICIAN ASSISTANT

## 2022-10-18 PROCEDURE — 86803 HEPATITIS C AB TEST: CPT | Performed by: PHYSICIAN ASSISTANT

## 2022-10-18 PROCEDURE — 36415 COLL VENOUS BLD VENIPUNCTURE: CPT | Performed by: PHYSICIAN ASSISTANT

## 2022-10-18 PROCEDURE — 85379 FIBRIN DEGRADATION QUANT: CPT | Performed by: PHYSICIAN ASSISTANT

## 2022-10-18 PROCEDURE — 82947 ASSAY GLUCOSE BLOOD QUANT: CPT | Performed by: PHYSICIAN ASSISTANT

## 2022-10-18 PROCEDURE — 80061 LIPID PANEL: CPT | Performed by: PHYSICIAN ASSISTANT

## 2022-10-18 PROCEDURE — 99213 OFFICE O/P EST LOW 20 MIN: CPT | Mod: 25 | Performed by: PHYSICIAN ASSISTANT

## 2022-10-18 PROCEDURE — 84443 ASSAY THYROID STIM HORMONE: CPT | Performed by: PHYSICIAN ASSISTANT

## 2022-10-18 NOTE — PROGRESS NOTES
SUBJECTIVE:   CC: Kodak is an 47 year old who presents for preventative health visit.       Patient has been advised of split billing requirements and indicates understanding: Yes  Healthy Habits:     Getting at least 3 servings of Calcium per day:  Yes    Bi-annual eye exam:  Yes    Dental care twice a year:  Yes    Sleep apnea or symptoms of sleep apnea:  None    Diet:  Regular (no restrictions)    Frequency of exercise:  6-7 days/week    Duration of exercise:  Less than 15 minutes    Taking medications regularly:  Yes    Barriers to taking medications:  None    Medication side effects:  None    PHQ-2 Total Score: 0    Additional concerns today:  No       Ongoing right knee pain after complications for surgery. Following tco. However, has not been able to be as active as would like and has noted more weight weight due to this.     States over the weekend was in [place] for daughter's softball tournament and standing often. Today noticed mild numbness in right lower leg and swelling. No history of similar symptoms. No shortness of breath or chest pains. Pain admited to behind knee but unchanged over time. Last surgery was in may of 2022.     Hypothyroidism Follow-up      Since last visit, patient describes the following symptoms: Weight stable, no hair loss, no skin changes, no constipation, no loose stools           Today's PHQ-2 Score:   PHQ-2 ( 1999 [employer]) 10/18/2022   Q1: Little interest or pleasure in doing things 0   Q2: Feeling down, depressed or hopeless 0   PHQ-2 Score 0   PHQ-2 Total Score (12-17 Years)- Positive if 3 or more points; Administer PHQ-A if positive -   Q1: Little interest or pleasure in doing things Not at all   Q2: Feeling down, depressed or hopeless Not at all   PHQ-2 Score 0       Abuse: Current or Past(Physical, Sexual or Emotional)- No  Do you feel safe in your environment? Yes        Social History     Tobacco Use     Smoking status: Never     Smokeless tobacco: Never   Substance  Use Topics     Alcohol use: Yes     Alcohol/week: 0.0 standard drinks     Comment: occasional         Alcohol Use 10/14/2020   Prescreen: >3 drinks/day or >7 drinks/week? No   Prescreen: >3 drinks/day or >7 drinks/week? -       Last PSA:   PSA   Date Value Ref Range Status   04/09/2010 0.30 0 - 4 ug/L Final       Reviewed orders with patient. Reviewed health maintenance and updated orders accordingly - Yes  BP Readings from Last 3 Encounters:   10/18/22 130/88   09/27/21 130/88   10/14/20 124/86    Wt Readings from Last 3 Encounters:   10/18/22 121.1 kg (267 lb)   09/27/21 113.4 kg (250 lb)   10/14/20 116.7 kg (257 lb 4.8 oz)                  Patient Active Problem List   Diagnosis     Family history of hemochromatosis     Hypothyroid     Lumbar radiculopathy     Past Surgical History:   Procedure Laterality Date     DISCECTOMY LUMBAR POSTERIOR MICROSCOPIC ONE LEVEL Right 4/2/2018    Procedure: DISCECTOMY LUMBAR POSTERIOR MICROSCOPIC ONE LEVEL;  Right L4-5 hemilaminectomy, medial facetectomy, foraminotomy with microdiscectomy ;  Surgeon: Suhail Segura MD;  Location: [facility] OR     ENT SURGERY       VASECTOMY         Social History     Tobacco Use     Smoking status: Never     Smokeless tobacco: Never   Substance Use Topics     Alcohol use: Yes     Alcohol/week: 0.0 standard drinks     Comment: occasional     Family History   Problem Relation Age of Onset     Obesity Mother      Hypertension Mother      Eye Disorder Mother         Nely's disease     Blood Disease Father         hematomacrosis     Diabetes Paternal Grandfather      Lipids Brother      Blood Disease Brother         hematomacrosis         Current Outpatient Medications   Medication Sig Dispense Refill     levothyroxine (SYNTHROID/LEVOTHROID) 125 MCG tablet TAKE 1 TABLET BY MOUTH EVERY DAY 90 tablet 0     Allergies   Allergen Reactions     No Known Drug Allergies      Recent Labs   Lab Test 09/27/21  0832 10/14/20  1016 06/13/17  1409  03/11/16  0935   LDL  --  130*  --  107*   HDL  --  42  --  54   TRIG  --  159*  --  62   ALT  --   --   --  55   CR  --   --   --  0.98   GFRESTIMATED  --   --   --  84   GFRESTBLACK  --   --   --  >90  African American GFR Calc     POTASSIUM  --   --   --  4.6   TSH 2.09 1.32   < > 2.05    < > = values in this interval not displayed.        Reviewed and updated as needed this visit by clinical staff   Tobacco  Allergies  Meds  Problems  Med Hx  Surg Hx  Fam Hx  Soc   Hx        Reviewed and updated as needed this visit by Provider   Tobacco  Allergies  Meds  Problems  Med Hx  Surg Hx  Fam Hx         Past Medical History:   Diagnosis Date     Tourette's disorder 1986      Past Surgical History:   Procedure Laterality Date     DISCECTOMY LUMBAR POSTERIOR MICROSCOPIC ONE LEVEL Right 4/2/2018    Procedure: DISCECTOMY LUMBAR POSTERIOR MICROSCOPIC ONE LEVEL;  Right L4-5 hemilaminectomy, medial facetectomy, foraminotomy with microdiscectomy ;  Surgeon: Suhail Segura MD;  Location: [facility] OR     ENT SURGERY       VASECTOMY         Review of Systems  CONSTITUTIONAL: NEGATIVE for fever, chills, change in weight  INTEGUMENTARY/SKIN: NEGATIVE for worrisome rashes, moles or lesions  EYES: NEGATIVE for vision changes or irritation  ENT: NEGATIVE for ear, mouth and throat problems  RESP: NEGATIVE for significant cough or SOB  CV: NEGATIVE for chest pain, palpitations or peripheral edema  GI: NEGATIVE for nausea, abdominal pain, heartburn, or change in bowel habits   male: negative for dysuria, hematuria, decreased urinary stream, erectile dysfunction, urethral discharge  MUSCULOSKELETAL: ongoing right knee pain and swelling. Now worsening left knee pain. Otherwise, swelling noted today. NEGATIVE for significant arthralgias or myalgia  NEURO: NEGATIVE for weakness, dizziness or paresthesias  PSYCHIATRIC: NEGATIVE for changes in mood or affect    OBJECTIVE:   /88 (BP Location: Right arm, Patient  Position: Sitting, Cuff Size: Adult Large)   Pulse 83   Temp 98.4  F (36.9  C) (Oral)   Resp 12   Ht 1.829 m (6')   Wt 121.1 kg (267 lb)   SpO2 98%   BMI 36.21 kg/m      Physical Exam  GENERAL: alert, no distress and obese  EYES: Eyes grossly normal to inspection, PERRL and conjunctivae and sclerae normal  HENT: ear canals and TM's normal, nose and mouth without ulcers or lesions  NECK: no adenopathy, no asymmetry, masses, or scars and thyroid normal to palpation  RESP: lungs clear to auscultation - no rales, rhonchi or wheezes  CV: regular rate and rhythm, normal S1 S2, no S3 or S4, no murmur, click or rub,  and peripheral pulses strong  ABDOMEN: soft, nontender, no hepatosplenomegaly, no masses and bowel sounds normal  MS: no gross musculoskeletal defects noted. Trace pitting edema noted on right ankle. Calf on right 43 cm diameter. Left 46 cm diameter.   SKIN: no suspicious lesions or rashes  NEURO: Normal strength and tone, mentation intact and speech normal  PSYCH: mentation appears normal, affect normal/bright  LYMPH: no cervical adenopathy    Diagnostic Test Results:  none     ASSESSMENT/PLAN:   (Z00.00) Routine general medical examination at a health care facility  (primary encounter diagnosis)  Comment: stable exam. Discussed diet and exercise changes. Limited due to knee pain.   Plan: Lipid panel reflex to direct LDL Fasting,         Glucose            (E03.9) Hypothyroidism, unspecified type  Comment: labs pending. If stable, recheck annually.   Plan: TSH WITH FREE T4 REFLEX            (R60.0) Leg edema, right  Comment: noted on exam and new onset. Recent travel and ortho surgery in the last 5 months. Consider dvt. Well's score is 0. Will obtain d dimer. If positive, ultrasound to be obtained. Otherwise, reassured. Likely overuse and monitoring can be done. Of note, right leg was actually 3 cm smaller in diameter than left. Possible due to muscle atrophy from his recent surgery.   Plan: D dimer,  quantitative            (Z12.11) Screen for colon cancer  Comment: due.   Plan: Colonoscopy Screening  Referral            (Z11.59) Need for hepatitis C screening test  Comment:   Plan: Hepatitis C Screen Reflex to HCV RNA Quant and         Genotype              Patient has been advised of split billing requirements and indicates understanding: Yes    COUNSELING:   Reviewed preventive health counseling, as reflected in patient instructions       Regular exercise       Healthy diet/nutrition       Consider Hep C screening for all patients one time for ages 18-79 years       HIV screeninx in teen years, 1x in adult years, and at intervals if high risk       Colorectal cancer screening    Estimated body mass index is 36.21 kg/m  as calculated from the following:    Height as of this encounter: 1.829 m (6').    Weight as of this encounter: 121.1 kg (267 lb).     Weight management plan: Discussed healthy diet and exercise guidelines    He reports that he has never smoked. He has never used smokeless tobacco.      Counseling Resources:  ATP IV Guidelines  Pooled Cohorts Equation Calculator  FRAX Risk Assessment  ICSI Preventive Guidelines  Dietary Guidelines for Americans,   USDA's MyPlate  ASA Prophylaxis  Lung CA Screening    SUZANNE Ramirez [facility]

## 2022-10-19 ENCOUNTER — TELEPHONE (OUTPATIENT)
Dept: FAMILY MEDICINE | Facility: CLINIC | Age: 47
End: 2022-10-19
Payer: COMMERCIAL

## 2022-10-19 ENCOUNTER — HOSPITAL ENCOUNTER (OUTPATIENT)
Dept: ULTRASOUND IMAGING | Facility: CLINIC | Age: 47
Discharge: HOME | End: 2022-10-19
Attending: INTERNAL MEDICINE | Admitting: INTERNAL MEDICINE
Payer: COMMERCIAL

## 2022-10-19 ENCOUNTER — OFFICE VISIT (OUTPATIENT)
Dept: PEDIATRICS | Facility: CLINIC | Age: 47
End: 2022-10-19
Attending: PHYSICIAN ASSISTANT
Payer: COMMERCIAL

## 2022-10-19 VITALS
TEMPERATURE: 98.2 F | HEART RATE: 66 BPM | SYSTOLIC BLOOD PRESSURE: 133 MMHG | BODY MASS INDEX: 36.21 KG/M2 | DIASTOLIC BLOOD PRESSURE: 89 MMHG | WEIGHT: 267 LBS | RESPIRATION RATE: 16 BRPM | OXYGEN SATURATION: 99 %

## 2022-10-19 DIAGNOSIS — R60.0 LEG EDEMA, RIGHT: Primary | ICD-10-CM

## 2022-10-19 DIAGNOSIS — R79.89 ELEVATED D-DIMER: ICD-10-CM

## 2022-10-19 DIAGNOSIS — R60.0 LEG EDEMA, RIGHT: ICD-10-CM

## 2022-10-19 DIAGNOSIS — R79.89 ELEVATED D-DIMER: Primary | ICD-10-CM

## 2022-10-19 DIAGNOSIS — M71.21 POPLITEAL CYST, RIGHT: ICD-10-CM

## 2022-10-19 DIAGNOSIS — R25.2 LEG CRAMPS: ICD-10-CM

## 2022-10-19 PROCEDURE — 99207 PR NON-BILLABLE SERV PER CHARTING: CPT | Performed by: PHYSICIAN ASSISTANT

## 2022-10-19 PROCEDURE — 99214 OFFICE O/P EST MOD 30 MIN: CPT | Performed by: INTERNAL MEDICINE

## 2022-10-19 PROCEDURE — 93971 EXTREMITY STUDY: CPT | Mod: RT

## 2022-10-19 RX ORDER — MAGNESIUM OXIDE 400 MG/1
400 TABLET ORAL DAILY
COMMUNITY
Start: 2022-10-19

## 2022-10-19 NOTE — TELEPHONE ENCOUNTER
Spoke to patient. dvt rule out needed due to elevated d dimer. Patient agrees. No chest pains or shortness of breath. No change in swelling since yesterday. Spoke to [facility] who agrees. They will call patient.       Referral to Acute and Diagnostic Services    The [facility] has been contacted at [phone] ([place]) to confirm patient acceptance. The transition to Acute & Diagnostic Services Clinic has been discussed with patient, and he agrees with next level of care.  Patient understands that evaluation/treatment at [facility] typically takes significantly longer than in clinic/urgent care (>2 hours).          Special issues:  None        Referral placed: Yes  Patient has transportation arranged and will travel to the [facility] without delay: Yes  Patient aware not to eat or drink. Yes    The following provider has assessed this patient for intervention at [facility], and directed the patient for referral: SUZANNE Ramirez PA-C

## 2022-10-19 NOTE — PATIENT INSTRUCTIONS
PLAN:  1.  Elevate right leg as needed/possible  2.  Stop use of R knee sleeve when not needed.  3.  Trial of calcium 500 mg twice daily for leg cramps

## 2022-10-19 NOTE — PROGRESS NOTES
ASSESSMENT:     1.  Dependent edema right lower extremity since surgery last spring.  No DVT.   Suspect   2.  R popliteal cyst present  3.  Atrophy R leg after surgeries, patient continues on Physical Therapy     4.  Chronic leg cramps, helped some with OTC magnesium.    PLAN:  1.  Elevate right leg as needed/possible  2.  Stop use of R knee sleeve when not needed.  3.  Trial of calcium 500 mg twice daily for leg cramps     Frank Candelario is a 47 year old, presenting for the following health issues:  Leg Swelling      HPI     R lower leg edema  Onset/Duration: Since R leg surgery in May, swelling comes and goes, mild. Has had surgery pain, unable to tell how long this whole thing has been going on  Recent travel up to 8 hours one way, no clear worsening.  Description  Location: leg - RIGHT  Swelling: YES- ankle, but has gone down from yesterday.  Redness: no   Pain: moderate behind the R knee, intermittent after surgery, no worse since travel.   Rates 3-4/10  Warmth: no   Progression of Symptoms:  same  Accompanying signs and symptoms:   Fevers: no   Numbness/tingling/weakness: YES, in R leg - Since surgery  History  Trauma to the area: no   Previous history of Gout: no    Alcohol usage: 1 drink a night.  Diuretic Use: no   Recent illness:  no   Previous similar problem:no    Previous evaluation:  no   Aggravating factors include: sitting for long periods of time.  Therapies tried and outcome:  Ibuprofen    Component Ref Range & Units 1 d ago (10/18/22)     D-Dimer Quantitative 0.00 - 0.50 ug/mL FEU 0.97 High     Resulting Agency  [facility]       No family history of blood clots  Nonsmoker   no added salt diet     Review of Systems   negative       Objective    /89   Pulse 66   Temp 98.2  F (36.8  C) (Oral)   Resp 16   Wt 121.1 kg (267 lb)   SpO2 99%   BMI 36.21 kg/m    Body mass index is 36.21 kg/m .  Physical Exam   GENERAL: healthy, alert and no distress  NECK: no adenopathy, no asymmetry, masses,  or scars and thyroid normal to palpation  RESP: lungs clear to auscultation - no rales, rhonchi or wheezes  CV: regular rate and rhythm, no murmur, click or rub, no peripheral edema and peripheral pulses strong  ABDOMEN: soft, nontender  MS: calves nontender, popliteal area normal without tenderness  R calf is ~ 2 cm smaller than left (atrophy postop) without edema  Trace edema around     US Lower Extremity Venous Duplex Right    Result Date: 10/19/2022  ULTRASOUND RIGHT LOWER EXTREMITY VENOUS DUPLEX  10/19/2022 2:06 PM CLINICAL HISTORY/INDICATION: Elevated D-dimer. Leg edema, right. COMPARISON: None relevant. TECHNIQUE: Grayscale, color-flow, and spectral waveform analysis were performed of the deep veins of the right lower extremity FINDINGS: The right common femoral vein, femoral vein, popliteal vein, posterior tibial vein and peroneal vein demonstrate normal compressibility, spectral waveform, color flow and augmentation. 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 Fluid collection noted in the right popliteal fossa measuring 4.0 x 2.8 x 3.8 cm, likely popliteal fossa cyst. The contralateral left common femoral vein demonstrates normal compressibility, spectral waveform, color flow and augmentation.     IMPRESSION: 1. No deep vein thrombosis in the right lower extremity. 2. Right popliteal fossa cyst. TRINA ARAGON DO   SYSTEM ID:  [id number]

## 2022-11-08 ENCOUNTER — TRANSFERRED RECORDS (OUTPATIENT)
Dept: HEALTH INFORMATION MANAGEMENT | Facility: CLINIC | Age: 47
End: 2022-11-08

## 2023-01-03 DIAGNOSIS — E03.1 CONGENITAL HYPOTHYROIDISM WITHOUT GOITER: ICD-10-CM

## 2023-01-03 RX ORDER — LEVOTHYROXINE SODIUM 125 UG/1
TABLET ORAL
Qty: 90 TABLET | Refills: 0 | Status: SHIPPED | OUTPATIENT
Start: 2023-01-03 | End: 2023-04-03

## 2023-01-03 NOTE — TELEPHONE ENCOUNTER
Prescription approved per [facility] Refill Protocol.    Brooke ESPOSITO RN, BSN, PHN  [facility]

## 2023-01-12 ENCOUNTER — TRANSFERRED RECORDS (OUTPATIENT)
Dept: HEALTH INFORMATION MANAGEMENT | Facility: CLINIC | Age: 48
End: 2023-01-12

## 2023-01-23 ENCOUNTER — TRANSFERRED RECORDS (OUTPATIENT)
Dept: HEALTH INFORMATION MANAGEMENT | Facility: CLINIC | Age: 48
End: 2023-01-23

## 2023-02-06 ENCOUNTER — TRANSFERRED RECORDS (OUTPATIENT)
Dept: HEALTH INFORMATION MANAGEMENT | Facility: CLINIC | Age: 48
End: 2023-02-06
Payer: COMMERCIAL

## 2023-04-01 DIAGNOSIS — E03.1 CONGENITAL HYPOTHYROIDISM WITHOUT GOITER: ICD-10-CM

## 2023-04-03 RX ORDER — LEVOTHYROXINE SODIUM 125 UG/1
TABLET ORAL
Qty: 90 TABLET | Refills: 1 | Status: SHIPPED | OUTPATIENT
Start: 2023-04-03 | End: 2023-09-26

## 2023-04-07 ENCOUNTER — TRANSFERRED RECORDS (OUTPATIENT)
Dept: HEALTH INFORMATION MANAGEMENT | Facility: CLINIC | Age: 48
End: 2023-04-07
Payer: COMMERCIAL

## 2023-06-01 ENCOUNTER — TRANSFERRED RECORDS (OUTPATIENT)
Dept: HEALTH INFORMATION MANAGEMENT | Facility: CLINIC | Age: 48
End: 2023-06-01
Payer: COMMERCIAL

## 2023-06-07 ENCOUNTER — TRANSFERRED RECORDS (OUTPATIENT)
Dept: HEALTH INFORMATION MANAGEMENT | Facility: CLINIC | Age: 48
End: 2023-06-07
Payer: COMMERCIAL

## 2023-08-07 ENCOUNTER — OFFICE VISIT (OUTPATIENT)
Dept: URGENT CARE | Facility: URGENT CARE | Age: 48
End: 2023-08-07
Payer: COMMERCIAL

## 2023-08-07 VITALS
TEMPERATURE: 98.1 F | WEIGHT: 273.9 LBS | OXYGEN SATURATION: 98 % | SYSTOLIC BLOOD PRESSURE: 155 MMHG | HEART RATE: 66 BPM | DIASTOLIC BLOOD PRESSURE: 82 MMHG | BODY MASS INDEX: 37.15 KG/M2

## 2023-08-07 DIAGNOSIS — L23.7 CONTACT DERMATITIS DUE TO POISON IVY: Primary | ICD-10-CM

## 2023-08-07 PROCEDURE — 99213 OFFICE O/P EST LOW 20 MIN: CPT | Performed by: FAMILY MEDICINE

## 2023-08-07 RX ORDER — PREDNISONE 20 MG/1
TABLET ORAL
Qty: 26 TABLET | Refills: 0 | Status: SHIPPED | OUTPATIENT
Start: 2023-08-07 | End: 2023-08-22

## 2023-08-07 NOTE — PROGRESS NOTES
ICD-10-CM    1. Contact dermatitis due to poison ivy  L23.7 predniSONE (DELTASONE) 20 MG tablet        Multiple large patches of contact dermatitis, some still with a few vesicular lesions.  No evidence of secondary bacterial infection noted on today's exam.  Given persistent symptoms despite topical steroids and widespread areas affected, will do long prednisone taper over the next 15 days.    PLAN:  See Rx in Epic for taper details.  Patient Instructions   Prednisone taper per instructions.    Use Claritin or Zyrtec (or generics) daily for the next week.  Can also use Benadryl at bedtime if needed to reduce itching.      SUBJECTIVE:  Kodak Crowe is a 48 year old male who presents to  today with poison ivy for 2 weeks.  He has been using Cortisone 10 cream, which helps a little.  Initially things seemed to be improving, but then they have flared up again and have been intensely itchy.  Areas on arms, abdomen, legs.    OBJECTIVE:  BP (!) 155/82 (BP Location: Left arm, Patient Position: Sitting, Cuff Size: Adult Regular)   Pulse 66   Temp 98.1  F (36.7  C) (Tympanic)   Wt 124.2 kg (273 lb 14.4 oz)   SpO2 98%   BMI 37.15 kg/m    GEN: well-appearing, in NAD  Skin: multiple irregular erythematous patches on arms, legs, lower abdomen with healing vesicular lesions and few tiny ones still fluid-filled.  No significant crusting noted.  NO red streaks extending out from any of the lesions.  Most active area in R popliteal fossa.

## 2023-08-07 NOTE — PATIENT INSTRUCTIONS
Prednisone taper per instructions.    Use Claritin or Zyrtec (or generics) daily for the next week.  Can also use Benadryl at bedtime if needed to reduce itching.

## 2023-09-18 ENCOUNTER — PATIENT OUTREACH (OUTPATIENT)
Dept: CARE COORDINATION | Facility: CLINIC | Age: 48
End: 2023-09-18
Payer: COMMERCIAL

## 2023-09-26 DIAGNOSIS — E03.1 CONGENITAL HYPOTHYROIDISM WITHOUT GOITER: ICD-10-CM

## 2023-09-26 RX ORDER — LEVOTHYROXINE SODIUM 125 UG/1
125 TABLET ORAL DAILY
Qty: 90 TABLET | Refills: 0 | Status: SHIPPED | OUTPATIENT
Start: 2023-09-26 | End: 2023-12-26

## 2023-10-02 ENCOUNTER — PATIENT OUTREACH (OUTPATIENT)
Dept: CARE COORDINATION | Facility: CLINIC | Age: 48
End: 2023-10-02
Payer: COMMERCIAL

## 2023-11-26 ENCOUNTER — HEALTH MAINTENANCE LETTER (OUTPATIENT)
Age: 48
End: 2023-11-26

## 2023-12-18 ENCOUNTER — TRANSFERRED RECORDS (OUTPATIENT)
Dept: HEALTH INFORMATION MANAGEMENT | Facility: CLINIC | Age: 48
End: 2023-12-18
Payer: COMMERCIAL

## 2023-12-23 DIAGNOSIS — E03.1 CONGENITAL HYPOTHYROIDISM WITHOUT GOITER: ICD-10-CM

## 2023-12-26 RX ORDER — LEVOTHYROXINE SODIUM 125 UG/1
125 TABLET ORAL DAILY
Qty: 90 TABLET | Refills: 0 | Status: SHIPPED | OUTPATIENT
Start: 2023-12-26 | End: 2024-05-16

## 2023-12-26 NOTE — TELEPHONE ENCOUNTER
Maricruz refill sent. Needs appointment, note sent to pharmacy to inform patient per protocol.  Conrad MARIANO RN 12/26/2023 at 9:42 AM

## 2024-03-25 DIAGNOSIS — E03.1 CONGENITAL HYPOTHYROIDISM WITHOUT GOITER: ICD-10-CM

## 2024-03-25 RX ORDER — LEVOTHYROXINE SODIUM 125 UG/1
TABLET ORAL
Qty: 90 TABLET | Refills: 0 | OUTPATIENT
Start: 2024-03-25

## 2024-04-15 RX ORDER — LEVOTHYROXINE SODIUM 125 UG/1
125 TABLET ORAL DAILY
Qty: 90 TABLET | Refills: 0 | OUTPATIENT
Start: 2024-04-15

## 2024-04-15 NOTE — TELEPHONE ENCOUNTER
Pt is scheduled for soonest available appt with Rebeca Solano on 5/16/2024. Pt is requesting enough meds to get to that appt.     Routing to [facility] refill pool as  wouldn't address until after est appt is completed.    Shannan Villegas

## 2024-04-29 NOTE — TELEPHONE ENCOUNTER
Request is NOT a duplicate, Pt is schedule for soonest available with provider, requesting enough meds to get to that est care appt    Shannan Villegas

## 2024-05-10 SDOH — HEALTH STABILITY: PHYSICAL HEALTH: ON AVERAGE, HOW MANY MINUTES DO YOU ENGAGE IN EXERCISE AT THIS LEVEL?: 90 MIN

## 2024-05-10 SDOH — HEALTH STABILITY: PHYSICAL HEALTH: ON AVERAGE, HOW MANY DAYS PER WEEK DO YOU ENGAGE IN MODERATE TO STRENUOUS EXERCISE (LIKE A BRISK WALK)?: 6 DAYS

## 2024-05-10 ASSESSMENT — SOCIAL DETERMINANTS OF HEALTH (SDOH): HOW OFTEN DO YOU GET TOGETHER WITH FRIENDS OR RELATIVES?: MORE THAN THREE TIMES A WEEK

## 2024-05-16 ENCOUNTER — OFFICE VISIT (OUTPATIENT)
Dept: FAMILY MEDICINE | Facility: CLINIC | Age: 49
End: 2024-05-16
Payer: COMMERCIAL

## 2024-05-16 VITALS
BODY MASS INDEX: 35.62 KG/M2 | WEIGHT: 263 LBS | HEART RATE: 66 BPM | HEIGHT: 72 IN | RESPIRATION RATE: 16 BRPM | DIASTOLIC BLOOD PRESSURE: 82 MMHG | TEMPERATURE: 98.2 F | SYSTOLIC BLOOD PRESSURE: 118 MMHG | OXYGEN SATURATION: 100 %

## 2024-05-16 DIAGNOSIS — F95.2 TOURETTE'S SYNDROME: ICD-10-CM

## 2024-05-16 DIAGNOSIS — E03.1 CONGENITAL HYPOTHYROIDISM WITHOUT GOITER: ICD-10-CM

## 2024-05-16 DIAGNOSIS — Z00.00 ROUTINE GENERAL MEDICAL EXAMINATION AT A HEALTH CARE FACILITY: Primary | ICD-10-CM

## 2024-05-16 DIAGNOSIS — Z83.49 FAMILY HISTORY OF HEMOCHROMATOSIS: ICD-10-CM

## 2024-05-16 DIAGNOSIS — R03.0 ELEVATED BLOOD PRESSURE READING WITHOUT DIAGNOSIS OF HYPERTENSION: ICD-10-CM

## 2024-05-16 DIAGNOSIS — E03.9 HYPOTHYROIDISM, UNSPECIFIED TYPE: ICD-10-CM

## 2024-05-16 PROBLEM — M54.16 LUMBAR RADICULOPATHY: Status: RESOLVED | Noted: 2017-10-13 | Resolved: 2024-05-16

## 2024-05-16 PROBLEM — K63.5 POLYP OF COLON: Status: ACTIVE | Noted: 2023-02-06

## 2024-05-16 PROBLEM — K64.9 HEMORRHOIDS: Status: ACTIVE | Noted: 2023-02-06

## 2024-05-16 PROCEDURE — 99396 PREV VISIT EST AGE 40-64: CPT | Performed by: NURSE PRACTITIONER

## 2024-05-16 RX ORDER — LEVOTHYROXINE SODIUM 125 UG/1
125 TABLET ORAL DAILY
Qty: 90 TABLET | Refills: 0 | Status: SHIPPED | OUTPATIENT
Start: 2024-05-16 | End: 2024-08-09

## 2024-05-16 ASSESSMENT — PAIN SCALES - GENERAL: PAINLEVEL: NO PAIN (0)

## 2024-05-16 ASSESSMENT — ENCOUNTER SYMPTOMS
PALPITATIONS: 0
DIARRHEA: 0
CHILLS: 0
UNEXPECTED WEIGHT CHANGE: 0
DYSURIA: 0
SHORTNESS OF BREATH: 0
ABDOMINAL PAIN: 0
CONSTIPATION: 0
FEVER: 0
CHEST TIGHTNESS: 0

## 2024-05-16 NOTE — ASSESSMENT & PLAN NOTE
Improved since childhood.  Currently has slight recurring throat clearing that he attributes to tourette's

## 2024-05-16 NOTE — ASSESSMENT & PLAN NOTE
Discussed diet and exercise.  Discussed ETOH as a calorie contributor.  Patient is working on exercise as well as diet modification.

## 2024-05-16 NOTE — ASSESSMENT & PLAN NOTE
No symptoms of hypo or hyperthyroidism: no decreased or increased weight, no feeling cold/chilly or excessively warm, no diarrhea or constipation, no undue sweatiness, anxiety or palpitations.  Has been off thyroid medication for nearly a month as he was out of medication.  Refill given for 90 days and pt will repeat TSH in 8 weeks.

## 2024-05-16 NOTE — PATIENT INSTRUCTIONS
"Please recheck your labs in 8 weeks.    Preventive Care Advice   This is general advice we often give to help people stay healthy. Your care team may have specific advice just for you. Please talk to your care team about your own preventive care needs.  Lifestyle  Exercise at least 150 minutes each week (30 minutes a day, 5 days a week).  Do muscle strengthening activities 2 days a week. These help control your weight and prevent disease.  No smoking.  Wear sunscreen to prevent skin cancer.  Have your home tested for radon every 2 to 5 years. Radon is a colorless, odorless gas that can harm your lungs. To learn more, go to www.health.[facility].mn.us and search for \"Radon in Homes.\"  Keep guns unloaded and locked up in a safe place like a safe or gun vault, or, use a gun lock and hide the keys. Always lock away bullets separately. To learn more, visit [employer].[place].gov and search for \"safe gun storage.\"  Nutrition  Eat 5 or more servings of fruits and vegetables each day.  Try wheat bread, brown rice and whole grain pasta (instead of white bread, rice, and pasta).  Get enough calcium and vitamin D. Check the label on foods and aim for 100% of the RDA (recommended daily allowance).  Regular exams  Have a dental exam and cleaning every 6 months.  See your health care team every year to talk about:  Any changes in your health.  Any medicines your care team has prescribed.  Preventive care, family planning, and ways to prevent chronic diseases.  Shots (vaccines)   HPV shots (up to age 26), if you've never had them before.  Hepatitis B shots (up to age 59), if you've never had them before.  COVID-19 shot: Get this shot when it's due.  Flu shot: Get a flu shot every year.  Tetanus shot: Get a tetanus shot every 10 years.  Pneumococcal, hepatitis A, and RSV shots: Ask your care team if you need these based on your risk.  Shingles shot (for age 50 and up).  General health tests  Diabetes screening:  Starting at age 35, Get screened for " diabetes at least every 3 years.  If you are younger than age 35, ask your care team if you should be screened for diabetes.  Cholesterol test: At age 39, start having a cholesterol test every 5 years, or more often if advised.  Bone density scan (DEXA): At age 50, ask your care team if you should have this scan for osteoporosis (brittle bones).  Hepatitis C: Get tested at least once in your life.  Abdominal aortic aneurysm screening: Talk to your doctor about having this screening if you:  Have ever smoked; and  Are biologically male; and  Are between the ages of 65 and 75.  STIs (sexually transmitted infections)  Before age 24: Ask your care team if you should be screened for STIs.  After age 24: Get screened for STIs if you're at risk. You are at risk for STIs (including HIV) if:  You are sexually active with more than one person.  You don't use condoms every time.  You or a partner was diagnosed with a sexually transmitted infection.  If you are at risk for HIV, ask about PrEP medicine to prevent HIV.  Get tested for HIV at least once in your life, whether you are at risk for HIV or not.  Cancer screening tests  Cervical cancer screening: If you have a cervix, begin getting regular cervical cancer screening tests at age 21. Most people who have regular screenings with normal results can stop after age 65. Talk about this with your provider.  Breast cancer scan (mammogram): If you've ever had breasts, begin having regular mammograms starting at age 40. This is a scan to check for breast cancer.  Colon cancer screening: It is important to start screening for colon cancer at age 45.  Have a colonoscopy test every 10 years (or more often if you're at risk) Or, ask your provider about stool tests like a FIT test every year or Cologuard test every 3 years.  To learn more about your testing options, visit: www.[facility]/603945.pdf.  For help making a decision, visit: jen/vx57321.  Prostate cancer screening test:  If you have a prostate and are age 55 to 69, ask your provider if you would benefit from a yearly prostate cancer screening test.  Lung cancer screening: If you are a current or former smoker age 50 to 80, ask your care team if ongoing lung cancer screenings are right for you.  For informational purposes only. Not to replace the advice of your health care provider. Copyright   2023 [facility]. All rights reserved. Clinically reviewed by the [facility] Transitions Program. [facility] [id number] - REV 04/24.    Learning About Stress  What is stress?     Stress is your body's response to a hard situation. Your body can have a physical, emotional, or mental response. Stress is a fact of life for most people, and it affects everyone differently. What causes stress for you may not be stressful for someone else.  A lot of things can cause stress. You may feel stress when you go on a job interview, take a test, or run a race. This kind of short-term stress is normal and even useful. It can help you if you need to work hard or react quickly. For example, stress can help you finish an important job on time.  Long-term stress is caused by ongoing stressful situations or events. Examples of long-term stress include long-term health problems, ongoing problems at work, or conflicts in your family. Long-term stress can harm your health.  How does stress affect your health?  When you are stressed, your body responds as though you are in danger. It makes hormones that speed up your heart, make you breathe faster, and give you a burst of energy. This is called the fight-or-flight stress response. If the stress is over quickly, your body goes back to normal and no harm is done.  But if stress happens too often or lasts too long, it can have bad effects. Long-term stress can make you more likely to get sick, and it can make symptoms of some diseases worse. If you tense up when you are stressed, you may develop neck,  shoulder, or low back pain. Stress is linked to high blood pressure and heart disease.  Stress also harms your emotional health. It can make you salguero, tense, or depressed. Your relationships may suffer, and you may not do well at work or school.  What can you do to manage stress?  You can try these things to help manage stress:   Do something active. Exercise or activity can help reduce stress. Walking is a great way to get started. Even everyday activities such as housecleaning or yard work can help.  Try yoga or jimbo chi. These techniques combine exercise and meditation. You may need some training at first to learn them.  Do something you enjoy. For example, listen to music or go to a movie. Practice your hobby or do volunteer work.  Meditate. This can help you relax, because you are not worrying about what happened before or what may happen in the future.  Do guided imagery. Imagine yourself in any setting that helps you feel calm. You can use online videos, books, or a teacher to guide you.  Do breathing exercises. For example:  From a standing position, bend forward from the waist with your knees slightly bent. Let your arms dangle close to the floor.  Breathe in slowly and deeply as you return to a standing position. Roll up slowly and lift your head last.  Hold your breath for just a few seconds in the standing position.  Breathe out slowly and bend forward from the waist.  Let your feelings out. Talk, laugh, cry, and express anger when you need to. Talking with supportive friends or family, a counselor, or a katherine leader about your feelings is a healthy way to relieve stress. Avoid discussing your feelings with people who make you feel worse.  Write. It may help to write about things that are bothering you. This helps you find out how much stress you feel and what is causing it. When you know this, you can find better ways to cope.  What can you do to prevent stress?  You might try some of these things to  "help prevent stress:  Manage your time. This helps you find time to do the things you want and need to do.  Get enough sleep. Your body recovers from the stresses of the day while you are sleeping.  Get support. Your family, friends, and community can make a difference in how you experience stress.  Limit your news feed. Avoid or limit time on social media or news that may make you feel stressed.  Do something active. Exercise or activity can help reduce stress. Walking is a great way to get started.  Where can you learn more?  Go to https://www.[employer].net/patiented  Enter N032 in the search box to learn more about \"Learning About Stress.\"  Current as of: October 24, 2023               Content Version: 14.0    3366-8526 [employer].   Care instructions adapted under license by your healthcare professional. If you have questions about a medical condition or this instruction, always ask your healthcare professional. Healthwise, [employer] disclaims any warranty or liability for your use of this information.      "

## 2024-05-16 NOTE — PROGRESS NOTES
Assessment & Plan     ICD-10-CM    1. Routine general medical examination at a health care facility  Z00.00       2. 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 Family history of hemochromatosis  Z83.49       4. Elevated blood pressure reading without diagnosis of hypertension    Labs today and Follow-up as indicated  R03.0 Comprehensive metabolic panel (BMP + Alb, Alk Phos, ALT, AST, Total. Bili, TP)      5. Hypothyroidism, unspecified type  E03.9 TSH with free T4 reflex      6. Tourette's syndrome  F95.2       7. BMI 35.0-35.9,adult    Continue diet and exercise.  Has reduced ETOH from one nightly to one per day on weekends Z68.35             EUSEBIO Deleon CNP  M [facility] [place]  ============================================  Subjective   Kodak BERTHA Crowe is a 49 year old male   here for a Physical Exam    Tourette's syndrome  Improved since childhood.  Currently has slight recurring throat clearing that he attributes to tourette's    Hypothyroid  No symptoms of hypo or hyperthyroidism: no decreased or increased weight, no feeling cold/chilly or excessively warm, no diarrhea or constipation, no undue sweatiness, anxiety or palpitations.  Has been off thyroid medication for nearly a month as he was out of medication.  Refill given for 90 days and pt will repeat TSH in 8 weeks.    BMI 35.0-35.9,adult  Discussed diet and exercise.  Discussed ETOH as a calorie contributor.  Patient is working on exercise as well as diet modification.      Health Care Directive    Patient Care Team:  Flora Solano APRN CNP as PCP - General  Buddy Gleason PA-C as Assigned PCP        5/10/2024   General Health   How would you rate your overall physical health? Good   Feel stress (tense, anxious, or unable to sleep) To some extent   (!) STRESS CONCERN      5/10/2024   Nutrition   Three or  more servings of calcium each day? Yes   Diet: Regular (no restrictions)   How many servings of fruit and vegetables per day? (!) 0-1   How many sweetened beverages each day? 0-1         5/10/2024   Exercise   Days per week of moderate/strenous exercise 6 days   Average minutes spent exercising at this level 90 min           5/10/2024   Social Factors   Frequency of gathering with friends or relatives More than three times a week   Worry food won't last until get money to buy more No   Food not last or not have enough money for food? No   Do you have housing?  Yes   Are you worried about losing your housing? No   Lack of transportation? No   Unable to get utilities (heat,electricity)? No         5/10/2024   Dental   Dentist two times every year? Yes         5/10/2024   TB Screening   Were you born outside of the [place]? No         Today's PHQ-2 Score:       5/16/2024     6:46 AM   PHQ-2 ( 1999 [employer])   Q1: Little interest or pleasure in doing things 0   Q2: Feeling down, depressed or hopeless 0   PHQ-2 Score 0   Q1: Little interest or pleasure in doing things Not at all   Q2: Feeling down, depressed or hopeless Not at all   PHQ-2 Score 0             5/10/2024   Substance Use   Alcohol more than 3/day or more than 7/wk No   Do you use any other substances recreationally? No       Social History     Tobacco Use    Smoking status: Never     Passive exposure: Never    Smokeless tobacco: Never   Vaping Use    Vaping status: Never Used   Substance Use Topics    Alcohol use: Yes     Comment: 1 drink a night on weekends    Drug use: No             5/10/2024   STI Screening   New sexual partner(s) since last STI/HIV test? No   ASCVD Risk   The 10-year ASCVD risk score (Jodee MARTÍNEZ, et al., 2019) is: 2.7%    Values used to calculate the score:      Age: 49 years      Sex: Male      Is Non- : No      Diabetic: No      Tobacco smoker: No      Systolic Blood Pressure: 118 mmHg      Is BP treated: No       HDL Cholesterol: 40 mg/dL      Total Cholesterol: 181 mg/dL       The Following Health Maintenance Topics are reviewed and are correct as of today:  Health Maintenance   Topic Date Due    TSH W/FREE T4 REFLEX  10/18/2023    YEARLY PREVENTIVE VISIT  05/16/2025    ANNUAL REVIEW OF HM ORDERS  05/16/2025    GLUCOSE  10/18/2025    ADVANCE CARE PLANNING  09/27/2026    LIPID  10/18/2027    DTAP/TDAP/TD IMMUNIZATION (4 - Td or Tdap) 10/14/2030    COLORECTAL CANCER SCREENING  02/06/2033    HEPATITIS C SCREENING  Completed    PHQ-2 (once per calendar year)  Completed    INFLUENZA VACCINE  Completed    Pneumococcal Vaccine: Pediatrics (0 to 5 Years) and At-Risk Patients (6 to 64 Years)  Aged Out    IPV IMMUNIZATION  Aged Out    HPV IMMUNIZATION  Aged Out    MENINGITIS IMMUNIZATION  Aged Out    RSV MONOCLONAL ANTIBODY  Aged Out    HIV SCREENING  Discontinued    HEPATITIS B IMMUNIZATION  Discontinued    COVID-19 Vaccine  Discontinued     Reviewed and updated as needed this visit by Provider   Tobacco  Allergies  Meds  Problems  Med Hx  Surg Hx  Fam Hx           HPI  Review of Systems   Constitutional:  Negative for chills, fever and unexpected weight change.   HENT: Negative.     Respiratory:  Negative for chest tightness and shortness of breath.    Cardiovascular:  Negative for chest pain and palpitations.   Gastrointestinal:  Negative for abdominal pain, constipation and diarrhea.   Genitourinary:  Negative for dysuria.   Skin:  Negative for rash.     ============================================    Objective    Exam  /82 (BP Location: Right arm, Patient Position: Sitting, Cuff Size: Adult Large)   Pulse 66   Temp 98.2  F (36.8  C) (Oral)   Resp 16   Ht 1.829 m (6')   Wt 119.3 kg (263 lb)   SpO2 100%   BMI 35.67 kg/m    Physical Exam  Constitutional:       Appearance: Normal appearance.   HENT:      Right Ear: Tympanic membrane normal.      Left Ear: Tympanic membrane normal.      Nose: Nose normal.       Mouth/Throat:      Mouth: Mucous membranes are moist.      Pharynx: Oropharynx is clear.   Eyes:      Conjunctiva/sclera: Conjunctivae normal.   Cardiovascular:      Rate and Rhythm: Normal rate and regular rhythm.      Heart sounds: Normal heart sounds.   Pulmonary:      Effort: Pulmonary effort is normal.      Breath sounds: Normal breath sounds.   Abdominal:      General: Abdomen is flat. Bowel sounds are normal.      Palpations: Abdomen is soft.      Tenderness: There is no abdominal tenderness.   Musculoskeletal:      Cervical back: Neck supple.      Right lower leg: No edema.      Left lower leg: No edema.   Skin:     General: Skin is warm and dry.      Findings: No rash.   Neurological:      Mental Status: He is alert.   Psychiatric:         Mood and Affect: Mood normal.           Signed Electronically by: EUSEBIO Deleon CNP

## 2024-07-11 ENCOUNTER — LAB (OUTPATIENT)
Dept: LAB | Facility: CLINIC | Age: 49
End: 2024-07-11
Payer: COMMERCIAL

## 2024-07-11 DIAGNOSIS — E03.9 HYPOTHYROIDISM, UNSPECIFIED TYPE: ICD-10-CM

## 2024-07-11 DIAGNOSIS — R03.0 ELEVATED BLOOD PRESSURE READING WITHOUT DIAGNOSIS OF HYPERTENSION: ICD-10-CM

## 2024-07-11 LAB
ALBUMIN SERPL BCG-MCNC: 4.5 G/DL (ref 3.5–5.2)
ALP SERPL-CCNC: 78 U/L (ref 40–150)
ALT SERPL W P-5'-P-CCNC: 45 U/L (ref 0–70)
ANION GAP SERPL CALCULATED.3IONS-SCNC: 10 MMOL/L (ref 7–15)
AST SERPL W P-5'-P-CCNC: 34 U/L (ref 0–45)
BILIRUB SERPL-MCNC: 0.7 MG/DL
BUN SERPL-MCNC: 15.6 MG/DL (ref 6–20)
CALCIUM SERPL-MCNC: 9.5 MG/DL (ref 8.6–10)
CHLORIDE SERPL-SCNC: 104 MMOL/L (ref 98–107)
CREAT SERPL-MCNC: 1.06 MG/DL (ref 0.67–1.17)
DEPRECATED HCO3 PLAS-SCNC: 25 MMOL/L (ref 22–29)
EGFRCR SERPLBLD CKD-EPI 2021: 87 ML/MIN/1.73M2
GLUCOSE SERPL-MCNC: 131 MG/DL (ref 70–99)
POTASSIUM SERPL-SCNC: 4.7 MMOL/L (ref 3.4–5.3)
PROT SERPL-MCNC: 7.2 G/DL (ref 6.4–8.3)
SODIUM SERPL-SCNC: 139 MMOL/L (ref 135–145)
TSH SERPL DL<=0.005 MIU/L-ACNC: 2.41 UIU/ML (ref 0.3–4.2)

## 2024-07-11 PROCEDURE — 84443 ASSAY THYROID STIM HORMONE: CPT

## 2024-07-11 PROCEDURE — 80053 COMPREHEN METABOLIC PANEL: CPT

## 2024-07-11 PROCEDURE — 36415 COLL VENOUS BLD VENIPUNCTURE: CPT

## 2024-07-20 DIAGNOSIS — R73.09 ELEVATED GLUCOSE: ICD-10-CM

## 2024-08-02 ENCOUNTER — LAB (OUTPATIENT)
Dept: LAB | Facility: CLINIC | Age: 49
End: 2024-08-02
Payer: COMMERCIAL

## 2024-08-02 DIAGNOSIS — R73.09 ELEVATED GLUCOSE: ICD-10-CM

## 2024-08-02 PROBLEM — R73.03 PREDIABETES: Status: ACTIVE | Noted: 2024-08-02

## 2024-08-02 LAB
FASTING STATUS PATIENT QL REPORTED: YES
GLUCOSE SERPL-MCNC: 96 MG/DL (ref 70–99)
HBA1C MFR BLD: 5.7 % (ref 0–5.6)

## 2024-08-02 PROCEDURE — 36415 COLL VENOUS BLD VENIPUNCTURE: CPT

## 2024-08-02 PROCEDURE — 83036 HEMOGLOBIN GLYCOSYLATED A1C: CPT

## 2024-08-02 PROCEDURE — 82947 ASSAY GLUCOSE BLOOD QUANT: CPT

## 2024-08-09 DIAGNOSIS — E03.1 CONGENITAL HYPOTHYROIDISM WITHOUT GOITER: ICD-10-CM

## 2024-08-09 RX ORDER — LEVOTHYROXINE SODIUM 125 UG/1
125 TABLET ORAL DAILY
Qty: 90 TABLET | Refills: 2 | Status: SHIPPED | OUTPATIENT
Start: 2024-08-09

## 2024-09-03 ENCOUNTER — TRANSFERRED RECORDS (OUTPATIENT)
Dept: HEALTH INFORMATION MANAGEMENT | Facility: CLINIC | Age: 49
End: 2024-09-03
Payer: COMMERCIAL

## 2024-09-12 ENCOUNTER — TRANSFERRED RECORDS (OUTPATIENT)
Dept: HEALTH INFORMATION MANAGEMENT | Facility: CLINIC | Age: 49
End: 2024-09-12
Payer: COMMERCIAL

## 2024-09-18 ENCOUNTER — VIRTUAL VISIT (OUTPATIENT)
Dept: URGENT CARE | Facility: CLINIC | Age: 49
End: 2024-09-18
Payer: COMMERCIAL

## 2024-09-18 DIAGNOSIS — L23.7 CONTACT DERMATITIS DUE TO POISON IVY: Primary | ICD-10-CM

## 2024-09-18 PROCEDURE — 99213 OFFICE O/P EST LOW 20 MIN: CPT | Mod: 95 | Performed by: EMERGENCY MEDICINE

## 2024-09-18 RX ORDER — METHYLPREDNISOLONE 4 MG
TABLET, DOSE PACK ORAL
Qty: 21 TABLET | Refills: 0 | Status: SHIPPED | OUTPATIENT
Start: 2024-09-18

## 2024-09-18 RX ORDER — BETAMETHASONE DIPROPIONATE 0.5 MG/G
CREAM TOPICAL 2 TIMES DAILY
Qty: 45 G | Refills: 2 | Status: SHIPPED | OUTPATIENT
Start: 2024-09-18

## 2024-09-18 RX ORDER — PREDNISONE 50 MG/1
50 TABLET ORAL DAILY
Qty: 3 TABLET | Refills: 0 | Status: SHIPPED | OUTPATIENT
Start: 2024-09-18 | End: 2024-09-21

## 2024-09-18 NOTE — PROGRESS NOTES
Phone appointment:  Duration: 5 minutes  Patient location: At home  Provider location: [facility] virtual provider (remote).      CHIEF COMPLAINT: Poison ivy.      HPI: Patient is a 49-year-old male whose had a history of recurrent poison ivy developed similar rash like lesions on his lower legs about 3 weeks ago.  It is creeped up from the ankles up to the legs and back of the knees.  He has nowhere else on his body including no face or eyes.      ROS: See HPI otherwise normal.    Allergies   Allergen Reactions    No Known Drug Allergy       Current Outpatient Medications   Medication Sig Dispense Refill    betamethasone dipropionate (DIPROSONE) 0.05 % external cream Apply topically 2 times daily. 45 g 2    methylPREDNISolone (MEDROL DOSEPAK) 4 MG tablet therapy pack Follow Package Directions 21 tablet 0    predniSONE (DELTASONE) 50 MG tablet Take 1 tablet (50 mg) by mouth daily for 3 days. 3 tablet 0    levothyroxine (SYNTHROID/LEVOTHROID) 125 MCG tablet Take 1 tablet (125 mcg) by mouth daily 90 tablet 2    magnesium oxide (MAG-OX) 400 MG tablet Take 1 tablet (400 mg) by mouth daily      TURMERIC PO       UNABLE TO FIND MEDICATION NAME: Calcium, magnesium, and zinc           PE: No acute distress on phone appointment.  Alert and oriented.  Nondyspneic sounding speaking full sentences.  No further exam due to limitations of phone only appointment.        TREATMENT: None.      ASSESSMENT: Contact dermatitis related to poison ivy by history.    DIAGNOSIS: Contact dermatitis.      PLAN: Prednisone 50 mg x 3 days followed by Medrol Dosepak.  Topical betamethasone.  Follow-up 1 week if symptoms do not improve, sooner if seemingly worse.

## 2024-09-26 ENCOUNTER — TRANSFERRED RECORDS (OUTPATIENT)
Dept: HEALTH INFORMATION MANAGEMENT | Facility: CLINIC | Age: 49
End: 2024-09-26
Payer: COMMERCIAL

## 2024-11-11 ENCOUNTER — MYC REFILL (OUTPATIENT)
Dept: FAMILY MEDICINE | Facility: CLINIC | Age: 49
End: 2024-11-11
Payer: COMMERCIAL

## 2024-11-11 DIAGNOSIS — E03.1 CONGENITAL HYPOTHYROIDISM WITHOUT GOITER: ICD-10-CM

## 2024-11-11 RX ORDER — LEVOTHYROXINE SODIUM 125 UG/1
125 TABLET ORAL DAILY
Qty: 90 TABLET | Refills: 2 | OUTPATIENT
Start: 2024-11-11

## 2025-04-16 ENCOUNTER — PATIENT OUTREACH (OUTPATIENT)
Dept: CARE COORDINATION | Facility: CLINIC | Age: 50
End: 2025-04-16
Payer: COMMERCIAL

## 2025-04-16 ENCOUNTER — TELEPHONE (OUTPATIENT)
Dept: FAMILY MEDICINE | Facility: CLINIC | Age: 50
End: 2025-04-16
Payer: COMMERCIAL

## 2025-04-16 NOTE — TELEPHONE ENCOUNTER
LORIM to call back for an appointment. Two more attempts will be made.     Brooklyn Joiner    [facility] Meghan Villegas

## 2025-04-16 NOTE — TELEPHONE ENCOUNTER
Reason for Call:  Appointment Request    Patient requesting this type of appt:  Annual Physical - Med check for refill     Requested provider: Flora Solano    Reason patient unable to be scheduled: Not within requested timeframe    When does patient want to be seen/preferred time: 05/16/25 or after    Comments: Patient needs to get an annual physical on or after 05/16/2025 because he needs his medication refilled but it won't last another 30 days and wants to know if PCP could refill it before his annual physical.     Could we send this information to you in [employer] or would you prefer to receive a phone call?:   Patient would prefer a phone call   Okay to leave a detailed message?: Yes at Cell number on file:    Telephone Information:   Mobile [phone]       Call taken on 4/16/2025 at 12:15 PM by Fito Nunes

## 2025-04-30 DIAGNOSIS — E03.1 CONGENITAL HYPOTHYROIDISM WITHOUT GOITER: ICD-10-CM

## 2025-04-30 RX ORDER — LEVOTHYROXINE SODIUM 125 UG/1
125 TABLET ORAL DAILY
Qty: 90 TABLET | Refills: 0 | Status: SHIPPED | OUTPATIENT
Start: 2025-04-30

## 2025-05-19 ENCOUNTER — OFFICE VISIT (OUTPATIENT)
Dept: FAMILY MEDICINE | Facility: CLINIC | Age: 50
End: 2025-05-19
Payer: COMMERCIAL

## 2025-05-19 VITALS
OXYGEN SATURATION: 98 % | WEIGHT: 263.9 LBS | SYSTOLIC BLOOD PRESSURE: 155 MMHG | TEMPERATURE: 98.2 F | BODY MASS INDEX: 35.74 KG/M2 | HEIGHT: 72 IN | HEART RATE: 71 BPM | DIASTOLIC BLOOD PRESSURE: 91 MMHG | RESPIRATION RATE: 20 BRPM

## 2025-05-19 DIAGNOSIS — R03.0 ELEVATED BP WITHOUT DIAGNOSIS OF HYPERTENSION: ICD-10-CM

## 2025-05-19 DIAGNOSIS — E66.812 CLASS 2 SEVERE OBESITY DUE TO EXCESS CALORIES WITH SERIOUS COMORBIDITY AND BODY MASS INDEX (BMI) OF 35.0 TO 35.9 IN ADULT (H): ICD-10-CM

## 2025-05-19 DIAGNOSIS — E66.01 CLASS 2 SEVERE OBESITY DUE TO EXCESS CALORIES WITH SERIOUS COMORBIDITY AND BODY MASS INDEX (BMI) OF 35.0 TO 35.9 IN ADULT (H): ICD-10-CM

## 2025-05-19 DIAGNOSIS — R73.03 PREDIABETES: ICD-10-CM

## 2025-05-19 DIAGNOSIS — Z00.00 ROUTINE GENERAL MEDICAL EXAMINATION AT A HEALTH CARE FACILITY: Primary | ICD-10-CM

## 2025-05-19 DIAGNOSIS — E03.9 HYPOTHYROIDISM, UNSPECIFIED TYPE: ICD-10-CM

## 2025-05-19 DIAGNOSIS — Z13.220 LIPID SCREENING: ICD-10-CM

## 2025-05-19 LAB
EST. AVERAGE GLUCOSE BLD GHB EST-MCNC: 120 MG/DL
HBA1C MFR BLD: 5.8 % (ref 0–5.6)

## 2025-05-19 PROCEDURE — 36415 COLL VENOUS BLD VENIPUNCTURE: CPT | Performed by: NURSE PRACTITIONER

## 2025-05-19 PROCEDURE — 83036 HEMOGLOBIN GLYCOSYLATED A1C: CPT | Performed by: NURSE PRACTITIONER

## 2025-05-19 PROCEDURE — G2211 COMPLEX E/M VISIT ADD ON: HCPCS | Performed by: NURSE PRACTITIONER

## 2025-05-19 PROCEDURE — 84443 ASSAY THYROID STIM HORMONE: CPT | Performed by: NURSE PRACTITIONER

## 2025-05-19 PROCEDURE — 80061 LIPID PANEL: CPT | Performed by: NURSE PRACTITIONER

## 2025-05-19 PROCEDURE — 99214 OFFICE O/P EST MOD 30 MIN: CPT | Mod: 25 | Performed by: NURSE PRACTITIONER

## 2025-05-19 PROCEDURE — 80053 COMPREHEN METABOLIC PANEL: CPT | Performed by: NURSE PRACTITIONER

## 2025-05-19 PROCEDURE — 99396 PREV VISIT EST AGE 40-64: CPT | Performed by: NURSE PRACTITIONER

## 2025-05-19 SDOH — HEALTH STABILITY: PHYSICAL HEALTH: ON AVERAGE, HOW MANY DAYS PER WEEK DO YOU ENGAGE IN MODERATE TO STRENUOUS EXERCISE (LIKE A BRISK WALK)?: 6 DAYS

## 2025-05-19 ASSESSMENT — PAIN SCALES - GENERAL: PAINLEVEL_OUTOF10: NO PAIN (0)

## 2025-05-19 ASSESSMENT — SOCIAL DETERMINANTS OF HEALTH (SDOH): HOW OFTEN DO YOU GET TOGETHER WITH FRIENDS OR RELATIVES?: THREE TIMES A WEEK

## 2025-05-19 NOTE — PROGRESS NOTES
Assessment and Plan  Routine general medical examination at a health care facility    Prediabetes   Labs as noted.  Add semaglutide for weight management   - Comprehensive metabolic panel (BMP + Alb, Alk Phos, ALT, AST, Total. Bili, TP)  - Hemoglobin A1c  - semaglutide-weight management (WEGOVY) 0.25 MG/0.5ML pen  Dispense: 2 mL; Refill: 0  - Semaglutide-Weight Management (WEGOVY) 0.5 MG/0.5ML pen  Dispense: 2 mL; Refill: 0  - Comprehensive metabolic panel (BMP + Alb, Alk Phos, ALT, AST, Total. Bili, TP)  - Hemoglobin A1c    Hypothyroidism, unspecified type   Routine monitoring  - TSH with free T4 reflex  - TSH with free T4 reflex    Lipid screening  - Lipid Profile (Chol, Trig, HDL, LDL calc)  - Lipid Profile (Chol, Trig, HDL, LDL calc)    Class 2 severe obesity due to excess calories with serious comorbidity and body mass index (BMI) of 35.0 to 35.9 in adult (H)   Labs as noted and add semaglutide as desired by patient.  Multiple risk factors including prediabetes, elevated BP  Discussed BP elevation with pt today and he wishes to trial diet and exercise before adding medication.    - Comprehensive metabolic panel (BMP + Alb, Alk Phos, ALT, AST, Total. Bili, TP)  - Hemoglobin A1c  - Lipid Profile (Chol, Trig, HDL, LDL calc)  - semaglutide-weight management (WEGOVY) 0.25 MG/0.5ML pen  Dispense: 2 mL; Refill: 0  - Semaglutide-Weight Management (WEGOVY) 0.5 MG/0.5ML pen  Dispense: 2 mL; Refill: 0  - Comprehensive metabolic panel (BMP + Alb, Alk Phos, ALT, AST, Total. Bili, TP)  - Hemoglobin A1c  - Lipid Profile (Chol, Trig, HDL, LDL calc)      Counseling  Appropriate preventive services were addressed with this patient via screening, questionnaire, or discussion as appropriate for fall prevention, nutrition, physical activity, Tobacco-use cessation, social engagement, weight loss and cognition.  Checklist reviewing preventive services available has been given to the patient.  Reviewed patient's diet, addressing  concerns and/or questions.      The longitudinal plan of care for the diagnosis(es)/condition(s) as documented were addressed during this visit. Due to the added complexity in care, I will continue to support Kodak in the subsequent management and with ongoing continuity of care.    Follow-up    Follow-up Visit   Expected date:  Jul 19, 2025 (Approximate)      Follow Up Appointment Details:     Follow-up with whom?: Me    Follow-Up for what?: Chronic Disease f/u    Chronic Disease f/u: General (Other)    How?: In Person    Is this an as-needed follow-up?: No             Follow-up Visit   Expected date:  May 19, 2026 (Approximate)      Follow Up Appointment Details:     Follow-up with whom?: PCP    Follow-Up for what?: Adult Preventive    How?: In Person                 EUSEBIO Deleon [facility] [place]    ============================================  Subjective   Kodak BERTHA Crowe is a 50 year old male   here for a Physical Exam    Prediabetes  History of prediabetes.  No current symptoms of diabetes at this time.  Recheck labs today.    Hypothyroid  TSH   Date Value Ref Range Status   05/19/2025 2.14 0.30 - 4.20 uIU/mL Final   10/18/2022 1.44 0.40 - 4.00 mU/L Final   10/14/2020 1.32 0.40 - 4.00 mU/L Final     No symptoms of hypo or hyperthyroidism: no decreased or increased weight, no feeling cold/chilly or excessively warm, no diarrhea or constipation, no undue sweatiness, anxiety or palpitations.    Taking medications routinely.  Is on Thyroid Medications: Levothyroxine 125 mg tabs  Plan  Tsh--today and refill meds for the year     Class 2 severe obesity due to excess calories with serious comorbidity and body mass index (BMI) of 35.0 to 35.9 in adult (H)  Obesity with BMI 35.79 and has prediabetes.  Is interested in using wegovy as he is concerned with prediabetes, and with elevated blood pressure as well.  Wife is on wegovy and he would like to try this medication.  Wt Readings from Last 4  Encounters:   05/19/25 119.7 kg (263 lb 14.4 oz)   05/16/24 119.3 kg (263 lb)   08/07/23 124.2 kg (273 lb 14.4 oz)   10/19/22 121.1 kg (267 lb)     Is interested in the use of GLP-1 agents for weight loss.  Has tried diet and exercise without success, and continues to gain weight.  Risks and benefits of the medications were reviewed including common side effects such as GERD, nausea, vomiting, constipation, and palpitations.  The need for chronic management of weight was reviewed.  Patient has no contraindications to these medications such as multiple endocrine neoplasia history in self or family no thyroid cancer history, no history of pancreatitis.        .    Health Care Directive  Discussed advance care planning with patient; however, patient declined at this time.  Patient Care Team:  Flora Solano APRN CNP as PCP - General  Flora Solano APRN CNP as Assigned PCP        5/10/2024   General Health   How would you rate your overall physical health? Good   Feel stress (tense, anxious, or unable to sleep) To some extent         5/10/2024   Nutrition   Three or more servings of calcium each day? Yes   Diet: Regular (no restrictions)   How many servings of fruit and vegetables per day? (!) 0-1   How many sweetened beverages each day? 0-1         5/10/2024   Exercise   Days per week of moderate/strenous exercise 6 days   Average minutes spent exercising at this level 90 min           5/10/2024   Social Factors   Frequency of gathering with friends or relatives More than three times a week   Worry food won't last until get money to buy more No   Food not last or not have enough money for food? No   Do you have housing? (Housing is defined as stable permanent housing and does not include staying outside in a car, in a tent, in an abandoned building, in an overnight shelter, or couch-surfing.) Yes   Are you worried about losing your housing? No   Lack of transportation? No   Unable to get utilities  (heat,electricity)? No         5/10/2024   Dental   Dentist two times every year? Yes           Today's PHQ-2 Score:       5/19/2025     4:39 PM   PHQ-2 ( 1999 [employer])   Q1: Little interest or pleasure in doing things 0   Q2: Feeling down, depressed or hopeless 0   PHQ-2 Score 0    Q1: Little interest or pleasure in doing things Not at all   Q2: Feeling down, depressed or hopeless Not at all   PHQ-2 Score 0       Patient-reported           5/10/2024   Substance Use   Alcohol more than 3/day or more than 7/wk No   Do you use any other substances recreationally? No       Social History     Tobacco Use    Smoking status: Never     Passive exposure: Never    Smokeless tobacco: Never   Vaping Use    Vaping status: Never Used   Substance Use Topics    Alcohol use: Yes     Comment: 1 drink a night on weekends    Drug use: No       ASCVD Risk   The 10-year ASCVD risk score (Jodee MARTÍNEZ, et al., 2019) is: 7.2%    Values used to calculate the score:      Age: 50 years      Sex: Male      Is Non- : No      Diabetic: No      Tobacco smoker: No      Systolic Blood Pressure: 155 mmHg      Is BP treated: No      HDL Cholesterol: 40 mg/dL      Total Cholesterol: 240 mg/dL       The Following Health Maintenance Topics are reviewed and are correct as of today:  Health Maintenance   Topic Date Due    ZOSTER IMMUNIZATION (1 of 2) 04/11/2026    YEARLY PREVENTIVE VISIT  05/19/2026    TSH W/FREE T4 REFLEX  05/19/2026    ANNUAL REVIEW OF HM ORDERS  05/19/2026    DIABETES SCREENING  05/19/2028    LIPID  05/19/2030    ADVANCE CARE PLANNING  05/19/2030    DTAP/TDAP/TD IMMUNIZATION (4 - Td or Tdap) 10/14/2030    COLORECTAL CANCER SCREENING  02/06/2033    HEPATITIS C SCREENING  Completed    PHQ-2 (once per calendar year)  Completed    INFLUENZA VACCINE  Completed    Pneumococcal Vaccine: Pediatrics (0 to 5 Years) and At-Risk Patients (6 to 49 Years)  Aged Out    HPV IMMUNIZATION  Aged Out    MENINGITIS  IMMUNIZATION  Aged Out    HIV SCREENING  Discontinued    HEPATITIS B IMMUNIZATION  Discontinued    COVID-19 Vaccine  Discontinued     Reviewed by Provider at this visit   Tobacco  Allergies  Meds  Problems  Med Hx  Surg Hx  Fam Hx         HPI  Review of Systems   Constitutional:  Negative for chills, fever and unexpected weight change.   HENT: Negative.     Respiratory:  Negative for chest tightness and shortness of breath.    Cardiovascular:  Negative for chest pain and palpitations.   Gastrointestinal:  Negative for abdominal pain, constipation and diarrhea.   Genitourinary:  Negative for dysuria.   Skin:  Negative for rash.     ============================================  Objective    Exam  BP (!) 155/91 (BP Location: Right arm, Patient Position: Sitting, Cuff Size: Adult Large)   Pulse 71   Temp 98.2  F (36.8  C) (Oral)   Resp 20   Ht 1.829 m (6')   Wt 119.7 kg (263 lb 14.4 oz)   SpO2 98%   BMI 35.79 kg/m    Physical Exam  Constitutional:       Appearance: Normal appearance.   HENT:      Right Ear: Tympanic membrane normal.      Left Ear: Tympanic membrane normal.      Nose: Nose normal.      Mouth/Throat:      Mouth: Mucous membranes are moist.      Pharynx: Oropharynx is clear.   Eyes:      Conjunctiva/sclera: Conjunctivae normal.   Cardiovascular:      Rate and Rhythm: Normal rate and regular rhythm.      Heart sounds: Normal heart sounds.   Pulmonary:      Effort: Pulmonary effort is normal.      Breath sounds: Normal breath sounds.   Abdominal:      General: Abdomen is flat. Bowel sounds are normal.      Palpations: Abdomen is soft.      Tenderness: There is no abdominal tenderness.   Musculoskeletal:      Cervical back: Neck supple.      Right lower leg: No edema.      Left lower leg: No edema.   Skin:     General: Skin is warm and dry.      Findings: No rash.   Neurological:      Mental Status: He is alert.   Psychiatric:         Mood and Affect: Mood normal.

## 2025-05-19 NOTE — PATIENT INSTRUCTIONS
Miralax for constipation  Watch for increased heartburn      Patient Education   Preventive Care Advice   This is general advice given by our system to help you stay healthy. However, your care team may have specific advice just for you. Please talk to your care team about your preventive care needs.  Nutrition  Eat 5 or more servings of fruits and vegetables each day.  Try wheat bread, brown rice and whole grain pasta (instead of white bread, rice, and pasta).  Get enough calcium and vitamin D. Check the label on foods and aim for 100% of the RDA (recommended daily allowance).  Lifestyle  Exercise at least 150 minutes each week  (30 minutes a day, 5 days a week).  Do muscle strengthening activities 2 days a week. These help control your weight and prevent disease.  No smoking.  Wear sunscreen to prevent skin cancer.  Have a dental exam and cleaning every 6 months.  Yearly exams  See your health care team every year to talk about:  Any changes in your health.  Any medicines your care team has prescribed.  Preventive care, family planning, and ways to prevent chronic diseases.  Shots (vaccines)   HPV shots (up to age 26), if you've never had them before.  Hepatitis B shots (up to age 59), if you've never had them before.  COVID-19 shot: Get this shot when it's due.  Flu shot: Get a flu shot every year.  Tetanus shot: Get a tetanus shot every 10 years.  Pneumococcal, hepatitis A, and RSV shots: Ask your care team if you need these based on your risk.  Shingles shot (for age 50 and up)  General health tests  Diabetes screening:  Starting at age 35, Get screened for diabetes at least every 3 years.  If you are younger than age 35, ask your care team if you should be screened for diabetes.  Cholesterol test: At age 39, start having a cholesterol test every 5 years, or more often if advised.  Bone density scan (DEXA): At age 50, ask your care team if you should have this scan for osteoporosis (brittle bones).  Hepatitis  C: Get tested at least once in your life.  STIs (sexually transmitted infections)  Before age 24: Ask your care team if you should be screened for STIs.  After age 24: Get screened for STIs if you're at risk. You are at risk for STIs (including HIV) if:  You are sexually active with more than one person.  You don't use condoms every time.  You or a partner was diagnosed with a sexually transmitted infection.  If you are at risk for HIV, ask about PrEP medicine to prevent HIV.  Get tested for HIV at least once in your life, whether you are at risk for HIV or not.  Cancer screening tests  Cervical cancer screening: If you have a cervix, begin getting regular cervical cancer screening tests starting at age 21.  Breast cancer scan (mammogram): If you've ever had breasts, begin having regular mammograms starting at age 40. This is a scan to check for breast cancer.  Colon cancer screening: It is important to start screening for colon cancer at age 45.  Have a colonoscopy test every 10 years (or more often if you're at risk) Or, ask your provider about stool tests like a FIT test every year or Cologuard test every 3 years.  To learn more about your testing options, visit:   .  For help making a decision, visit:   https://bit.ly/uh79674.  Prostate cancer screening test: If you have a prostate, ask your care team if a prostate cancer screening test (PSA) at age 55 is right for you.  Lung cancer screening: If you are a current or former smoker ages 50 to 80, ask your care team if ongoing lung cancer screenings are right for you.  For informational purposes only. Not to replace the advice of your health care provider. Copyright   2023 [employer]. All rights reserved. Clinically reviewed by the [facility] Transitions Program. Contact At Once! 199240 - REV 01/24.

## 2025-05-20 ENCOUNTER — RESULTS FOLLOW-UP (OUTPATIENT)
Dept: FAMILY MEDICINE | Facility: CLINIC | Age: 50
End: 2025-05-20

## 2025-05-20 LAB
ALBUMIN SERPL BCG-MCNC: 4.7 G/DL (ref 3.5–5.2)
ALP SERPL-CCNC: 89 U/L (ref 40–150)
ALT SERPL W P-5'-P-CCNC: 48 U/L (ref 0–70)
ANION GAP SERPL CALCULATED.3IONS-SCNC: 14 MMOL/L (ref 7–15)
AST SERPL W P-5'-P-CCNC: 44 U/L (ref 0–45)
BILIRUB SERPL-MCNC: 0.5 MG/DL
BUN SERPL-MCNC: 21.5 MG/DL (ref 6–20)
CALCIUM SERPL-MCNC: 9.6 MG/DL (ref 8.8–10.4)
CHLORIDE SERPL-SCNC: 100 MMOL/L (ref 98–107)
CHOLEST SERPL-MCNC: 240 MG/DL
CREAT SERPL-MCNC: 1.01 MG/DL (ref 0.67–1.17)
EGFRCR SERPLBLD CKD-EPI 2021: >90 ML/MIN/1.73M2
FASTING STATUS PATIENT QL REPORTED: NO
FASTING STATUS PATIENT QL REPORTED: NO
GLUCOSE SERPL-MCNC: 98 MG/DL (ref 70–99)
HCO3 SERPL-SCNC: 23 MMOL/L (ref 22–29)
HDLC SERPL-MCNC: 40 MG/DL
LDLC SERPL CALC-MCNC: 122 MG/DL
NONHDLC SERPL-MCNC: 200 MG/DL
POTASSIUM SERPL-SCNC: 4.7 MMOL/L (ref 3.4–5.3)
PROT SERPL-MCNC: 7.4 G/DL (ref 6.4–8.3)
SODIUM SERPL-SCNC: 137 MMOL/L (ref 135–145)
TRIGL SERPL-MCNC: 388 MG/DL
TSH SERPL DL<=0.005 MIU/L-ACNC: 2.14 UIU/ML (ref 0.3–4.2)

## 2025-05-21 PROBLEM — R03.0 ELEVATED BP WITHOUT DIAGNOSIS OF HYPERTENSION: Status: ACTIVE | Noted: 2025-05-21

## 2025-05-21 PROBLEM — E66.01 CLASS 2 SEVERE OBESITY DUE TO EXCESS CALORIES WITH SERIOUS COMORBIDITY AND BODY MASS INDEX (BMI) OF 35.0 TO 35.9 IN ADULT (H): Status: ACTIVE | Noted: 2025-05-21

## 2025-05-21 PROBLEM — E66.812 CLASS 2 SEVERE OBESITY DUE TO EXCESS CALORIES WITH SERIOUS COMORBIDITY AND BODY MASS INDEX (BMI) OF 35.0 TO 35.9 IN ADULT (H): Status: ACTIVE | Noted: 2025-05-21

## 2025-05-21 ASSESSMENT — ENCOUNTER SYMPTOMS
CONSTIPATION: 0
DYSURIA: 0
FEVER: 0
CHILLS: 0
PALPITATIONS: 0
UNEXPECTED WEIGHT CHANGE: 0
CHEST TIGHTNESS: 0
ABDOMINAL PAIN: 0
SHORTNESS OF BREATH: 0
DIARRHEA: 0

## 2025-05-21 NOTE — ASSESSMENT & PLAN NOTE
Obesity with BMI 35.79 and has prediabetes.  Is interested in using wegovy as he is concerned with prediabetes, and with elevated blood pressure as well.  Wife is on wegovy and he would like to try this medication.  Wt Readings from Last 4 Encounters:   05/19/25 119.7 kg (263 lb 14.4 oz)   05/16/24 119.3 kg (263 lb)   08/07/23 124.2 kg (273 lb 14.4 oz)   10/19/22 121.1 kg (267 lb)     Is interested in the use of GLP-1 agents for weight loss.  Has tried diet and exercise without success, and continues to gain weight.  Risks and benefits of the medications were reviewed including common side effects such as GERD, nausea, vomiting, constipation, and palpitations.  The need for chronic management of weight was reviewed.  Patient has no contraindications to these medications such as multiple endocrine neoplasia history in self or family no thyroid cancer history, no history of pancreatitis.        .

## 2025-05-21 NOTE — ASSESSMENT & PLAN NOTE
TSH   Date Value Ref Range Status   05/19/2025 2.14 0.30 - 4.20 uIU/mL Final   10/18/2022 1.44 0.40 - 4.00 mU/L Final   10/14/2020 1.32 0.40 - 4.00 mU/L Final     No symptoms of hypo or hyperthyroidism: no decreased or increased weight, no feeling cold/chilly or excessively warm, no diarrhea or constipation, no undue sweatiness, anxiety or palpitations.    Taking medications routinely.  Is on Thyroid Medications: Levothyroxine 125 mg tabs  Plan  Tsh--today and refill meds for the year

## 2025-05-21 NOTE — TELEPHONE ENCOUNTER
"Please clarify what nursing can assist with. Wanting pt to get Wegovy or trial diet and exercise? Looks like PA has already been initiated.     Per note on 5/21 11:53AM:  \"Is interested in the use of GLP-1 agents for weight loss. Has tried diet and exercise without success, and continues to gain weight. Risks and benefits of the medications were reviewed including common side effects such as GERD, nausea, vomiting, constipation, and palpitations. The need for chronic management of weight was reviewed. Patient has no contraindications to these medications such as multiple endocrine neoplasia history in self or family no thyroid cancer history, no history of pancreatitis\"    Per note on 5/21 12pm:   \"Trial diet and exercise. If not improved would recommend medication management. \"    Nirmala Savage RN   [facility]        "

## 2025-05-29 NOTE — TELEPHONE ENCOUNTER
[employer] message sent to patient to see if he has picked up Wegovy.    Freda Casey RN   [facility]

## 2025-05-29 NOTE — TELEPHONE ENCOUNTER
Thanks-- not sure if there is a question or not here for this patient.  So please check in with him and see if he was able to get the wegovy that was ordered for him.  Thanks.  I can address this if it is still not clear when I come back on June 2nd.

## 2025-07-07 ENCOUNTER — MYC MEDICAL ADVICE (OUTPATIENT)
Dept: FAMILY MEDICINE | Facility: CLINIC | Age: 50
End: 2025-07-07
Payer: COMMERCIAL

## 2025-07-07 DIAGNOSIS — R73.03 PREDIABETES: ICD-10-CM

## 2025-07-07 DIAGNOSIS — E66.01 CLASS 2 SEVERE OBESITY DUE TO EXCESS CALORIES WITH SERIOUS COMORBIDITY AND BODY MASS INDEX (BMI) OF 35.0 TO 35.9 IN ADULT (H): Primary | ICD-10-CM

## 2025-07-07 DIAGNOSIS — E66.812 CLASS 2 SEVERE OBESITY DUE TO EXCESS CALORIES WITH SERIOUS COMORBIDITY AND BODY MASS INDEX (BMI) OF 35.0 TO 35.9 IN ADULT (H): Primary | ICD-10-CM

## 2025-07-07 NOTE — TELEPHONE ENCOUNTER
Please see Mychart. Patient is currently on Wegovy 0.5mg dose. Denies any side effects. Patient requesting a refill/dose increase.

## 2025-07-30 ENCOUNTER — OFFICE VISIT (OUTPATIENT)
Dept: FAMILY MEDICINE | Facility: CLINIC | Age: 50
End: 2025-07-30
Payer: COMMERCIAL

## 2025-07-30 VITALS
SYSTOLIC BLOOD PRESSURE: 133 MMHG | HEART RATE: 65 BPM | WEIGHT: 248 LBS | RESPIRATION RATE: 15 BRPM | DIASTOLIC BLOOD PRESSURE: 89 MMHG | BODY MASS INDEX: 33.59 KG/M2 | TEMPERATURE: 98.2 F | HEIGHT: 72 IN | OXYGEN SATURATION: 98 %

## 2025-07-30 DIAGNOSIS — E66.812 CLASS 2 SEVERE OBESITY DUE TO EXCESS CALORIES WITH SERIOUS COMORBIDITY AND BODY MASS INDEX (BMI) OF 35.0 TO 35.9 IN ADULT (H): ICD-10-CM

## 2025-07-30 DIAGNOSIS — R73.03 PREDIABETES: ICD-10-CM

## 2025-07-30 DIAGNOSIS — E66.01 CLASS 2 SEVERE OBESITY DUE TO EXCESS CALORIES WITH SERIOUS COMORBIDITY AND BODY MASS INDEX (BMI) OF 35.0 TO 35.9 IN ADULT (H): ICD-10-CM

## 2025-07-30 DIAGNOSIS — E78.2 MIXED HYPERLIPIDEMIA: ICD-10-CM

## 2025-07-30 DIAGNOSIS — R25.2 CRAMP OF LIMB: Primary | ICD-10-CM

## 2025-07-30 LAB
ANION GAP SERPL CALCULATED.3IONS-SCNC: 8 MMOL/L (ref 7–15)
BUN SERPL-MCNC: 14.8 MG/DL (ref 6–20)
CALCIUM SERPL-MCNC: 10 MG/DL (ref 8.8–10.4)
CHLORIDE SERPL-SCNC: 103 MMOL/L (ref 98–107)
CHOLEST SERPL-MCNC: 203 MG/DL
CREAT SERPL-MCNC: 1.11 MG/DL (ref 0.67–1.17)
EGFRCR SERPLBLD CKD-EPI 2021: 81 ML/MIN/1.73M2
FASTING STATUS PATIENT QL REPORTED: YES
FASTING STATUS PATIENT QL REPORTED: YES
GLUCOSE SERPL-MCNC: 98 MG/DL (ref 70–99)
HCO3 SERPL-SCNC: 27 MMOL/L (ref 22–29)
HDLC SERPL-MCNC: 47 MG/DL
LDLC SERPL CALC-MCNC: 130 MG/DL
NONHDLC SERPL-MCNC: 156 MG/DL
POTASSIUM SERPL-SCNC: 5.1 MMOL/L (ref 3.4–5.3)
SODIUM SERPL-SCNC: 138 MMOL/L (ref 135–145)
TRIGL SERPL-MCNC: 128 MG/DL

## 2025-07-30 PROCEDURE — 80061 LIPID PANEL: CPT | Performed by: NURSE PRACTITIONER

## 2025-07-30 PROCEDURE — 36415 COLL VENOUS BLD VENIPUNCTURE: CPT | Performed by: NURSE PRACTITIONER

## 2025-07-30 PROCEDURE — 80048 BASIC METABOLIC PNL TOTAL CA: CPT | Performed by: NURSE PRACTITIONER

## 2025-07-30 ASSESSMENT — ENCOUNTER SYMPTOMS
NAUSEA: 0
CONSTIPATION: 0
CONSTITUTIONAL NEGATIVE: 1
RESPIRATORY NEGATIVE: 1
HEARTBURN: 0
CARDIOVASCULAR NEGATIVE: 1

## 2025-07-30 ASSESSMENT — PAIN SCALES - GENERAL: PAINLEVEL_OUTOF10: NO PAIN (0)

## 2025-07-30 NOTE — ASSESSMENT & PLAN NOTE
Wt Readings from Last 4 Encounters:   07/30/25 112.5 kg (248 lb)   05/19/25 119.7 kg (263 lb 14.4 oz)   05/16/24 119.3 kg (263 lb)   08/07/23 124.2 kg (273 lb 14.4 oz)       Tolerating semaglutide at 1 mg well.

## 2025-07-30 NOTE — PROGRESS NOTES
Assessment & Plan   There are no diagnoses linked to this encounter.  {Follow-up (Optional):635475}    EUSEBIO Deleon CNP  [facility] [place]  ============================================  Subjective  No problem-specific Assessment & Plan notes found for this encounter.    Question 7/30/2025  8:32 AM CDT - Filed by Patient   I became overweight: As a Child   My lowest weight (in lbs) since age 19 was: 195   My highest weight (in lbs) since age 19 was: 270   The following factors have contributed to my weight gain Eating Wrong Types of Foods / Eating too Much    Genetic (Runs in the Family)   I have tried the following methods to lose weight: Watching Portions or Calories    Exercise    Low Carb / High Protein Diet   I have the following symptoms associated with my weight: None of the Above   With regards to my diet, I am struggling with the following Other   Have you had any concerns about or been diagnosed with an eating disorder in the past? No   My current level of physical activity: Heavy Exercise (6-7 days/week) or Athlete (2x per day)   With regards to my activity/exercise, I am struggling with Other         History of Present Illness       He eats 0-1 servings of fruits and vegetables daily.He consumes 1 sweetened beverage(s) daily.He exercises with enough effort to increase his heart rate 60 or more minutes per day.  He exercises with enough effort to increase his heart rate 6 days per week.   He is taking medications regularly.    Reviewed by Provider at this visit                 Review of Systems   Constitutional: Negative.    Respiratory: Negative.     Cardiovascular: Negative.    Gastrointestinal:  Negative for constipation, heartburn and nausea.     ============================================  Objective    /89 (BP Location: Right arm, Patient Position: Sitting, Cuff Size: Adult Large)   Pulse 65   Temp 98.2  F (36.8  C) (Oral)   Resp 15   Ht 1.829 m (6')   Wt 112.5 kg  (248 lb)   SpO2 98%   BMI 33.63 kg/m    Physical Exam  Constitutional:       Appearance: Normal appearance.   Cardiovascular:      Rate and Rhythm: Normal rate.   Pulmonary:      Effort: Pulmonary effort is normal.   Neurological:      Mental Status: He is alert.   Psychiatric:         Mood and Affect: Mood normal.        ============================================  EUSEBIO Deleon CNP  Signed Electronically by: EUSEBIO Deleon CNP

## 2025-07-30 NOTE — PATIENT INSTRUCTIONS
"Lets have you stay at 1.0 mg semaglutide (wegovy) for the next three months and see you back in clinic.  If you have any trouble between now and then let us know.  Fasting lipid panel in 2-3 months  Try to continue electrolyte supplements and practice adequate hydration.  Consider tonic water occasionally as well.    Following the MyPlate Food Guide: Care Instructions  MyPlate is a food guide from the [place] Department of Agriculture. You can use it to know how much fruit, vegetables, grains, milk products, and protein foods to eat every day. These amounts are for adults who need 1,600 to 3,200 calories a day.    Eat 1  to 2  cups of fresh, frozen, dried, juiced, or canned fruit.   These count as 1 cup of fruit:  1 cup of fresh fruit, frozen fruit, or 100% fruit juice  1/2 cup of dried fruit  1 large banana, large orange, or small apple     Eat 2 to 4 cups of vegetables. Try to include dark green, red, and orange vegetables.   These count as 1 cup of vegetables:  2 cups of raw leafy greens  1 cup of raw or cooked vegetables  1 cup of vegetable juice     Eat 5 to 10 oz (ounces) of grains, such as bread, cereal, rice, tortillas, or noodles. Choose whole-grain products for at least half of your grain servings.   These count as 1 oz of grains:  1 slice of bread  1 tortilla (6-inch)  1/2 cup cooked rice or noodles     Eat or drink 3 cups of milk products, such as nonfat or low-fat milk, soy milk, cheese, or yogurt.   These count as 1 cup of milk products:  1 cup of milk, soy milk, or yogurt  1/3 cup of shredded cheese  1  oz of hard cheese, like cheddar     Eat 5 to 7 oz of protein foods. These include lean meats, poultry, fish, soy products, eggs, nuts, seeds, beans, and lentils.   These count as 1 oz of protein foods:  1 oz of meat, poultry, or fish  1 egg  1/4 cup of cooked beans or tofu   Where can you learn more?  Go to https://www.healthwise.net/patiented  Enter Z059 in the search box to learn more about \"Following " "the MyPlate Food Guide: Care Instructions.\"  Current as of: October 7, 2024  Content Version: 14.5    5075-7786 [employer].   Care instructions adapted under license by your healthcare professional. If you have questions about a medical condition or this instruction, always ask your healthcare professional. [employer] disclaims any warranty or liability for your use of this information.    Learning About Being Physically Active  What is physical activity?     Being physically active means doing any kind of activity that gets your body moving.  The types of physical activity that can help you get fit and stay healthy include:  Aerobic or \"cardio\" activities. These make your heart beat faster and make you breathe harder, such as brisk walking, riding a bike, or running. They strengthen your heart and lungs and build up your endurance.  Strength training activities. These make your muscles work against, or \"resist,\" something. Examples include lifting weights or doing push-ups. These activities help tone and strengthen your muscles and bones.  Stretches. These let you move your joints and muscles through their full range of motion. Stretching helps you be more flexible.  Reaching a balance between these three types of physical activity is important because each one contributes to your overall fitness.  What are the benefits of being active?  Being active is one of the best things you can do for your health. It helps you to:  Feel stronger and have more energy to do all the things you like to do.  Focus better at school or work.  Feel, think, and sleep better.  Reach and stay at a healthy weight.  Lose fat and build lean muscle.  Lower your risk for serious health problems, including diabetes, heart attack, high blood pressure, and some cancers.  Keep your heart, lungs, bones, muscles, and joints strong and healthy.  How can you make being active part of your life?  Start slowly. Make it your " "long-term goal to get at least 30 minutes of exercise on most days of the week. Walking is a good choice. You also may want to do other activities, such as running, swimming, cycling, or playing tennis or team sports.  Pick activities that you like--ones that make your heart beat faster, your muscles stronger, and your muscles and joints more flexible. If you find more than one thing you like doing, do them all. You don't have to do the same thing every day.  Get your heart pumping every day. Any activity that makes your heart beat faster and keeps it at that rate for a while counts.  Here are some great ways to get your heart beating faster:  Go for a brisk walk, run, or hike.  Go for a swim or bike ride.  Take an online exercise class or dance.  Play a game of touch football, basketball, or soccer.  Play tennis, pickleball, or racquetball.  Climb stairs.  Even some household chores can be aerobic. Just do them at a faster pace. Raking or mowing the lawn, sweeping the garage, and vacuuming and cleaning your home all can help get your heart rate up.  Strengthen your muscles during the week. You don't have to lift heavy weights or grow big, bulky muscles to get stronger. Doing a few simple activities that make your muscles work against, or \"resist,\" something can help you get stronger. Aim for at least twice a week.  For example, you can:  Do push-ups or sit-ups, which use your own body weight as resistance.  Lift weights or dumbbells or use stretch bands at home or in a gym or community center.  Stretch your muscles often. Stretching will help you as you become more active. It can help you stay flexible and loosen tight muscles. It can also help improve your balance and posture and can be a great way to relax.  Be sure to stretch the muscles you'll be using when you work out. It's best to warm your muscles slightly before you stretch them. Walk or do some other light aerobic activity for a few minutes. Then start " "stretching.  When you stretch your muscles:  Do it slowly. Stretching is not about going fast or making sudden movements.  Don't push or bounce during a stretch.  Hold each stretch for at least 15 to 30 seconds, if you can. You should feel a stretch in the muscle, but not pain.  Breathe out as you do the stretch. Then breathe in as you hold the stretch. Don't hold your breath.  If you're worried about how more activity might affect your health, have a checkup before you start. Follow any special advice your doctor gives you for getting a smart start.  Where can you learn more?  Go to https://www.[employer].net/patiented  Enter W332 in the search box to learn more about \"Learning About Being Physically Active.\"  Current as of: July 31, 2024  Content Version: 14.5    2030-0081 [employer].   Care instructions adapted under license by your healthcare professional. If you have questions about a medical condition or this instruction, always ask your healthcare professional. [employer] disclaims any warranty or liability for your use of this information.    "

## 2025-08-25 ENCOUNTER — MYC MEDICAL ADVICE (OUTPATIENT)
Dept: FAMILY MEDICINE | Facility: CLINIC | Age: 50
End: 2025-08-25
Payer: COMMERCIAL

## (undated) DEVICE — DECANTER VIAL 2006S

## (undated) DEVICE — SYR 10ML LL W/O NDL 302995

## (undated) DEVICE — GOWN XXL 9575

## (undated) DEVICE — GLOVE PROTEXIS W/NEU-THERA 7.5  2D73TE75

## (undated) DEVICE — LINEN ORTHO ACL PACK 5447

## (undated) DEVICE — RX SURGIFLO HEMOSTATIC MATRIX 8ML 2991

## (undated) DEVICE — DRAPE MICROSCOPE OPMI ZEISS 48X118" 306071-0000-000

## (undated) DEVICE — SUCTION FRAZIER 12FR W/OBTURATOR 33120

## (undated) DEVICE — DECANTER BAG 2002S

## (undated) DEVICE — CUSHION INSERT LG PRONE VIEW JACKSON TABLE

## (undated) DEVICE — SPONGE SURGIFOAM 100 1974

## (undated) DEVICE — SU VICRYL 2-0 CT-2 CR 8X18" J726D

## (undated) DEVICE — SPONGE COTTONOID 1/2X1/2" 80-1400

## (undated) DEVICE — SU VICRYL 0 CT-2 CR 8X18" J727D

## (undated) DEVICE — PEN MARKING SKIN W/LABELS 31145884

## (undated) DEVICE — LINEN POUCH DBL 5427

## (undated) DEVICE — ESU CLEANER TIP 31142717

## (undated) DEVICE — GLOVE PROTEXIS BLUE W/NEU-THERA 8.5  2D73EB85

## (undated) DEVICE — Device

## (undated) DEVICE — PACK SMALL SPINE RIDGES

## (undated) DEVICE — DRSG KERLIX FLUFFS X5

## (undated) DEVICE — MIDAS REX DISSECTING TOOL  14MH30

## (undated) DEVICE — ESU GROUND PAD ADULT W/CORD E7507

## (undated) DEVICE — GLOVE PROTEXIS W/NEU-THERA 8.5  2D73TE85

## (undated) DEVICE — TUBING SUCTION MEDI-VAC SOFT 3/16"X20' N520A

## (undated) RX ORDER — GLYCOPYRROLATE 0.2 MG/ML
INJECTION INTRAMUSCULAR; INTRAVENOUS
Status: DISPENSED
Start: 2018-04-02

## (undated) RX ORDER — CEFAZOLIN SODIUM 2 G/100ML
INJECTION, SOLUTION INTRAVENOUS
Status: DISPENSED
Start: 2018-04-02

## (undated) RX ORDER — BUPIVACAINE HYDROCHLORIDE AND EPINEPHRINE 5; 5 MG/ML; UG/ML
INJECTION, SOLUTION EPIDURAL; INTRACAUDAL; PERINEURAL
Status: DISPENSED
Start: 2018-04-02

## (undated) RX ORDER — DEXAMETHASONE SODIUM PHOSPHATE 4 MG/ML
INJECTION, SOLUTION INTRA-ARTICULAR; INTRALESIONAL; INTRAMUSCULAR; INTRAVENOUS; SOFT TISSUE
Status: DISPENSED
Start: 2018-04-02

## (undated) RX ORDER — PROPOFOL 10 MG/ML
INJECTION, EMULSION INTRAVENOUS
Status: DISPENSED
Start: 2018-04-02

## (undated) RX ORDER — HYDROMORPHONE HYDROCHLORIDE 2 MG/1
TABLET ORAL
Status: DISPENSED
Start: 2018-04-02

## (undated) RX ORDER — ONDANSETRON 2 MG/ML
INJECTION INTRAMUSCULAR; INTRAVENOUS
Status: DISPENSED
Start: 2018-04-02

## (undated) RX ORDER — NEOSTIGMINE METHYLSULFATE 1 MG/ML
VIAL (ML) INJECTION
Status: DISPENSED
Start: 2018-04-02

## (undated) RX ORDER — PHENYLEPHRINE HCL IN 0.9% NACL 1 MG/10 ML
SYRINGE (ML) INTRAVENOUS
Status: DISPENSED
Start: 2018-04-02

## (undated) RX ORDER — FENTANYL CITRATE 50 UG/ML
INJECTION, SOLUTION INTRAMUSCULAR; INTRAVENOUS
Status: DISPENSED
Start: 2018-04-02

## (undated) RX ORDER — LIDOCAINE HYDROCHLORIDE 10 MG/ML
INJECTION, SOLUTION EPIDURAL; INFILTRATION; INTRACAUDAL; PERINEURAL
Status: DISPENSED
Start: 2018-04-02